# Patient Record
Sex: FEMALE | Race: WHITE | Employment: OTHER | URBAN - METROPOLITAN AREA
[De-identification: names, ages, dates, MRNs, and addresses within clinical notes are randomized per-mention and may not be internally consistent; named-entity substitution may affect disease eponyms.]

---

## 2017-04-27 ENCOUNTER — HOSPITAL ENCOUNTER (OUTPATIENT)
Dept: WOMENS IMAGING | Age: 72
Discharge: HOME | End: 2017-04-27
Payer: MEDICARE

## 2017-04-27 DIAGNOSIS — Z13.9 SCREENING: ICD-10-CM

## 2017-04-27 PROCEDURE — 77063 BREAST TOMOSYNTHESIS BI: CPT

## 2017-05-01 ENCOUNTER — HOSPITAL ENCOUNTER (OUTPATIENT)
Dept: CT IMAGING | Age: 72
Discharge: HOME | End: 2017-05-01
Payer: MEDICARE

## 2017-05-01 DIAGNOSIS — R63.4 WEIGHT LOSS: ICD-10-CM

## 2017-05-01 DIAGNOSIS — R10.9 ABDOMINAL PAIN, UNSPECIFIED SITE: ICD-10-CM

## 2017-05-04 ENCOUNTER — HOSPITAL ENCOUNTER (OUTPATIENT)
Dept: CT IMAGING | Age: 72
Discharge: HOME | End: 2017-05-04
Payer: MEDICARE

## 2017-05-04 VITALS — RESPIRATION RATE: 12 BRPM | DIASTOLIC BLOOD PRESSURE: 66 MMHG | SYSTOLIC BLOOD PRESSURE: 110 MMHG | HEART RATE: 66 BPM

## 2017-05-04 LAB
GFR AFRICAN AMERICAN: >60
GFR NON-AFRICAN AMERICAN: >60
PERFORMED ON: NORMAL
POC CREATININE: 0.7 MG/DL (ref 0.6–1.2)
POC SAMPLE TYPE: NORMAL

## 2017-05-04 PROCEDURE — 2500000003 HC RX 250 WO HCPCS: Performed by: RADIOLOGY

## 2017-05-04 PROCEDURE — 71260 CT THORAX DX C+: CPT

## 2017-05-04 PROCEDURE — 74177 CT ABD & PELVIS W/CONTRAST: CPT

## 2017-05-04 PROCEDURE — 6360000004 HC RX CONTRAST MEDICATION: Performed by: RADIOLOGY

## 2017-05-04 RX ADMIN — BARIUM SULFATE 450 ML: 21 SUSPENSION ORAL at 08:21

## 2017-05-04 RX ADMIN — IOPAMIDOL 100 ML: 755 INJECTION, SOLUTION INTRAVENOUS at 08:20

## 2021-08-19 LAB
ALBUMIN SERPL-MCNC: 4.2 G/DL (ref 3.5–4.6)
ALP BLD-CCNC: 59 U/L (ref 40–130)
ALT SERPL-CCNC: 22 U/L (ref 0–33)
ANION GAP SERPL CALCULATED.3IONS-SCNC: 9 MEQ/L (ref 9–15)
AST SERPL-CCNC: 24 U/L (ref 0–35)
BILIRUB SERPL-MCNC: 0.9 MG/DL (ref 0.2–0.7)
BUN BLDV-MCNC: 14 MG/DL (ref 8–23)
CALCIUM SERPL-MCNC: 9.4 MG/DL (ref 8.5–9.9)
CHLORIDE BLD-SCNC: 102 MEQ/L (ref 95–107)
CHOLESTEROL, TOTAL: 134 MG/DL (ref 0–199)
CO2: 28 MEQ/L (ref 20–31)
CREAT SERPL-MCNC: 0.6 MG/DL (ref 0.5–0.9)
GFR AFRICAN AMERICAN: >60
GFR NON-AFRICAN AMERICAN: >60
GLOBULIN: 2.8 G/DL (ref 2.3–3.5)
GLUCOSE BLD-MCNC: 90 MG/DL (ref 70–99)
HDLC SERPL-MCNC: 79 MG/DL (ref 40–59)
LDL CHOLESTEROL CALCULATED: 45 MG/DL (ref 0–129)
POTASSIUM SERPL-SCNC: 4.4 MEQ/L (ref 3.4–4.9)
SODIUM BLD-SCNC: 139 MEQ/L (ref 135–144)
TOTAL PROTEIN: 7 G/DL (ref 6.3–8)
TRIGL SERPL-MCNC: 51 MG/DL (ref 0–150)

## 2022-04-23 ENCOUNTER — APPOINTMENT (OUTPATIENT)
Dept: MRI IMAGING | Age: 77
DRG: 065 | End: 2022-04-23
Payer: MEDICARE

## 2022-04-23 ENCOUNTER — HOSPITAL ENCOUNTER (INPATIENT)
Age: 77
LOS: 2 days | Discharge: HOME | DRG: 065 | End: 2022-04-25
Attending: STUDENT IN AN ORGANIZED HEALTH CARE EDUCATION/TRAINING PROGRAM | Admitting: FAMILY MEDICINE
Payer: MEDICARE

## 2022-04-23 ENCOUNTER — APPOINTMENT (OUTPATIENT)
Dept: CT IMAGING | Age: 77
DRG: 065 | End: 2022-04-23
Payer: MEDICARE

## 2022-04-23 ENCOUNTER — APPOINTMENT (OUTPATIENT)
Dept: GENERAL RADIOLOGY | Age: 77
DRG: 065 | End: 2022-04-23
Payer: MEDICARE

## 2022-04-23 DIAGNOSIS — I61.5 INTRAVENTRICULAR HEMORRHAGE (HCC): Primary | ICD-10-CM

## 2022-04-23 PROBLEM — E78.49 OTHER HYPERLIPIDEMIA: Status: ACTIVE | Noted: 2022-04-23

## 2022-04-23 PROBLEM — K20.90 BARRETT'S ESOPHAGUS WITH ESOPHAGITIS: Status: ACTIVE | Noted: 2022-04-23

## 2022-04-23 PROBLEM — K22.70 BARRETT'S ESOPHAGUS WITH ESOPHAGITIS: Status: ACTIVE | Noted: 2022-04-23

## 2022-04-23 LAB
ABO/RH: NORMAL
ALBUMIN SERPL-MCNC: 4.1 G/DL (ref 3.5–4.6)
ALP BLD-CCNC: 59 U/L (ref 40–130)
ALT SERPL-CCNC: 16 U/L (ref 0–33)
ANION GAP SERPL CALCULATED.3IONS-SCNC: 13 MEQ/L (ref 9–15)
ANTIBODY SCREEN: NORMAL
APTT: 29 SEC (ref 24.4–36.8)
AST SERPL-CCNC: 18 U/L (ref 0–35)
BASOPHILS ABSOLUTE: 0 K/UL (ref 0–0.2)
BASOPHILS RELATIVE PERCENT: 0.5 %
BILIRUB SERPL-MCNC: 0.7 MG/DL (ref 0.2–0.7)
BILIRUBIN URINE: NEGATIVE
BLOOD, URINE: NEGATIVE
BUN BLDV-MCNC: 12 MG/DL (ref 8–23)
CALCIUM SERPL-MCNC: 8.6 MG/DL (ref 8.5–9.9)
CHLORIDE BLD-SCNC: 96 MEQ/L (ref 95–107)
CLARITY: CLEAR
CO2: 23 MEQ/L (ref 20–31)
COLOR: YELLOW
CREAT SERPL-MCNC: 0.49 MG/DL (ref 0.5–0.9)
EOSINOPHILS ABSOLUTE: 0 K/UL (ref 0–0.7)
EOSINOPHILS RELATIVE PERCENT: 0.5 %
GFR AFRICAN AMERICAN: >60
GFR NON-AFRICAN AMERICAN: >60
GLOBULIN: 2.7 G/DL (ref 2.3–3.5)
GLUCOSE BLD-MCNC: 102 MG/DL (ref 70–99)
GLUCOSE URINE: NEGATIVE MG/DL
HCT VFR BLD CALC: 38.1 % (ref 37–47)
HEMOGLOBIN: 13.1 G/DL (ref 12–16)
INFLUENZA A BY PCR: NEGATIVE
INFLUENZA B BY PCR: NEGATIVE
INR BLD: 1
KETONES, URINE: 40 MG/DL
LEUKOCYTE ESTERASE, URINE: NEGATIVE
LYMPHOCYTES ABSOLUTE: 1.8 K/UL (ref 1–4.8)
LYMPHOCYTES RELATIVE PERCENT: 26.3 %
MAGNESIUM: 2.1 MG/DL (ref 1.7–2.4)
MCH RBC QN AUTO: 30.1 PG (ref 27–31.3)
MCHC RBC AUTO-ENTMCNC: 34.3 % (ref 33–37)
MCV RBC AUTO: 87.7 FL (ref 82–100)
MONOCYTES ABSOLUTE: 0.5 K/UL (ref 0.2–0.8)
MONOCYTES RELATIVE PERCENT: 6.7 %
NEUTROPHILS ABSOLUTE: 4.6 K/UL (ref 1.4–6.5)
NEUTROPHILS RELATIVE PERCENT: 66 %
NITRITE, URINE: NEGATIVE
PDW BLD-RTO: 12.7 % (ref 11.5–14.5)
PH UA: 5.5 (ref 5–9)
PLATELET # BLD: 198 K/UL (ref 130–400)
POTASSIUM SERPL-SCNC: 3.9 MEQ/L (ref 3.4–4.9)
PROTEIN UA: NEGATIVE MG/DL
PROTHROMBIN TIME: 13.5 SEC (ref 12.3–14.9)
RBC # BLD: 4.34 M/UL (ref 4.2–5.4)
REASON FOR REJECTION: NORMAL
REJECTED TEST: NORMAL
SARS-COV-2, NAAT: NOT DETECTED
SODIUM BLD-SCNC: 132 MEQ/L (ref 135–144)
SPECIFIC GRAVITY UA: 1.02 (ref 1–1.03)
TOTAL CK: 77 U/L (ref 0–170)
TOTAL PROTEIN: 6.8 G/DL (ref 6.3–8)
URINE REFLEX TO CULTURE: ABNORMAL
UROBILINOGEN, URINE: 1 E.U./DL
WBC # BLD: 7 K/UL (ref 4.8–10.8)

## 2022-04-23 PROCEDURE — 6360000004 HC RX CONTRAST MEDICATION: Performed by: STUDENT IN AN ORGANIZED HEALTH CARE EDUCATION/TRAINING PROGRAM

## 2022-04-23 PROCEDURE — 87040 BLOOD CULTURE FOR BACTERIA: CPT

## 2022-04-23 PROCEDURE — 70450 CT HEAD/BRAIN W/O DYE: CPT

## 2022-04-23 PROCEDURE — 96365 THER/PROPH/DIAG IV INF INIT: CPT

## 2022-04-23 PROCEDURE — 92610 EVALUATE SWALLOWING FUNCTION: CPT

## 2022-04-23 PROCEDURE — 83735 ASSAY OF MAGNESIUM: CPT

## 2022-04-23 PROCEDURE — 1210000000 HC MED SURG R&B

## 2022-04-23 PROCEDURE — 86850 RBC ANTIBODY SCREEN: CPT

## 2022-04-23 PROCEDURE — 36415 COLL VENOUS BLD VENIPUNCTURE: CPT

## 2022-04-23 PROCEDURE — 6370000000 HC RX 637 (ALT 250 FOR IP): Performed by: STUDENT IN AN ORGANIZED HEALTH CARE EDUCATION/TRAINING PROGRAM

## 2022-04-23 PROCEDURE — 85730 THROMBOPLASTIN TIME PARTIAL: CPT

## 2022-04-23 PROCEDURE — 96375 TX/PRO/DX INJ NEW DRUG ADDON: CPT

## 2022-04-23 PROCEDURE — 2580000003 HC RX 258: Performed by: STUDENT IN AN ORGANIZED HEALTH CARE EDUCATION/TRAINING PROGRAM

## 2022-04-23 PROCEDURE — 99291 CRITICAL CARE FIRST HOUR: CPT | Performed by: PSYCHIATRY & NEUROLOGY

## 2022-04-23 PROCEDURE — 70551 MRI BRAIN STEM W/O DYE: CPT

## 2022-04-23 PROCEDURE — 96366 THER/PROPH/DIAG IV INF ADDON: CPT

## 2022-04-23 PROCEDURE — 81003 URINALYSIS AUTO W/O SCOPE: CPT

## 2022-04-23 PROCEDURE — 87635 SARS-COV-2 COVID-19 AMP PRB: CPT

## 2022-04-23 PROCEDURE — 86900 BLOOD TYPING SEROLOGIC ABO: CPT

## 2022-04-23 PROCEDURE — 99231 SBSQ HOSP IP/OBS SF/LOW 25: CPT | Performed by: NEUROLOGICAL SURGERY

## 2022-04-23 PROCEDURE — 71045 X-RAY EXAM CHEST 1 VIEW: CPT

## 2022-04-23 PROCEDURE — 87502 INFLUENZA DNA AMP PROBE: CPT

## 2022-04-23 PROCEDURE — 82550 ASSAY OF CK (CPK): CPT

## 2022-04-23 PROCEDURE — 6360000002 HC RX W HCPCS: Performed by: STUDENT IN AN ORGANIZED HEALTH CARE EDUCATION/TRAINING PROGRAM

## 2022-04-23 PROCEDURE — 93005 ELECTROCARDIOGRAM TRACING: CPT | Performed by: STUDENT IN AN ORGANIZED HEALTH CARE EDUCATION/TRAINING PROGRAM

## 2022-04-23 PROCEDURE — 70496 CT ANGIOGRAPHY HEAD: CPT

## 2022-04-23 PROCEDURE — 85025 COMPLETE CBC W/AUTO DIFF WBC: CPT

## 2022-04-23 PROCEDURE — 85610 PROTHROMBIN TIME: CPT

## 2022-04-23 PROCEDURE — 70498 CT ANGIOGRAPHY NECK: CPT

## 2022-04-23 PROCEDURE — 86901 BLOOD TYPING SEROLOGIC RH(D): CPT

## 2022-04-23 PROCEDURE — 80053 COMPREHEN METABOLIC PANEL: CPT

## 2022-04-23 PROCEDURE — 6370000000 HC RX 637 (ALT 250 FOR IP): Performed by: FAMILY MEDICINE

## 2022-04-23 PROCEDURE — 99285 EMERGENCY DEPT VISIT HI MDM: CPT

## 2022-04-23 RX ORDER — MAGNESIUM SULFATE IN WATER 40 MG/ML
2000 INJECTION, SOLUTION INTRAVENOUS ONCE
Status: COMPLETED | OUTPATIENT
Start: 2022-04-23 | End: 2022-04-23

## 2022-04-23 RX ORDER — LEVOTHYROXINE SODIUM 0.03 MG/1
25 TABLET ORAL DAILY
COMMUNITY

## 2022-04-23 RX ORDER — ATORVASTATIN CALCIUM 40 MG/1
40 TABLET, FILM COATED ORAL NIGHTLY
COMMUNITY
End: 2022-05-11

## 2022-04-23 RX ORDER — LORAZEPAM 2 MG/ML
1 INJECTION INTRAMUSCULAR
Status: ACTIVE | OUTPATIENT
Start: 2022-04-23 | End: 2022-04-23

## 2022-04-23 RX ORDER — 0.9 % SODIUM CHLORIDE 0.9 %
500 INTRAVENOUS SOLUTION INTRAVENOUS ONCE
Status: COMPLETED | OUTPATIENT
Start: 2022-04-23 | End: 2022-04-23

## 2022-04-23 RX ORDER — METOPROLOL SUCCINATE 25 MG/1
25 TABLET, EXTENDED RELEASE ORAL DAILY
COMMUNITY

## 2022-04-23 RX ORDER — DIPHENHYDRAMINE HYDROCHLORIDE 50 MG/ML
25 INJECTION INTRAMUSCULAR; INTRAVENOUS ONCE
Status: COMPLETED | OUTPATIENT
Start: 2022-04-23 | End: 2022-04-23

## 2022-04-23 RX ORDER — ONDANSETRON 4 MG/1
4 TABLET, ORALLY DISINTEGRATING ORAL EVERY 8 HOURS PRN
Status: DISCONTINUED | OUTPATIENT
Start: 2022-04-23 | End: 2022-04-25 | Stop reason: HOSPADM

## 2022-04-23 RX ORDER — ONDANSETRON 2 MG/ML
4 INJECTION INTRAMUSCULAR; INTRAVENOUS EVERY 6 HOURS PRN
Status: DISCONTINUED | OUTPATIENT
Start: 2022-04-23 | End: 2022-04-25 | Stop reason: HOSPADM

## 2022-04-23 RX ORDER — PROCHLORPERAZINE EDISYLATE 5 MG/ML
10 INJECTION INTRAMUSCULAR; INTRAVENOUS ONCE
Status: COMPLETED | OUTPATIENT
Start: 2022-04-23 | End: 2022-04-23

## 2022-04-23 RX ORDER — 0.9 % SODIUM CHLORIDE 0.9 %
1000 INTRAVENOUS SOLUTION INTRAVENOUS ONCE
Status: COMPLETED | OUTPATIENT
Start: 2022-04-23 | End: 2022-04-23

## 2022-04-23 RX ORDER — LABETALOL HYDROCHLORIDE 5 MG/ML
10 INJECTION, SOLUTION INTRAVENOUS EVERY 10 MIN PRN
Status: DISCONTINUED | OUTPATIENT
Start: 2022-04-23 | End: 2022-04-25 | Stop reason: HOSPADM

## 2022-04-23 RX ORDER — ACETAMINOPHEN 500 MG
1000 TABLET ORAL ONCE
Status: COMPLETED | OUTPATIENT
Start: 2022-04-23 | End: 2022-04-23

## 2022-04-23 RX ORDER — ACETAMINOPHEN 325 MG/1
650 TABLET ORAL EVERY 4 HOURS PRN
Status: DISCONTINUED | OUTPATIENT
Start: 2022-04-23 | End: 2022-04-25 | Stop reason: HOSPADM

## 2022-04-23 RX ORDER — LORAZEPAM 2 MG/ML
1 INJECTION INTRAMUSCULAR EVERY 4 HOURS PRN
Status: DISCONTINUED | OUTPATIENT
Start: 2022-04-23 | End: 2022-04-25 | Stop reason: HOSPADM

## 2022-04-23 RX ORDER — KETOROLAC TROMETHAMINE 15 MG/ML
15 INJECTION, SOLUTION INTRAMUSCULAR; INTRAVENOUS ONCE
Status: COMPLETED | OUTPATIENT
Start: 2022-04-23 | End: 2022-04-23

## 2022-04-23 RX ADMIN — MAGNESIUM SULFATE HEPTAHYDRATE 2000 MG: 40 INJECTION, SOLUTION INTRAVENOUS at 02:51

## 2022-04-23 RX ADMIN — IOPAMIDOL 100 ML: 612 INJECTION, SOLUTION INTRAVENOUS at 06:38

## 2022-04-23 RX ADMIN — ACETAMINOPHEN 650 MG: 325 TABLET ORAL at 18:25

## 2022-04-23 RX ADMIN — KETOROLAC TROMETHAMINE 15 MG: 15 INJECTION, SOLUTION INTRAMUSCULAR; INTRAVENOUS at 02:50

## 2022-04-23 RX ADMIN — SODIUM CHLORIDE 500 ML: 9 INJECTION, SOLUTION INTRAVENOUS at 02:53

## 2022-04-23 RX ADMIN — DIPHENHYDRAMINE HYDROCHLORIDE 25 MG: 50 INJECTION, SOLUTION INTRAMUSCULAR; INTRAVENOUS at 02:47

## 2022-04-23 RX ADMIN — PROCHLORPERAZINE EDISYLATE 10 MG: 5 INJECTION INTRAMUSCULAR; INTRAVENOUS at 02:54

## 2022-04-23 RX ADMIN — SODIUM CHLORIDE 1000 ML: 9 INJECTION, SOLUTION INTRAVENOUS at 02:52

## 2022-04-23 RX ADMIN — ACETAMINOPHEN 1000 MG: 500 TABLET ORAL at 02:54

## 2022-04-23 ASSESSMENT — ENCOUNTER SYMPTOMS
SHORTNESS OF BREATH: 0
CHOKING: 0
BACK PAIN: 0
NAUSEA: 0
TROUBLE SWALLOWING: 0
PHOTOPHOBIA: 0
COLOR CHANGE: 0
VOMITING: 0

## 2022-04-23 ASSESSMENT — PAIN SCALES - GENERAL
PAINLEVEL_OUTOF10: 0
PAINLEVEL_OUTOF10: 10
PAINLEVEL_OUTOF10: 0

## 2022-04-23 ASSESSMENT — PAIN DESCRIPTION - FREQUENCY: FREQUENCY: CONTINUOUS

## 2022-04-23 ASSESSMENT — PAIN DESCRIPTION - LOCATION: LOCATION: GENERALIZED

## 2022-04-23 ASSESSMENT — PAIN - FUNCTIONAL ASSESSMENT
PAIN_FUNCTIONAL_ASSESSMENT: NONE - DENIES PAIN
PAIN_FUNCTIONAL_ASSESSMENT: 0-10
PAIN_FUNCTIONAL_ASSESSMENT: ACTIVITIES ARE NOT PREVENTED
PAIN_FUNCTIONAL_ASSESSMENT: NONE - DENIES PAIN

## 2022-04-23 ASSESSMENT — PAIN DESCRIPTION - PAIN TYPE: TYPE: ACUTE PAIN

## 2022-04-23 ASSESSMENT — PAIN DESCRIPTION - DESCRIPTORS
DESCRIPTORS: THROBBING
DESCRIPTORS: ACHING

## 2022-04-23 NOTE — PROGRESS NOTES
[facility] EMERGENCY [facility] AT [place]   Facility/Department: [address] [facility] ED  Speech Language Pathology  Clinical Bedside Swallow Evaluation    Solange oS  : 1945 (22 y.o.)   MRN: [id number]  ROOM:   ADMISSION DATE: 2022  PATIENT DIAGNOSIS(ES): Intraventricular hemorrhage [facility]) [I61.5]  Chief Complaint   Patient presents with    Illness     Patient Active Problem List    Diagnosis Date Noted    Intraventricular hemorrhage, nontraumatic ([facility] [address] 2022    Intraventricular hemorrhage ([facility] [address] 2022    Other hyperlipidemia 2022    Beasley's esophagus with esophagitis 2022     History reviewed. No pertinent past medical history. History reviewed. No pertinent surgical history. Allergies   Allergen Reactions    Hydromorphone      Other reaction(s): Mental Status Change  Hard time understanding people, and vomiting    Penicillins      Other reaction(s): Other: See Comments  convulsion       DATE ONSET: 2022    Date of Evaluation: 2022   Evaluating Therapist: MAHESH Novak    Dysphagia Diagnosis  Dysphagia Diagnosis: Swallow function appears WFL;No clinical indicators of dysphagia  Dysphagia Impression : Patient presents with oral phase WFL and no overt s/s of aspiration. Patient completed PO trials including sips of water from cup, consecutive sips from cup, and straw sips. No overt s/s of aspiration observed. Patient completed PO trials of puree and regular solid. Patient with timely mastication and bolus formation, no overt s/s of aspiration noted. Recommend regular/thin liquid diet. Recommended Diet     Diet Solids Recommendation: Regular  Liquid Consistency Recommendation:  Thin  Recommended Form of Meds: PO  Compensatory Swallowing Strategies : Upright as possible for all oral intake      Reason for Referral  Krupa Dominguez was referred for a bedside swallow evaluation to assess the efficiency of her swallow function, identify signs and symptoms of aspiration, identify risk factors, and make recommendations regarding safe dietary consistencies, effective compensatory strategies, and safe eating environment. General  Chart Reviewed: Yes  Subjective  Subjective: Patient admitted to ED 4/23/22 with c/o headache, fever. CT revealed very small left ventricular hemorrhage. Behavior/Cognition: Alert; Cooperative;Pleasant mood  Respiratory Status: Room air  O2 Device: None (Room air)  Communication Observation: Functional  Dentition: Adequate  Patient Positioning: Upright in bed  Baseline Vocal Quality: Normal  Prior Dysphagia History: Per patient and son, no history of dysphagia. Patient avoids spicy foods due to history of Beasley's esophagus. Consistencies Administered: Regular;Pureed; Thin - straw; Thin - cup    Vision and Hearing  Vision  Vision: Within Functional Limits  Hearing  Hearing: Within functional limits    Current Diet level  Current Diet : NPO  Current Liquid Diet : NPO    Oral Motor  Labial: No impairment  Dentition: Full  Lingual: No impairment  Velum: No Impairment    Oral/Pharyngeal Phase  Oral Phase - Comment: Oral phase WNL. Pharyngeal Phase: No overt s/s of aspiration observed for consistencies presented. PO Trials  Patient Position: Upright in bed  Vocal Quality: No Impairment  Consistency Presented:  Thin  How Presented: Self-fed/presented;Straw;Cup/gulp;Cup/sip  How much presented:  (3 sips + 3 oz + straw sips)  Bolus Acceptance: No impairment  Bolus Formation/Control: No impairment  Propulsion: No impairment  Oral Residue: None  Initiation of Swallow: No impairment  Aspiration Signs/Symptoms: None  Pharyngeal Phase Characteristics: No impairment, issues, or problems  Additional PO Trials: 2    PO Trials 2  Consistency Presented: Pureed  How Presented: Self-fed/presented  How much presented:  (1 tsp)  Bolus Acceptance: No impairment  Bolus Formation/Control: No impairment  Propulsion: No impairment  Oral Residue: None  Initiation of Swallow: No impairment  Aspiration Signs/Symptoms: None    PO Trials 3  Consistency Presented: Regular  How Presented: Self-fed/presented  How much presented:  (1 bite emile cracker)  Bolus Acceptance: No impairment  Bolus Formation/Control: No impairment  Propulsion: No impairment  Oral Residue: None  Initiation of Swallow: No impairment  Aspiration Signs/Symptoms: None    Dysphagia Diagnosis  Dysphagia Diagnosis: Swallow function appears WFL;No clinical indicators of dysphagia  Dysphagia Impression : Patient presents with oral phase WFL and no overt s/s of aspiration. Patient completed PO trials including sips of water from cup, consecutive sips from cup, and straw sips. No overt s/s of aspiration observed. Patient completed PO trials of puree and regular solid. Patient with timely mastication and bolus formation, no overt s/s of aspiration noted. Recommend regular/thin liquid diet. Dysphagia Outcome Severity Scale: Level 7: Normal in all situations     Recommendations  Requires SLP Intervention: Yes  D/C Recommendations: No follow up therapy recommended post discharge  Diet Solids Recommendation: Regular  Liquid Consistency Recommendation: Thin  Compensatory Swallowing Strategies : Upright as possible for all oral intake  Recommended Form of Meds: PO  Duration of Treatment: 1x/week or until goals are met    Prognosis  Prognosis  Prognosis for safe diet advancement: excellent    Education  Individuals consulted  Consulted and agree with results and recommendations: RN  RN Name: [id number]    Treatment/Goals  Short-term Goals  Timeframe for Short-term Goals: 1 week  Goal 1: Patient will complete meal monitoring with SLP to assess diet tolerance. Long-term Goals  Timeframe for Long-term Goals: 1 week  Goal 1: Patient will tolerate least restrictive diet with no adverse outcomes.     Safety Devices  Safety Devices  Safety Devices in place: Yes  Type of devices: Call light within reach    Pain Assessment  Pain Assessment: Patient does not appear in pain    Pain Re-assessment  Pain Reassessment: Patient does not appear in pain      Therapy Time   Time in: 1100  Time out: 1110  Total time: 10 mins              Signature: Electronically signed by MAHESH Dallas on 4/23/2022 at 11:37 AM

## 2022-04-23 NOTE — ED PROVIDER NOTES
[address] [facility]  eMERGENCY dEPARTMENT eNCOUnter      Pt Name: Erma Coyne  MRN: [id number]  [place] 1945  Date of evaluation: 4/23/2022  Provider: Jose Maria Chavez MD      HISTORY OF PRESENT ILLNESS      Chief Complaint   Patient presents with    Illness       The history is provided by the Patient. Erma Coyne is a 78 y.o. female with a PMH clinically significant for Cataracts presenting to the ED via PV c/o generalized fatigue/myalgias, headache, fevers, chills and diarrhea with initial onset 5 days ago. Patient states headache was gradual onset, aching and throbbing. States headache is diffuse. No radiation into the neck. No associated photophobia, vision changes, numbness, weakness, tingling, nausea or vomiting. Also denies any associated neck stiffness/pain. States intermittent fevers with maximum temperature of 100.8 °F.  Has been taking OTC meds with some relief. Denies any associated sore throat, congestion, cough, abdominal pain, chest pain, shortness of breath. States diarrhea has been 2-3 episodes per day. No associated blood. States headache is also been intermittent. Was more severe today associated with lightheadedness. Patient otherwise stating that she has been feeling well. No known sick contacts. States no history of similar previous episodes. States no hx of regular HA's. Per Chart Review: No recent imaging of the head. REVIEW OF SYSTEMS       Review of Systems   Constitutional: Positive for activity change, appetite change, chills, fatigue and fever. Negative for diaphoresis. HENT: Negative for rhinorrhea and sore throat. Eyes: Negative for photophobia, pain and visual disturbance. Respiratory: Negative for cough and shortness of breath. Cardiovascular: Negative for chest pain and palpitations. Gastrointestinal: Positive for diarrhea. Negative for abdominal pain, nausea and vomiting.    Genitourinary: Negative for difficulty urinating and dysuria. Musculoskeletal: Positive for myalgias. Negative for arthralgias, back pain, neck pain and neck stiffness. Skin: Negative for rash. Neurological: Positive for light-headedness and headaches. Negative for dizziness, speech difficulty, weakness and numbness. PAST MEDICAL HISTORY   History reviewed. No pertinent past medical history. SURGICAL HISTORY     History reviewed. No pertinent surgical history. FAMILY HISTORY     History reviewed. No pertinent family history. SOCIAL HISTORY       Social History     Socioeconomic History    Marital status: Single     Spouse name: None    Number of children: None    Years of education: None    Highest education level: None   Occupational History    None   Tobacco Use    Smoking status: Never Smoker    Smokeless tobacco: None   Substance and Sexual Activity    Alcohol use: None    Drug use: None    Sexual activity: None   Other Topics Concern    None   Social History Narrative    None     Social Determinants of Health     Financial Resource Strain:     Difficulty of Paying Living Expenses: Not on file   Food Insecurity:     Worried About Running Out of Food in the Last Year: Not on file    Jayleen of Food in the Last Year: Not on file   Transportation Needs:     Lack of Transportation (Medical): Not on file    Lack of Transportation (Non-Medical):  Not on file   Physical Activity:     Days of Exercise per Week: Not on file    Minutes of Exercise per Session: Not on file   Stress:     Feeling of Stress : Not on file   Social Connections:     Frequency of Communication with Friends and Family: Not on file    Frequency of Social Gatherings with Friends and Family: Not on file    Attends Hinduism Services: Not on file    Active Member of Clubs or Organizations: Not on file    Attends Club or Organization Meetings: Not on file    Marital Status: Not on file   Intimate Partner Violence:     Fear of Current or Ex-Partner: Not on file    Emotionally Abused: Not on file    Physically Abused: Not on file    Sexually Abused: Not on file   Housing Stability:     Unable to Pay for Housing in the Last Year: Not on file    Number of Sixto in the Last Year: Not on file    Unstable Housing in the Last Year: Not on file       CURRENT MEDICATIONS       Discharge Medication List as of 4/25/2022  1:31 PM      CONTINUE these medications which have NOT CHANGED    Details   atorvastatin (LIPITOR) 40 MG tablet Take 40 mg by mouth nightlyHistorical Med      levothyroxine (SYNTHROID) 25 MCG tablet Take 25 mcg by mouth DailyHistorical Med      metoprolol succinate (TOPROL XL) 25 MG extended release tablet Take 25 mg by mouth dailyHistorical Med             ALLERGIES     Hydromorphone and Penicillins      PHYSICAL EXAM       ED Triage Vitals [04/23/22 0116]   BP Temp Temp Source Pulse Resp SpO2 Height Weight   (!) 160/78 98.7 °F (37.1 °C) Oral 79 18 98 % 5' 2\" (1.575 m) 120 lb (54.4 kg)       Physical Exam  Vitals and nursing note reviewed. Constitutional:       General: She is not in acute distress. Appearance: She is not ill-appearing, toxic-appearing or diaphoretic. Comments: Fatigued-appearing   HENT:      Head: Normocephalic and atraumatic. Mouth/Throat:      Mouth: Mucous membranes are dry. Pharynx: Oropharynx is clear. Eyes:      Extraocular Movements: Extraocular movements intact. Conjunctiva/sclera: Conjunctivae normal.      Pupils: Pupils are equal, round, and reactive to light. Neck:      Meningeal: Brudzinski's sign and Kernig's sign absent. Cardiovascular:      Rate and Rhythm: Normal rate and regular rhythm. Pulses: Normal pulses. Heart sounds: Normal heart sounds. Pulmonary:      Effort: Pulmonary effort is normal. No respiratory distress. Breath sounds: Normal breath sounds. Chest:      Chest wall: No tenderness. Abdominal:      General: There is no distension.       Palpations: Abdomen is soft. Tenderness: There is no abdominal tenderness. There is no guarding or rebound. Musculoskeletal:         General: No tenderness. Cervical back: Normal range of motion and neck supple. No rigidity or tenderness. Right lower leg: No edema. Left lower leg: No edema. Lymphadenopathy:      Cervical: No cervical adenopathy. Skin:     General: Skin is dry. Capillary Refill: Capillary refill takes less than 2 seconds. Neurological:      General: No focal deficit present. Mental Status: She is alert and oriented to person, place, and time. Cranial Nerves: No cranial nerve deficit. Sensory: No sensory deficit. Motor: No weakness.       Coordination: Coordination normal.   Psychiatric:         Mood and Affect: Mood normal.         Behavior: Behavior normal.         MDM:   Chart Reviewed: PMH and additional information as noted in HPI obtained from chart review    Vitals:    Vitals:    04/24/22 1243 04/24/22 2000 04/25/22 0656 04/25/22 0906   BP: (!) 129/50 (!) 153/55 (!) 154/70 (!) 126/57   Pulse: 76 73 80 81   Resp:  18  18   Temp:  98.4 °F (36.9 °C) 97.9 °F (36.6 °C) 97.9 °F (36.6 °C)   TempSrc:  Oral  Oral   SpO2: 100% 99% 98% 100%   Weight:       Height:           PROCEDURES:  Unless otherwise noted below, none  Procedures    LABS:  Labs Reviewed   COMPREHENSIVE METABOLIC PANEL - Abnormal; Notable for the following components:       Result Value    Sodium 132 (*)     Glucose 102 (*)     CREATININE 0.49 (*)     All other components within normal limits   URINALYSIS WITH REFLEX TO CULTURE - Abnormal; Notable for the following components:    Ketones, Urine 40 (*)     All other components within normal limits   BASIC METABOLIC PANEL - Abnormal; Notable for the following components:    Sodium 134 (*)     CREATININE 0.46 (*)     Calcium 8.0 (*)     All other components within normal limits   CBC - Abnormal; Notable for the following components:    RBC 3.88 (*) Hemoglobin 11.6 (*)     Hematocrit 34.3 (*)     All other components within normal limits   RAPID INFLUENZA A/B ANTIGENS   COVID-19, RAPID   CULTURE, BLOOD 1    Narrative:     ORDER#: [id number]                          ORDERED BY: Eloisa Torres  SOURCE: Blood                              COLLECTED:  04/23/22 05:08  [facility] AT [facility].:                      RECEIVED :  04/23/22 05:08   CULTURE, BLOOD 2    Narrative:     ORDER#: [id number]                          ORDERED BY: Eloisa Torres  SOURCE: Blood                              COLLECTED:  04/23/22 05:08  [facility].:                      RECEIVED :  04/23/22 05:08   MAGNESIUM   CBC WITH AUTO DIFFERENTIAL   CK   PROTIME-INR   APTT   SPECIMEN REJECTION    Narrative:     ORDER WAS CANCELLED 04/23/2022 05:42, no intials, date and time. HEMOGLOBIN A1C   TYPE AND SCREEN       MRI BRAIN WO CONTRAST   Final Result      Small amount of hemorrhage within the occipital horns of the bilateral lateral ventricles. No change in morphology of the ventricles. Generalized parenchymal volume loss and nonspecific white matter findings most compatible with chronic small vessel ischemic changes in a patient of this age. CT head without contrast   Final Result      Interval development of a small amount of hemorrhage within the occipital horn the right lateral ventricle. No significant interval change in the amount of hemorrhage within the occipital horn of the left lateral ventricle. Unchanged morphology of the ventricles. All CT scans at this facility use dose modulation, iterative reconstruction, and/or weight based dosing when appropriate to reduce radiation dose to as low as reasonably achievable. CTA NECK W WO CONTRAST   Final Result      CTA head:      No aneurysm or high-grade stenosis. CTA neck:      No aneurysm, dissection, or high-grade stenosis. CTV head:      No dural venous sinus thrombosis.          All CT scans at this facility use dose modulation, iterative reconstruction, and/or weight based dosing when appropriate to reduce radiation dose to as low as reasonably achievable. CTA HEAD W WO CONTRAST   Final Result      CTA head:      No aneurysm or high-grade stenosis. CTA neck:      No aneurysm, dissection, or high-grade stenosis. CTV head:      No dural venous sinus thrombosis. All CT scans at this facility use dose modulation, iterative reconstruction, and/or weight based dosing when appropriate to reduce radiation dose to as low as reasonably achievable. CTA VENOUS HEAD W WO CONTRAST   Final Result      CTA head:      No aneurysm or high-grade stenosis. CTA neck:      No aneurysm, dissection, or high-grade stenosis. CTV head:      No dural venous sinus thrombosis. All CT scans at this facility use dose modulation, iterative reconstruction, and/or weight based dosing when appropriate to reduce radiation dose to as low as reasonably achievable. CT HEAD WO CONTRAST   Final Result      Small amount of intraventricular hemorrhage within the occipital horn of the left lateral ventricle. Mild prominence of the ventricles is nonspecific and may be due to generalized parenchymal volume loss however developing hydrocephalus cannot be    excluded. All CT scans at this facility use dose modulation, iterative reconstruction, and/or weight based dosing when appropriate to reduce radiation dose to as low as reasonably achievable. XR CHEST PORTABLE   Final Result      No radiographic evidence of acute intrathoracic process.             CT HEAD WO CONTRAST    (Results Pending)       ED Course as of 04/28/22 1625   Sat Apr 23, 2022 0312 SARS-CoV-2, NAAT: Not Detected [NA]   0312 CBC with Auto Differential:    WBC 7.0   RBC 4.34   Hemoglobin Quant 13.1   Hematocrit 38.1   MCV 87.7   MCH 30.1   MCHC 34.3   RDW 12.7   Platelet Count 805   Neutrophils % 66.0 Lymphocyte % 26.3   Monocytes % 6.7   Eosinophils % 0.5   Basophils % 0.5   Neutrophils Absolute 4.6   Lymphocytes Absolute 1.8   Monocytes Absolute 0.5   Eosinophils Absolute 0.0   Basophils Absolute 0.0  Unremarkable. [NA]   [id number] Urinalysis with Reflex to Culture(!):    Color, UA Yellow   Clarity, UA Clear   Glucose, UA Negative   Bilirubin, Urine Negative   Ketones, Urine 40(!)   Specific Gravity, UA 1.016   Blood, Urine Negative   pH, UA 5.5   Protein, UA Negative   Urobilinogen, Urine 1.0   Nitrite, Urine Negative   Leukocyte Esterase, Urine Negative   Urine Reflex to Culture Not Indicated  No evidence of UTI. [NA]   0359 Influenza A by PCR: Negative [NA]   0359 Influenza B by PCR: Negative [NA]   0359 Magnesium: 2.1 [NA]   0359 Total CK: 77 [NA]   0359 Sodium(!): 132  Mild hyponatremia, otherwise unremarkable. [NA]   0359 EKG 12 Lead  EKG showing normal sinus rhythm, rate of 76 bpm.  Normal axis, normal intervals. No gross acute ST-T wave abnormalities. [NA]   [id number] CT HEAD WO CONTRAST  Discussed with stat rad: Small amount of abnormal blood layering posteriorly in the left lateral ventricle. No hemorrhage is seen within the right lateral ventricle, third or fourth ventricles. Both ventricles are mildly prominent. There is some microvascular change within the periventricular white matter. No subdural or epidural hemorrhage is evident. No subarachnoid hemorrhage within the sulci or fissures. No intraparenchymal hemorrhage is seen. There is no edema or abnormal mass-effect. [NA]      ED Course User Index  [NA] Brittany Yen MD       78 y.o. female with a PMH clinically significant for Cataracts presenting to the ED via PV c/o generalized fatigue/myalgias, headache, fevers, chills and diarrhea with initial onset 5 days ago. Upon initial evaluation, Pt mildly hypertensive, but otherwise Afebrile, HDS and in NAD. PE as noted above. Labs, , EKG, and Imaging as noted above.   Given findings, clinical presentation most likely consistent w/ IVH secondary to unclear etiology. Thought more likely consistent with the flu secondary to the patient's diarrhea and other systemic complaints. Flu however negative. Was initially treated with migraine cocktail with significant improvement of symptoms. Did become more fatigued in the ED and was sleeping peacefully. Still able to be aroused with minimal stimulation. Upon CT head results, both neurology and neurosurgery were consulted. Dr. Wendy Licea recommending CTA and CTV to further evaluate atypical pattern of bleeding. Orders placed in the ED. Did consider possible lumbar puncture to rule out encephalitis/meningitis although atypical presentation. Dr. Wendy Licea not recommending it at this time. Will complete this on the floor. Dr. Priscila Romo, neurosurgery, on board and will service consulting service. No recommendations at this time. No anticoagulation to reverse. SBP goal of less than 140 given spontaneous hemorrhage. Admitted to internal medicine without further acute events under my care. Pt was administered   Medications   LORazepam (ATIVAN) injection 1 mg (has no administration in time range)   0.9 % sodium chloride bolus (0 mLs IntraVENous Stopped 4/23/22 0409)   acetaminophen (TYLENOL) tablet 1,000 mg (1,000 mg Oral Given 4/23/22 0254)   ketorolac (TORADOL) injection 15 mg (15 mg IntraVENous Given 4/23/22 0250)   prochlorperazine (COMPAZINE) injection 10 mg (10 mg IntraVENous Given 4/23/22 0254)   diphenhydrAMINE (BENADRYL) injection 25 mg (25 mg IntraVENous Given 4/23/22 0247)   magnesium sulfate 2000 mg in 50 mL IVPB premix (0 mg IntraVENous Stopped 4/23/22 0443)   0.9 % sodium chloride bolus (0 mLs IntraVENous Stopped 4/23/22 0408)   iopamidol (ISOVUE-300) 61 % injection 100 mL (100 mLs IntraVENous Given 4/23/22 3248)       Plan: Admit to IM for further evaluation and management. Report given to Dr. Isabella Cool.  and Patient understanding and amenable to the POC. CRITICAL CARE TIME   Total CriticalCare time was 30 minutes, excluding separately reportable procedures. There was a high probability of clinically significant/life threatening deterioration in the patient's condition which required my urgent intervention. FINAL IMPRESSION      1.  Intraventricular hemorrhage [facility])          DISPOSITION/PLAN   DISPOSITION Admitted 04/23/2022 11:12:47 AM      Discharge Medication List as of 4/25/2022  1:31 PM           MD Laury Kaur MD  04/28/22 3581

## 2022-04-23 NOTE — ACP (ADVANCE CARE PLANNING)
Advance Care Planning     Advance Care Planning Activator (Inpatient)  Conversation Note      Date of ACP Conversation: 4/23/2022     Conversation Conducted with: Patient with Decision Making Capacity    ACP Activator: Pearl Donahue RN    Pt states that she has a living will and it is current with her wishes and decision maker. Health Care Decision Maker:     Current Designated Health Care Decision Maker:     Primary Decision Maker: Jonah Benedict - Child - [phone]      Care Preferences    Ventilation: \"If you were in your present state of health and suddenly became very ill and were unable to breathe on your own, what would your preference be about the use of a ventilator (breathing machine) if it were available to you? \"      Would the patient desire the use of ventilator (breathing machine)?: yes    \"If your health worsens and it becomes clear that your chance of recovery is unlikely, what would your preference be about the use of a ventilator (breathing machine) if it were available to you? \"     Would the patient desire the use of ventilator (breathing machine)?: No      Resuscitation  \"CPR works best to restart the heart when there is a sudden event, like a heart attack, in someone who is otherwise healthy. Unfortunately, CPR does not typically restart the heart for people who have serious health conditions or who are very sick. \"    \"In the event your heart stopped as a result of an underlying serious health condition, would you want attempts to be made to restart your heart (answer \"yes\" for attempt to resuscitate) or would you prefer a natural death (answer \"no\" for do not attempt to resuscitate)? \" yes       [x] Yes   [] No   Educated Patient / Magan Santiago regarding differences between Advance Directives and portable DNR orders.     Length of ACP Conversation in minutes:  10    Conversation Outcomes:  [x] ACP discussion completed  [] Existing advance directive reviewed with patient; no changes to patient's previously recorded wishes  [] New Advance Directive completed  [] Portable Do Not Rescitate prepared for Provider review and signature  [] POLST/POST/MOLST/MOST prepared for Provider review and signature      Follow-up plan:    [] Schedule follow-up conversation to continue planning  [] Referred individual to Provider for additional questions/concerns   [] Advised patient/agent/surrogate to review completed ACP document and update if needed with changes in condition, patient preferences or care setting    [x] This note routed to one or more involved healthcare providers

## 2022-04-23 NOTE — ED NOTES
St reports swallow eval pass, reg diet is fine but patient does not like spicy foods due to barretts history      Gaviota Ko, RN  04/23/22 2319

## 2022-04-23 NOTE — H&P
Hospital Medicine  History and Physical    Patient:  Alma Jensen  MRN: [id number]    CHIEF COMPLAINT:    Chief Complaint   Patient presents with    Illness       History Obtained From:  patient  Primary Care Physician: Dania Arboleda DO    HISTORY OF PRESENT ILLNESS:   The patient is a 78 y.o. female who presents with no significant prior medical hx with a 1 week hx of diffuse headache and an elevated temperature with associated chills. She denies chest pain, sob, n/v/d. Past Medical History:  History reviewed. No pertinent past medical history. Past Surgical History:  History reviewed. No pertinent surgical history. Medications Prior to Admission:    Prior to Admission medications    Not on File       Allergies:  Hydromorphone and Penicillins    Social History:   TOBACCO:   reports that she has never smoked. She does not have any smokeless tobacco history on file. ETOH:   has no history on file for alcohol use. Family History:   History reviewed. No pertinent family history. REVIEW OF SYSTEMS:  Ten systems reviewed and negative except for as above. Physical Exam:    Vitals: /60   Pulse 66   Temp 98.7 °F (37.1 °C) (Oral)   Resp 16   Ht 5' 2\" (1.575 m)   Wt 120 lb (54.4 kg)   SpO2 97%   BMI 21.95 kg/m²   Constitutional: alert, appears stated age and cooperative  Skin: Skin color, texture, turgor normal. No rashes or lesions  Eyes:Eye: Normal external eye, conjunctiva, DOLLY. ENT: Head: Normocephalic, no lesions, without obvious abnormality.   Neck: no adenopathy, no carotid bruit, no JVD, supple, symmetrical, trachea midline and thyroid not enlarged, symmetric, no tenderness/mass/nodules  Respiratory: clear to auscultation bilaterally  Cardiovascular: regular rate and rhythm, S1, S2 normal, no murmur, click, rub or gallop  Gastrointestinal: soft, non-tender; bowel sounds normal; no masses,  no organomegaly  Genitourinary: Deferred  Musculoskeletal:extremities normal, atraumatic, no cyanosis or edema  Neurologic: Mental status AAOx3 No facial asymmetry or droop. Normal muscle strength b/l. CN II-XII grossly intact. Psychiatric: Appropriate mood and affect. Good insight and judgement  Hematologic: No obvious bruising or bleeding    Recent Labs     04/23/22 0215   WBC 7.0   HGB 13.1        Recent Labs     04/23/22 0215   *   K 3.9   CL 96   CO2 23   BUN 12   CREATININE 0.49*   GLUCOSE 102*   AST 18   ALT 16   BILITOT 0.7   ALKPHOS 59     INR:   Recent Labs     04/23/22  0445   INR 1.0     URINALYSIS:  Recent Labs     04/23/22 0215   COLORU Yellow   PHUR 5.5   CLARITYU Clear   SPECGRAV 1.016   LEUKOCYTESUR Negative   UROBILINOGEN 1.0   BILIRUBINUR Negative   BLOODU Negative   GLUCOSEU Negative     -----------------------------------------------------------------   CTA HEAD W WO CONTRAST    Result Date: 4/23/2022  Exam: CT angiography of the head CT angiography of the neck CT venography of the head. History: Intraventricular hemorrhage Technique: Multiple contiguous axial images were obtained from the thoracic inlet through the [place] after administration of intravenous contrast. Multiplanar reformatted images and multiplanar maximum intensity projection images were obtained,  as were postprocessed 3-D volume rendered images. Luminal narrowings are estimated using NASCET criteria. CT venography performed with contrast. Comparison: None available Findings: CTA neck: There is a three-vessel  aortic arch with normal origins of the brachiocephalic, left common carotid and left subclavian arteries. Atherosclerotic calcification of the aortic arch. The vertebral arteries originate from the subclavian arteries. No significant stenosis of the great vessel origins or the origin of the carotid or vertebral arteries. Atherosclerotic calcification of the carotid bulbs resulting in less than 50% stenosis.  The bilateral common carotid arteries are of normal course and caliber. The bilateral internal and external carotid arteries are of normal course and caliber. Cervical vertebral arteries are patent. The left vertebral artery is dominant. No dissection, high grade stenosis or aneurysm. The right jugular vein courses through the posterior soft tissues of the neck, an anatomic variant. Degenerative changes of the cervical spine. CTA head: The internal carotid arteries through the skull base are patent. The bilateral middle cerebral arteries through the trifurcation and opercular branches are patent. The vertebrobasilar system including the superior cerebellar and posterior cerebral arteries are patent. The left posterior indicating artery is patent. The right posterior communicating artery is not definitively visualized. The bilateral anterior cerebral arteries and anterior communicating artery are patent. No aneurysm or high-grade stenosis of the cerebral vasculature. CT venography: No filling defect within the dural venous sinuses. CTA head: No aneurysm or high-grade stenosis. CTA neck: No aneurysm, dissection, or high-grade stenosis. CTV head: No dural venous sinus thrombosis. All CT scans at this facility use dose modulation, iterative reconstruction, and/or weight based dosing when appropriate to reduce radiation dose to as low as reasonably achievable. CT head without contrast    Result Date: 4/23/2022  EXAMINATION: CT of the brain without contrast HISTORY: Intracerebral hemorrhage. COMPARISON: CT brain April 23, 2022 0311 hours TECHNIQUE: Multiple contiguous axial images were obtained of the brain from the skull base through the vertex. Multiplanar reformats were obtained. FINDINGS: No significant interval change in the amount of hemorrhage within the occipital horn of the left lateral ventricle. Interval development of a small amount of hemorrhage within the bicipital groove the right lateral ventricle. Unchanged morphology of the ventricles.  Mild generalized parenchymal volume loss. Areas of bilateral supratentorial white matter hypoattenuation are nonspecific but most likely related to chronic small vessel ischemic changes in a patient of this age. Gray-white matter differentiation is preserved. No mass effect or midline shift. The visualized paranasal sinuses and mastoid air cells are clear. Calvarium is intact. Interval development of a small amount of hemorrhage within the occipital horn the right lateral ventricle. No significant interval change in the amount of hemorrhage within the occipital horn of the left lateral ventricle. Unchanged morphology of the ventricles. All CT scans at this facility use dose modulation, iterative reconstruction, and/or weight based dosing when appropriate to reduce radiation dose to as low as reasonably achievable. CT HEAD WO CONTRAST    Result Date: 4/23/2022  EXAMINATION: CT of the brain without contrast HISTORY: Headache COMPARISON: CT brain August 10, 2010 TECHNIQUE: Multiple contiguous axial images were obtained of the brain from the skull base through the vertex. Multiplanar reformats were obtained. FINDINGS: Mild generalized parenchymal volume loss. Areas of bilateral supratentorial white matter hypoattenuation are nonspecific but most likely related to chronic small vessel ischemic changes in a patient of this age. Gray-white matter differentiation is preserved. There is a tiny amount of hyperdense material within the occipital horn of the left lateral ventricle. No intraparenchymal, subarachnoid, or subdural hemorrhage. Mild prominence of the ventricular system. No mass effect or midline shift. The visualized paranasal sinuses and mastoid air cells are clear. Calvarium is intact. Small amount of intraventricular hemorrhage within the occipital horn of the left lateral ventricle.  Mild prominence of the ventricles is nonspecific and may be due to generalized parenchymal volume loss however developing hydrocephalus cerebral arteries are patent. The left posterior indicating artery is patent. The right posterior communicating artery is not definitively visualized. The bilateral anterior cerebral arteries and anterior communicating artery are patent. No aneurysm or high-grade stenosis of the cerebral vasculature. CT venography: No filling defect within the dural venous sinuses. CTA head: No aneurysm or high-grade stenosis. CTA neck: No aneurysm, dissection, or high-grade stenosis. CTV head: No dural venous sinus thrombosis. All CT scans at this facility use dose modulation, iterative reconstruction, and/or weight based dosing when appropriate to reduce radiation dose to as low as reasonably achievable. XR CHEST PORTABLE    Result Date: 4/23/2022  Exam: XR CHEST PORTABLE History: Cough Technique: AP portable view of the chest obtained. Comparison: Chest x-rays October 11, 2010. CT chest May 4, 2017 Findings: Atherosclerotic calcification of the thoracic aorta. The cardiomediastinal silhouette is within normal limits. No pneumothorax, pleural effusion, or consolidation. Bones of the thorax appear intact. No radiographic evidence of acute intrathoracic process. CTA VENOUS HEAD W WO CONTRAST    Result Date: 4/23/2022  Exam: CT angiography of the head CT angiography of the neck CT venography of the head. History: Intraventricular hemorrhage Technique: Multiple contiguous axial images were obtained from the thoracic inlet through the [place] after administration of intravenous contrast. Multiplanar reformatted images and multiplanar maximum intensity projection images were obtained,  as were postprocessed 3-D volume rendered images. Luminal narrowings are estimated using NASCET criteria. CT venography performed with contrast. Comparison: None available Findings: CTA neck: There is a three-vessel  aortic arch with normal origins of the brachiocephalic, left common carotid and left subclavian arteries. Atherosclerotic calcification of the aortic arch. The vertebral arteries originate from the subclavian arteries. No significant stenosis of the great vessel origins or the origin of the carotid or vertebral arteries. Atherosclerotic calcification of the carotid bulbs resulting in less than 50% stenosis. The bilateral common carotid arteries are of normal course and caliber. The bilateral internal and external carotid arteries are of normal course and caliber. Cervical vertebral arteries are patent. The left vertebral artery is dominant. No dissection, high grade stenosis or aneurysm. The right jugular vein courses through the posterior soft tissues of the neck, an anatomic variant. Degenerative changes of the cervical spine. CTA head: The internal carotid arteries through the skull base are patent. The bilateral middle cerebral arteries through the trifurcation and opercular branches are patent. The vertebrobasilar system including the superior cerebellar and posterior cerebral arteries are patent. The left posterior indicating artery is patent. The right posterior communicating artery is not definitively visualized. The bilateral anterior cerebral arteries and anterior communicating artery are patent. No aneurysm or high-grade stenosis of the cerebral vasculature. CT venography: No filling defect within the dural venous sinuses. CTA head: No aneurysm or high-grade stenosis. CTA neck: No aneurysm, dissection, or high-grade stenosis. CTV head: No dural venous sinus thrombosis. All CT scans at this facility use dose modulation, iterative reconstruction, and/or weight based dosing when appropriate to reduce radiation dose to as low as reasonably achievable. Assessment and Plan   1. Intraventricular hemorrhage  1. BP control, CTA, CTV, neuro and nsgy consult, ok for floor per neurology. Hold a/c  2. Mild hyponatremia  1. Monitor  3. DVT proph  1.  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

## 2022-04-23 NOTE — CARE COORDINATION
[facility] [place] Case Management Initial Discharge Assessment    Met with Patient to discuss discharge plan. PCP: Marlene Alex DO                                Date of Last Visit: 6 months ago    [facility] Patient: No        [facility] Notified: no    If no PCP, list provided? N/A    Discharge Planning    Living Arrangements: independently at home    Who do you live with? self    Who helps you with your care:  self    If lives at home:     Do you have any barriers navigating in your home? no    Patient can perform ADL? Yes    Current Services (outpatient and in home) :  None    Dialysis: No    Is transportation available to get to your appointments? Yes    DME Equipment:  no    Respiratory equipment: None    Respiratory provider:  no     [facility]    Consult with Medication Assistance Program?  No      [facility]ined    Patient agreeable to SNF/Rehab? Declined    Other discharge needs identified? N/A    Does Patient Have a High-Risk for Readmission Diagnosis (CHF, PN, MI, COPD)? No      Initial Discharge Plan? (Note: please see concurrent daily documentation for any updates after initial note). Pt denies any d/c needs in ER. Cm to assess for any further d/c needs and referrals.     Readmission Risk              Risk of Unplanned Readmission:  7         Electronically signed by Jeferson Lorenzo RN on 4/23/2022 at 12:07 PM

## 2022-04-23 NOTE — CONSULTS
Patient Name: Salome Kitchen  Admit Date: 2022  1:13 AM  MR #: [id number]  : 1945    Attending Physician: Monika Noel MD  Reason for consult: Spontaneous left intraventricular hemorrhage    History of Presenting Illness and Review of Systems     Salome Kitchen is a 78 y.o. female on hospital day 0 . History Of Present Illness:  57-year-old female has had several days of headache fever myalgias diarrhea chills not feeling well. No nausea or vomiting. Neurologically intact. CT scan showed a very tiny left ventricular hemorrhage with no parenchymal changes. Not on any anticoagulants. Past history of hyperlipidemia and Beasley's esophagitis undergoing procedures twice. Gallbladder surgery otherwise no major operations. Medications for hyperlipidemia statin. History:      History reviewed. No pertinent past medical history. History reviewed. No pertinent surgical history. Family History  History reviewed. No pertinent family history. [] Unable to obtain due to ventilated and/ or neurologic status    Social History     Socioeconomic History    Marital status: Single     Spouse name: Not on file    Number of children: Not on file    Years of education: Not on file    Highest education level: Not on file   Occupational History    Not on file   Tobacco Use    Smoking status: Never Smoker    Smokeless tobacco: Not on file   Substance and Sexual Activity    Alcohol use: Not on file    Drug use: Not on file    Sexual activity: Not on file   Other Topics Concern    Not on file   Social History Narrative    Not on file     Social Determinants of Health     Financial Resource Strain:     Difficulty of Paying Living Expenses: Not on file   Food Insecurity:     Worried About Running Out of Food in the Last Year: Not on file    Jayleen of Food in the Last Year: Not on file   Transportation Needs:     Lack of Transportation (Medical):  Not on file    Lack of Transportation (Non-Medical): Not on file   Physical Activity:     Days of Exercise per Week: Not on file    Minutes of Exercise per Session: Not on file   Stress:     Feeling of Stress : Not on file   Social Connections:     Frequency of Communication with Friends and Family: Not on file    Frequency of Social Gatherings with Friends and Family: Not on file    Attends Oriental orthodox Services: Not on file    Active Member of [address] [place] [address] or Organizations: Not on file    Attends Club or Organization Meetings: Not on file    Marital Status: Not on file   Intimate Partner Violence:     Fear of Current or Ex-Partner: Not on file    Emotionally Abused: Not on file    Physically Abused: Not on file    Sexually Abused: Not on file   Housing Stability:     Unable to Pay for Housing in the Last Year: Not on file    Number of [place] in the Last Year: Not on file    Unstable Housing in the Last Year: Not on file        Home Medications:      Not in a hospital admission. Current Hospital Medications:     Scheduled Meds:  Continuous Infusions:   niCARdipine       PRN Meds:. .   niCARdipine          Allergies: Allergies   Allergen Reactions    Hydromorphone      Other reaction(s): Mental Status Change  Hard time understanding people, and vomiting    Penicillins      Other reaction(s): Other: See Comments  convulsion          REVIEW OF SYSTEMS    (2-9 systems for level 4, 10 or more for level 5)     Constitutional: Negative for chills, diaphoresis. complains of fatigue, fever, lethargy  HENT: Negative for congestion, rhinorrhea, sore throat. Eyes: Negative for photophobia, blurred vision, diplopia, redness, visual change. Respiratory: Negative for cough and shortness of breath. Cardiovascular: Negative for chest pain and palpitations. Gastrointestinal: Negative for abdominal pain, nausea, vomiting. Has diarrhea  Genitourinary: Negative for dysuria, flank pain.    Musculoskeletal: Negative for joint pains, stiffness, swelling, decreased range of motion. Complaining of muscle aching or myalgias. Integumentary (skin and /or breast):  Negative for rash, ulcer, mass, pruritus. Neurological: Negative for dizziness, light-headedness. New onset of several days headaches. Endocrine: Negative for weight changes, sweating, heat intolerance, cold intolerance. Hematologic/Lymphatic: Negative for bleeding, anemia, easy bleeding, bruising. Allergic/Immunologic: Negative for swelling, itching, sneezing, anaphylaxis. Psychiatric/Behavioral: Negative for behavioral problems, confusion, hallucinations, mood changes. All other systems reviewed and are negative. Except as noted above the remainder of the review of systems was reviewed and negative. Physical Exam     Physical Exam:  Vitals and notes reviewed. No fever in the emergency room  Constitutional:  See above height, weight, pulse rate, and blood pressure. Patient is sleepy but easily arousable and responds appropriately. Her son here. BMI      Appearance: Normal appearance. Head:      Normocephalic and atraumatic. Ears, Nose, Mouth, and Throat:      Normal shape, no discharge, deglutition intact  Eyes:      Extraocular Movements: Extraocular movements intact. Pupils: Pupils are equal, round, and reactive to light. Cardiovascular:      Pulses: Normal radialis pulses. Heart sounds: Normal heart sounds, regular rate, no rubs or murmurs. Respiratory:      lungs clear to auscultation  Abdomen:        Soft to palpation. Bowel sounds present. Genitourinary:      Normal for sex  Musculoskeletal:      Gait: Regular walk and toe walk and heel walk intact. General: Normal range of motion. Extremities well developed and symmetric. Muscle tone normal with no spasticity or fasciculations. Skin:     General: Skin is warm and dry. Capillary Refill: Capillary refill less than 2 seconds. Neurological: Sleepy but arousable.   No meningismus Mental Status: Alert and oriented to person, place, and time. Cranial nerves individually tested 2 through 12 normal  Hematologic/Lymphatic/Immunologic:      Negative for lymphadenopathy, hematomas. Psychiatric:         Mood and Affect: Mood normal. Appropriate affect    I have reviewed all laboratory studies, reports, data, and pertinent images. Objective Findings:     Vitals:   Vitals:    04/23/22 0116 04/23/22 0409   BP: (!) 160/78    Pulse: 79 66   Resp: 18 16   Temp: 98.7 °F (37.1 °C)    TempSrc: Oral    SpO2: 98% 99%   Weight: 120 lb (54.4 kg)    Height: 5' 2\" (1.575 m)         Laboratory, Microbiology, Pathology, Radiology, Cardiology, Medications and Transcriptions reviewed  Scheduled Meds:  Continuous Infusions:   niCARdipine         Recent Labs     04/23/22 0215   WBC 7.0   HGB 13.1   HCT 38.1   MCV 87.7        Recent Labs     04/23/22 0215   *   K 3.9   CL 96   CO2 23   BUN 12   CREATININE 0.49*     Recent Labs     04/23/22 0215   AST 18   ALT 16   BILITOT 0.7   ALKPHOS 59     No results for input(s): LIPASE, AMYLASE in the last 72 hours. Recent Labs     04/23/22 0215   PROT 6.8     4/23/2022 CT head shows small amount of layered hemorrhage posterior left ventricle. No other bleeding in any area of the brain no parenchymal changes except for periventricular microvascular changes. Impression:   Spontaneous tiny left intraventricular hemorrhage. Plan:   Neurological consultation and medical evaluation. Comments: The tiny bleeding in the left ventricle appears chronic and is not by itself the basis for her clinical symptoms. Requires complete neurologic evaluation. Not a candidate for neuro surgery at this time. Neurosurgery will follow.         Electronically signed by Feliciano Byrd MD on 4/23/2022 at 4:57 AM

## 2022-04-23 NOTE — ED TRIAGE NOTES
PT COMES TO THE ED TODAY WITH COMPLAINTS OF A FEVER  PT IS A FEBRILE IN TRIAGE AT 98.7  PT STATES SHE HAS BEEN HAVING FEVERS AND FATIGUE SINCE Monday   PT STATES SHE HAS ALSO HAD DIARRHEA AND BAD HEADACHES AND CHILLS   DENIES ANY NAUSEA OR VOMITING   PT STATES SHE TOOK A TYLENOL WITH FEVER RELIEF AROUND 6PM     PT STATES SHE HAS HAD A FEVER BUT SHE DOES NOT HAVE A THERMOMETER AT HOME SHE STATES SHE JUST FEELS WARM     PT STABLE IN TRIAGE   BREATHING IS EQUAL AND UNLABORED   KIN W/P/

## 2022-04-23 NOTE — PROGRESS NOTES
Pt arrived to  /room 226. Pt A&O x4. She reports a \"dull\" h/a at this time. Vital assessed, recorded-pt afebrile. Skin assessed by two RNs, see assessment. Call to [facility] pharmacy/Eric for pts medication list.  Later on this shift, pt developed fever-100.3. PRN Tylenol given at 1825. Pts son at the bedside, updated on pts POC at this time. Pt educated on bedrest order. Call light in reach, pt safety maintained.

## 2022-04-23 NOTE — PROGRESS NOTES
[facility] Occupational Therapy      Date: 2022  Patient Name: Nasim Aguilera        MRN: [id number]  Account: [de-identified]   : 1945  (78 y.o.)  Room: [place] [address]    Chart reviewed, attempted OT at 3:55 p.m.  for eval. Patient not seen 2° to: Other: Pt on strict bed rest.     Spoke to SHIRLEY Hooper RN aware. Will attempt again when able.     Electronically signed by AMINATA Vee on 2022 at 3:55 PM

## 2022-04-24 LAB
ANION GAP SERPL CALCULATED.3IONS-SCNC: 11 MEQ/L (ref 9–15)
BUN BLDV-MCNC: 9 MG/DL (ref 8–23)
CALCIUM SERPL-MCNC: 8 MG/DL (ref 8.5–9.9)
CHLORIDE BLD-SCNC: 102 MEQ/L (ref 95–107)
CO2: 21 MEQ/L (ref 20–31)
CREAT SERPL-MCNC: 0.46 MG/DL (ref 0.5–0.9)
GFR AFRICAN AMERICAN: >60
GFR NON-AFRICAN AMERICAN: >60
GLUCOSE BLD-MCNC: 85 MG/DL (ref 70–99)
HBA1C MFR BLD: 5.8 % (ref 4.8–5.9)
HCT VFR BLD CALC: 34.3 % (ref 37–47)
HEMOGLOBIN: 11.6 G/DL (ref 12–16)
MCH RBC QN AUTO: 29.9 PG (ref 27–31.3)
MCHC RBC AUTO-ENTMCNC: 33.9 % (ref 33–37)
MCV RBC AUTO: 88.3 FL (ref 82–100)
PDW BLD-RTO: 12.7 % (ref 11.5–14.5)
PLATELET # BLD: 194 K/UL (ref 130–400)
POTASSIUM SERPL-SCNC: 3.7 MEQ/L (ref 3.4–4.9)
RBC # BLD: 3.88 M/UL (ref 4.2–5.4)
SODIUM BLD-SCNC: 134 MEQ/L (ref 135–144)
WBC # BLD: 5.5 K/UL (ref 4.8–10.8)

## 2022-04-24 PROCEDURE — 97161 PT EVAL LOW COMPLEX 20 MIN: CPT

## 2022-04-24 PROCEDURE — 83036 HEMOGLOBIN GLYCOSYLATED A1C: CPT

## 2022-04-24 PROCEDURE — 36415 COLL VENOUS BLD VENIPUNCTURE: CPT

## 2022-04-24 PROCEDURE — 6370000000 HC RX 637 (ALT 250 FOR IP): Performed by: FAMILY MEDICINE

## 2022-04-24 PROCEDURE — 80048 BASIC METABOLIC PNL TOTAL CA: CPT

## 2022-04-24 PROCEDURE — 1210000000 HC MED SURG R&B

## 2022-04-24 PROCEDURE — 92523 SPEECH SOUND LANG COMPREHEN: CPT

## 2022-04-24 PROCEDURE — 99233 SBSQ HOSP IP/OBS HIGH 50: CPT | Performed by: PSYCHIATRY & NEUROLOGY

## 2022-04-24 PROCEDURE — 85027 COMPLETE CBC AUTOMATED: CPT

## 2022-04-24 RX ADMIN — ACETAMINOPHEN 650 MG: 325 TABLET ORAL at 15:09

## 2022-04-24 RX ADMIN — ACETAMINOPHEN 650 MG: 325 TABLET ORAL at 00:08

## 2022-04-24 ASSESSMENT — PAIN DESCRIPTION - LOCATION
LOCATION: HEAD
LOCATION: HEAD

## 2022-04-24 ASSESSMENT — ENCOUNTER SYMPTOMS
TROUBLE SWALLOWING: 0
NAUSEA: 0
BACK PAIN: 0
CHOKING: 0
SHORTNESS OF BREATH: 0
PHOTOPHOBIA: 0
VOMITING: 0
COLOR CHANGE: 0

## 2022-04-24 ASSESSMENT — PAIN SCALES - GENERAL
PAINLEVEL_OUTOF10: 2
PAINLEVEL_OUTOF10: 3

## 2022-04-24 ASSESSMENT — PAIN DESCRIPTION - DESCRIPTORS
DESCRIPTORS: DISCOMFORT
DESCRIPTORS: ACHING

## 2022-04-24 ASSESSMENT — PAIN DESCRIPTION - ORIENTATION: ORIENTATION: OTHER (COMMENT)

## 2022-04-24 ASSESSMENT — PAIN DESCRIPTION - PAIN TYPE: TYPE: ACUTE PAIN

## 2022-04-24 NOTE — CARE COORDINATION
MET WITH PATIENT, FREEDOM OF CHOICE OFFERED. STATES PLAN IS TO RETURN HOME, BROTHER WILL COME AND HELP IF NEEDED. DENIES SNF, REHAB, OR NEED FOR DME AT THIS TIME.

## 2022-04-24 NOTE — PROGRESS NOTES
Component Value Date    NITRU Negative 04/23/2022    WBCUA >100 04/10/2012    BACTERIA 1+ 04/10/2012    BLOODU Negative 04/23/2022    SPECGRAV 1.016 04/23/2022    GLUCOSEU Negative 04/23/2022    GLUCOSEU NEG 04/10/2012       Radiology:   Most recent    Chest CT      WITH CONTRAST:Results for orders placed during the hospital encounter of 05/01/17    CT CHEST W CONTRAST    Narrative  CT CHEST, ABDOMEN AND PELVIS WITH CONTRAST: 5/4/2017    CLINICAL HISTORY: Weight loss, right upper quadrant abdominal pain, and dark stools. COMPARISON: 12/8/2014. TECHNIQUE: Spiral images were obtained of the abdomen and pelvis after the uneventful intravenous administration of approximately 100 mL of Isovue-370 contrast. Oral contrast was used for bowel opacification. All CT scans at this facility use dose modulation, iterative reconstruction, and/or weight based dosing when appropriate to reduce radiation dose to as low as reasonably achievable. CHEST CT FINDINGS:    There is no significant change identified from the prior study. Minimal probable scarring of the anteromedial lung bases appears unchanged. There are no worrisome nodules, significant pulmonary infiltrates, lymphadenopathy, pleural or pericardial effusions. Mild atherosclerotic plaquing of a normal caliber thoracic aorta is again noted. The heart is not enlarged. ABDOMEN AND PELVIS CT FINDINGS:    The gallbladder has been removed. The liver, spleen, pancreas, adrenal glands, kidneys, great vessels, uterus, adnexal regions, urinary bladder, appendix, small and large bowel loops, and additional images of pelvis appear within normal limits; with mild  atherosclerotic plaquing of normal caliber abdominal aorta and branch vessels again noted. Degenerative changes, predominantly of the left hip are again noted    Impression  NO ACUTE OR SIGNIFICANT CHANGE FROM 12/8/2014 IDENTIFIED.        WITHOUT CONTRAST: No results found for this or any previous visit.      CXR      2-view: No results found for this or any previous visit. Portable: Results for orders placed during the hospital encounter of 04/23/22    XR CHEST PORTABLE    Narrative  Exam: XR CHEST PORTABLE    History: Cough    Technique: AP portable view of the chest obtained. Comparison: Chest x-rays October 11, 2010. CT chest May 4, 2017    Findings:    Atherosclerotic calcification of the thoracic aorta. The cardiomediastinal silhouette is within normal limits. No pneumothorax, pleural effusion, or consolidation. Bones of the thorax appear intact. Impression  No radiographic evidence of acute intrathoracic process. Echo No results found for this or any previous visit. Assessment/Plan:    Active Hospital Problems    Diagnosis Date Noted    Intraventricular hemorrhage, nontraumatic ([address] [I61.5] 04/23/2022     Priority: Medium    Intraventricular hemorrhage ([address] [I61.5] 04/23/2022     Priority: Medium    Other hyperlipidemia [E78.49] 04/23/2022     Priority: Medium    Beasley's esophagus with esophagitis [K22.70, K20.90] 04/23/2022     Priority: Medium     1. Intraventricular hemorrhage  1. BP control, CTA, CTV, neuro and nsgy consult, ok for floor per neurology. Hold a/c  2. 4/24 - holding asa, cardiology c/s regarding need for asa as this may be spontaneous due to asa. PT/OT eval, dc planning. 2. Mild hyponatremia  1. Monitor  3. DVT proph  1.  SCDs      Electronically signed by Myah Robledo MD on 4/24/2022 at 10:55 AM

## 2022-04-24 NOTE — PROGRESS NOTES
Neurology Follow up    SUBJECTIVE: Patient actually feels much better with very mild headache without any other abnormalities. Complete work-up has been done and MRI was reviewed. Patient's son at the bedside findings discussed. She remains nonfocal though we will reassess her physical therapy tomorrow to see if she can go home    Current Facility-Administered Medications   Medication Dose Route Frequency Provider Last Rate Last Admin    acetaminophen (TYLENOL) tablet 650 mg  650 mg Oral Q4H PRN Alejandra Up MD   650 mg at 04/24/22 0008    ondansetron (ZOFRAN-ODT) disintegrating tablet 4 mg  4 mg Oral Q8H PRN Alejandra Up MD        Or    ondansetron [facility]) injection 4 mg  4 mg IntraVENous Q6H PRN Alejandra Up MD        LORazepam (ATIVAN) injection 1 mg  1 mg IntraVENous Q4H PRN Alejandra Up MD        labetalol (NORMODYNE;TRANDATE) injection 10 mg  10 mg IntraVENous Q10 Min PRN Alejandra Up MD           PHYSICAL EXAM:    BP (!) 139/56   Pulse 70   Temp 98.2 °F (36.8 °C)   Resp 18   Ht 5' 2\" (1.575 m)   Wt 115 lb 9.6 oz (52.4 kg)   SpO2 95%   BMI 21.14 kg/m²    General Appearance:      Skin:  normal  CVS - Normal sounds, No murmurs , No carotid Bruits  RS -CTA  Abdomen Soft, BS present  Review of Systems   Constitutional: Negative for fever. HENT: Negative for ear pain, tinnitus and trouble swallowing. Eyes: Negative for photophobia and visual disturbance. Respiratory: Negative for choking and shortness of breath. Cardiovascular: Negative for chest pain and palpitations. Gastrointestinal: Negative for nausea and vomiting. Musculoskeletal: Negative for back pain, gait problem, joint swelling, myalgias, neck pain and neck stiffness. Skin: Negative for color change. Allergic/Immunologic: Negative for food allergies. Neurological: Positive for headaches.  Negative for dizziness, tremors, seizures, syncope, facial asymmetry, speech difficulty, weakness, light-headedness and numbness. Psychiatric/Behavioral: Negative for behavioral problems, confusion, hallucinations and sleep disturbance. Mental Status Exam:             Level of Alertness:   awake            Orientation:   person, place, time                      Attention/Concentration:  normal            Language:  normal      Funduscopic Exam:     Cranial Nerves            Cranial nerve III           Pupils:  equal, round, reactive to light      Cranial nerves III, IV, VI           Extraocular Movements: intact      Cranial nerve V           Facial sensation:  intact      Cranial nerve VII           Facial strength: intact      Cranial nerve VIII           Hearing:  intact      Cranial nerve IX           Palate:  intact      Cranial nerve XI         Shoulder shrug:  intact      Cranial nerve XII          Tongue movement:  normal  No meningeal signs are notable.   Motor:    Drift:  absent  Motor exam is symmetrical 5 out of 5 all extremities bilaterally  Tone:  normal  Abnormal Movements:  absent            Sensory:        Pinprick             Right Upper Extremity:  normal             Left Upper Extremity:  normal             Right Lower Extremity:  normal             Left Lower Extremity:  normal           Vibration                         Touch            Proprioception                 Coordination:           Finger/Nose   Right:  normal              Left:  normal          Heel-Knee-Shin                Right:  normal              Left:  normal          Rapid Alternating Movements              Right:  normal              Left:  normal          Gait:                       Casual: Gait is deferred                      Romberg:          Reflexes:             Deep Tendon Reflexes:             Reflexes are 2 +             Plantar response:                Right:  downgoing               Left:  downgoing    Vascular:  Cardiac Exam:  normal         CTA HEAD W WO CONTRAST    Result Date: 4/23/2022  Exam: CT angiography of the head CT angiography of the neck CT venography of the head. History: Intraventricular hemorrhage Technique: Multiple contiguous axial images were obtained from the thoracic inlet through the [place] after administration of intravenous contrast. Multiplanar reformatted images and multiplanar maximum intensity projection images were obtained,  as were postprocessed 3-D volume rendered images. Luminal narrowings are estimated using NASCET criteria. CT venography performed with contrast. Comparison: None available Findings: CTA neck: There is a three-vessel  aortic arch with normal origins of the brachiocephalic, left common carotid and left subclavian arteries. Atherosclerotic calcification of the aortic arch. The vertebral arteries originate from the subclavian arteries. No significant stenosis of the great vessel origins or the origin of the carotid or vertebral arteries. Atherosclerotic calcification of the carotid bulbs resulting in less than 50% stenosis. The bilateral common carotid arteries are of normal course and caliber. The bilateral internal and external carotid arteries are of normal course and caliber. Cervical vertebral arteries are patent. The left vertebral artery is dominant. No dissection, high grade stenosis or aneurysm. The right jugular vein courses through the posterior soft tissues of the neck, an anatomic variant. Degenerative changes of the cervical spine. CTA head: The internal carotid arteries through the skull base are patent. The bilateral middle cerebral arteries through the trifurcation and opercular branches are patent. The vertebrobasilar system including the superior cerebellar and posterior cerebral arteries are patent. The left posterior indicating artery is patent. The right posterior communicating artery is not definitively visualized. The bilateral anterior cerebral arteries and anterior communicating artery are patent.  No aneurysm or high-grade brain without contrast HISTORY: Headache COMPARISON: CT brain August 10, 2010 TECHNIQUE: Multiple contiguous axial images were obtained of the brain from the skull base through the vertex. Multiplanar reformats were obtained. FINDINGS: Mild generalized parenchymal volume loss. Areas of bilateral supratentorial white matter hypoattenuation are nonspecific but most likely related to chronic small vessel ischemic changes in a patient of this age. Gray-white matter differentiation is preserved. There is a tiny amount of hyperdense material within the occipital horn of the left lateral ventricle. No intraparenchymal, subarachnoid, or subdural hemorrhage. Mild prominence of the ventricular system. No mass effect or midline shift. The visualized paranasal sinuses and mastoid air cells are clear. Calvarium is intact. Small amount of intraventricular hemorrhage within the occipital horn of the left lateral ventricle. Mild prominence of the ventricles is nonspecific and may be due to generalized parenchymal volume loss however developing hydrocephalus cannot be excluded. All CT scans at this facility use dose modulation, iterative reconstruction, and/or weight based dosing when appropriate to reduce radiation dose to as low as reasonably achievable. CTA NECK W WO CONTRAST    Result Date: 4/23/2022  Exam: CT angiography of the head CT angiography of the neck CT venography of the head. History: Intraventricular hemorrhage Technique: Multiple contiguous axial images were obtained from the thoracic inlet through the [place] after administration of intravenous contrast. Multiplanar reformatted images and multiplanar maximum intensity projection images were obtained,  as were postprocessed 3-D volume rendered images. Luminal narrowings are estimated using NASCET criteria. CT venography performed with contrast. Comparison: None available Findings: CTA neck:  There is a three-vessel  aortic arch with normal origins of the brachiocephalic, left common carotid and left subclavian arteries. Atherosclerotic calcification of the aortic arch. The vertebral arteries originate from the subclavian arteries. No significant stenosis of the great vessel origins or the origin of the carotid or vertebral arteries. Atherosclerotic calcification of the carotid bulbs resulting in less than 50% stenosis. The bilateral common carotid arteries are of normal course and caliber. The bilateral internal and external carotid arteries are of normal course and caliber. Cervical vertebral arteries are patent. The left vertebral artery is dominant. No dissection, high grade stenosis or aneurysm. The right jugular vein courses through the posterior soft tissues of the neck, an anatomic variant. Degenerative changes of the cervical spine. CTA head: The internal carotid arteries through the skull base are patent. The bilateral middle cerebral arteries through the trifurcation and opercular branches are patent. The vertebrobasilar system including the superior cerebellar and posterior cerebral arteries are patent. The left posterior indicating artery is patent. The right posterior communicating artery is not definitively visualized. The bilateral anterior cerebral arteries and anterior communicating artery are patent. No aneurysm or high-grade stenosis of the cerebral vasculature. CT venography: No filling defect within the dural venous sinuses. CTA head: No aneurysm or high-grade stenosis. CTA neck: No aneurysm, dissection, or high-grade stenosis. CTV head: No dural venous sinus thrombosis. All CT scans at this facility use dose modulation, iterative reconstruction, and/or weight based dosing when appropriate to reduce radiation dose to as low as reasonably achievable. XR CHEST PORTABLE    Result Date: 4/23/2022  Exam: XR CHEST PORTABLE History: Cough Technique: AP portable view of the chest obtained. Comparison: Chest x-rays October 11, 2010.  CT chest May 4, 2017 Findings: Atherosclerotic calcification of the thoracic aorta. The cardiomediastinal silhouette is within normal limits. No pneumothorax, pleural effusion, or consolidation. Bones of the thorax appear intact. No radiographic evidence of acute intrathoracic process. MRI BRAIN WO CONTRAST    Result Date: 4/23/2022  EXAM: MRI of the brain without contrast History: Cerebral amyloid. Intracranial hemorrhage. Technique: Multiplanar multisequence MRI of the brain was performed without contrast. Comparison: CT brain performed earlier on the same date Findings: Foci of white matter signal abnormality within the supratentorial white matter are nonspecific but most compatible with chronic small vessel ischemic changes in a patient of this age. Mild generalized parenchymal volume loss. Unchanged morphology of the ventricles. No  mass, mass effect, midline shift, or abnormal extra-axial fluid collection. Midline structures are within normal limits. The posterior fossa is within normal limits. There is no diffusion restriction. Susceptibility artifact is identified within the dependent occipital horns of the right and left lateral ventricles secondary to intraventricular hemorrhage in these locations. There are a few scattered tiny foci of susceptibility artifact within the bilateral temporal lobes likely representing sequela of prior microhemorrhages or cavernomas. The major intracranial vascular flow voids are maintained. Cranial nerves 7/8 complexes appear grossly unremarkable. The visualized paranasal sinuses and bilateral mastoid air cells are clear. Small amount of hemorrhage within the occipital horns of the bilateral lateral ventricles. No change in morphology of the ventricles. Generalized parenchymal volume loss and nonspecific white matter findings most compatible with chronic small vessel ischemic changes in a patient of this age.     CTA VENOUS HEAD W WO CONTRAST    Result Date: 4/23/2022  Exam: CT angiography of the head CT angiography of the neck CT venography of the head. History: Intraventricular hemorrhage Technique: Multiple contiguous axial images were obtained from the thoracic inlet through the [place] after administration of intravenous contrast. Multiplanar reformatted images and multiplanar maximum intensity projection images were obtained,  as were postprocessed 3-D volume rendered images. Luminal narrowings are estimated using NASCET criteria. CT venography performed with contrast. Comparison: None available Findings: CTA neck: There is a three-vessel  aortic arch with normal origins of the brachiocephalic, left common carotid and left subclavian arteries. Atherosclerotic calcification of the aortic arch. The vertebral arteries originate from the subclavian arteries. No significant stenosis of the great vessel origins or the origin of the carotid or vertebral arteries. Atherosclerotic calcification of the carotid bulbs resulting in less than 50% stenosis. The bilateral common carotid arteries are of normal course and caliber. The bilateral internal and external carotid arteries are of normal course and caliber. Cervical vertebral arteries are patent. The left vertebral artery is dominant. No dissection, high grade stenosis or aneurysm. The right jugular vein courses through the posterior soft tissues of the neck, an anatomic variant. Degenerative changes of the cervical spine. CTA head: The internal carotid arteries through the skull base are patent. The bilateral middle cerebral arteries through the trifurcation and opercular branches are patent. The vertebrobasilar system including the superior cerebellar and posterior cerebral arteries are patent. The left posterior indicating artery is patent. The right posterior communicating artery is not definitively visualized. The bilateral anterior cerebral arteries and anterior communicating artery are patent.  No aneurysm or high-grade stenosis of the cerebral vasculature. CT venography: No filling defect within the dural venous sinuses. CTA head: No aneurysm or high-grade stenosis. CTA neck: No aneurysm, dissection, or high-grade stenosis. CTV head: No dural venous sinus thrombosis. All CT scans at this facility use dose modulation, iterative reconstruction, and/or weight based dosing when appropriate to reduce radiation dose to as low as reasonably achievable. Recent Labs     04/23/22 0215 04/24/22  0556   WBC 7.0 5.5   HGB 13.1 11.6*    194     Recent Labs     04/23/22 0215 04/24/22  0556   * 134*   K 3.9 3.7   CL 96 102   CO2 23 21   BUN 12 9   CREATININE 0.49* 0.46*   GLUCOSE 102* 85     Recent Labs     04/23/22 0215   BILITOT 0.7   ALKPHOS 59   AST 18   ALT 16     Lab Results   Component Value Date    PROTIME 13.5 04/23/2022    INR 1.0 04/23/2022     No results found for: LITHIUM, DILFRTOT, VALPROATE    ASSESSMENT AND PLAN  For cerebral hemorrhage which appears to be a spontaneous intracerebral hemorrhage with some minor blood in the lateral horns of the lateral ventricle. There is no further increased bleeding. MRI was reviewed and [place] images showed that she has not had any  intracerebral hemorrhage or capillary leak or amyloid. This therefore is a spontaneous intracerebral hemorrhage and may be related to antiplatelet agent aspirin which is seen 0.1% patient's without trauma. The question is going to be of future use of antiplatelet agents and in the circumstances were no other causes found this could be recurrent and can be detrimental.  Patient was on aspirin for cardiac reasons and we will consult Dr. Juan Solmian for further discussion as we may have to keep her off the aspirin and continue to monitor her other risk factors. This consultation can be done tomorrow. Details of this are all discussed with the son and the patient and they understand.      Az Sparks MD, [id number], American Board of Psychiatry & Neurology  Board Certified in Vascular Neurology  Board Certified in Neuromuscular Medicine  Certified in Neurorehabilitation

## 2022-04-24 NOTE — FLOWSHEET NOTE
Assessment complete lungs clear but diminished. Has a slight headache still. AXOX4 denies any numbness or tingling at this time. Stroke booklet given to patient. Follows commands with no problems call light within reach.  Electronically signed by Joy Simms RN on 4/23/2022 at 9:20 PM

## 2022-04-24 NOTE — PROGRESS NOTES
[facility]   Facility/Department: [facility]  Speech Language Pathology  Initial Speech/Language/Cognitive Assessment    Francine Randall  : 1945 (4 y.o.)   MRN: [id number]  ROOM: [place] [address]  ADMISSION DATE: 2022  PATIENT DIAGNOSIS(ES): Intraventricular hemorrhage [facility]) [I61.5]  Chief Complaint   Patient presents with    Illness     Patient Active Problem List    Diagnosis Date Noted    Intraventricular hemorrhage, nontraumatic ([facility] [address] 2022    Intraventricular hemorrhage ([facility] [address] 2022    Other hyperlipidemia 2022    Beasley's esophagus with esophagitis 2022     History reviewed. No pertinent past medical history. History reviewed. No pertinent surgical history.     DATE ONSET: 2022    Date of Evaluation: 2022   Evaluating Therapist: MAHESH Fraire  Diagnosis: Motor speech, language, and cognitive abiliites assessed to be [facility]    Requires SLP Intervention: No     D/C Recommendations: No follow up therapy recommended post discharge    General  Previous level of function and limitations: patient denies limitations  General  Chart Reviewed: Yes  Patient assessed for rehabilitation services?: Yes  Family / Caregiver Present: Yes (Son)     Oral/Motor  Oral Motor   Labial: No impairment  Dentition: Full  Oral Hygiene: Moist;Clean  Lingual: No impairment  Velum: No Impairment  Mandible: No impairment  Gag: No Impairment    Motor Speech  Motor Speech  Apraxic Characteristics: None  Dysarthric Characteristics: None  Intelligibility: No impairment  Overall Impairment Severity: None    Comprehension  Auditory Comprehension  Comprehension: Within Functional Limits    Expression  Expression  Primary Mode of Expression: Verbal  Verbal Expression  Verbal Expression: Within functional limits    Cognition  Orientation  Overall Orientation Status: Within Functional Limits  Attention  Attention: Within Functional Limits  Memory  Memory: Within Functional Limits  Problem Solving  Problem Solving: Within Functional Limits  Numeric Reasoning  Numeric Reasoning:  (DNT)  Abstract Reasoning  Abstract Reasoning:  (DNT)  Safety/Judgment  Safety/Judgment: Within Functional Limits    Additional Assessments       Recommendations  Requires SLP Intervention: No  D/C Recommendations: No follow up therapy recommended post discharge  Patient Education: Results of SLE  Patient Education Response: Verbalizes understanding  Duration of Treatment: no f/u  Frequency of Treatment: no f/u    Education  Individuals consulted  Consulted and agree with results and recommendations: Patient; Family member  Family member consulted:  Bryan    Safety Devices  Safety Devices  Safety Devices in place: Yes  Type of devices: Bed alarm in place;Call light within reach  Restraints Initially in Place: No    Pain Assessment  Pain Assessment: Patient c/o pain  Pain Assessment: 0-10  Pain Level: 3  Pain Location: Head    Pain Re-assessment  Pain Reassessment: Patient c/o pain         Therapy Time  Time in:  0800  Time out: 0815  Total minutes: 15      Signature: Electronically signed by MAHESH Deal on 4/24/2022 at 9:25 AM

## 2022-04-24 NOTE — CONSULTS
Consults                                                                         [facility] HEART CARDIOLOGY CONSULTATION NOTE    PATIENT NAME: Georgette Reynaga  PATIENT MRN: [id number]  SERVICE DATE:  4/24/2022  SERVICE TIME: 2:50 PM    PRIMARY CARE PHYSICIAN: Edilson Gallo DO  CONSULTING CARDIOLOGIST : Raysa Chen MD [facility]  PRIMARY CARDIOLOGIST: Concetta Malik MD [facility]  ================================================================    REASON FOR CONSULTATION:      How important is ASA for this patient. Patient presented with intraventricular hemorrhage. History is as noted below. HPI:   Georgette Reynaga is a 78 y.o.  per neurology : 78 right-handed female admitted with a 1 week history of headache which is diffuse. She also had some mild fevers and therefore came to the hospital.  I was contacted at around 2:30 AM regarding the same. CT scan showed blood in the ventricle on the left which is quite small and she has suggestion of subarachnoid hemorrhage. There is no reports of any trauma. She is on aspirin on a daily basis. She did not have any previous history of migraine headaches though in the past she has been seen by someone regarding headaches and was told that she was leaking blood in the brain. The details of this are not clear we have no access to her records. This likely could be amyloid angiopathy only from the history. Patient at night underwent a complete evaluation with a CT angiogram which shows no aneurysm and we also obtained a CTV without any session of venous sinus thrombosis. Patient had somewhat elevated blood pressures and she was treated with Cardene for a brief period of time.   This morning she has a very dull headache but no fever she denies any neck pain there is no meningism and she appears to be otherwise intact in terms of her clinical evaluation.       CARDIAC HISTORY:     she has a history of atherosclerotic heart disease with cardiac catheterization March 2013 showing 65% stenosis of the left anterior descending artery and approximately 70% stenosis of the right coronary artery. Fractional flow reserve was 0.92 and medical therapy was recommended. She has a history of hyperlipidemia. Typically her blood pressures run on the low side. She had some vasovagal episodes in the past.    she has a history of atherosclerotic heart disease with cardiac catheterization March 2013 showing 65% stenosis of the left anterior descending artery and approximately 70% stenosis of the right coronary artery. Fractional flow reserve was 0.92 and medical therapy was recommended. She has a history of hyperlipidemia. Typically her blood pressures run on the low side. She had some vasovagal episodes in the past.    She was experiencing bouts of severe burning discomfort in the epigastrium and lower chest region. She also developed progressive hoarseness and her symptoms were felt to be related to gastroesophageal reflux disease. She was diagnosed with an ulcer and hiatal hernia she underwent surgical repair by Dr. Rubén Louis. She had some persistent difficulties and underwent a redo operation. She has some residual hoarseness but her swallowing is much improved. A preoperative stress echocardiogram on March 13, 2020 was negative for ischemia or infarction. LV ejection fraction was 50 to 55%. Valvular structure and function were normal.      Patient reports no chest pressure tightness heaviness palpitations lightheadedness presyncope or syncope. She is a petite female who is alert and oriented speaking in full sentences. She said that she is feeling better overall, and the intermittent intensity of her headache has decreased. She has been seen by neurology and neurosurgery and I have reviewed the notes. The spasms in her leg are also improving. She has no jugular venous distention no carotid bruit her lungs are clear heart sounds are regular abdomen is soft and extremities show no edema.   I elicit no focal Minutes of Exercise per Session: Not on file   Stress:     Feeling of Stress : Not on file   Social Connections:     Frequency of Communication with Friends and Family: Not on file    Frequency of Social Gatherings with Friends and Family: Not on file    Attends Restorationism Services: Not on file    Active Member of [address] [place] [address] or Organizations: Not on file    Attends Club or Organization Meetings: Not on file    Marital Status: Not on file   Intimate Partner Violence:     Fear of Current or Ex-Partner: Not on file    Emotionally Abused: Not on file    Physically Abused: Not on file    Sexually Abused: Not on file   Housing Stability:     Unable to Pay for Housing in the Last Year: Not on file    Number of [place] in the Last Year: Not on file    Unstable Housing in the Last Year: Not on file       MEDICATIONS:  Current Facility-Administered Medications   Medication Dose Route Frequency Provider Last Rate Last Admin    acetaminophen (TYLENOL) tablet 650 mg  650 mg Oral Q4H PRN Enoc Rivers MD   650 mg at 04/24/22 0008    ondansetron (ZOFRAN-ODT) disintegrating tablet 4 mg  4 mg Oral Q8H PRN Enoc Rivers MD        Or    ondansetron [facility]) injection 4 mg  4 mg IntraVENous Q6H PRN Enoc Rivers MD        LORazepam (ATIVAN) injection 1 mg  1 mg IntraVENous Q4H PRN Enoc Rivers MD        labetalol (NORMODYNE;TRANDATE) injection 10 mg  10 mg IntraVENous Q10 Min PRN Enoc Rivers MD             ALLERGIES AND DRUG INTOLERANCES:   Allergies   Allergen Reactions    Hydromorphone      Other reaction(s): Mental Status Change  Hard time understanding people, and vomiting    Penicillins      Other reaction(s):  Other: See Comments  convulsion       ROS:   Review of Systems      BP (!) 129/50   Pulse 76   Temp 98.2 °F (36.8 °C)   Resp 18   Ht 5' 2\" (1.575 m)   Wt 115 lb 9.6 oz (52.4 kg)   SpO2 100%   BMI 21.14 kg/m²   Physical Exam:  Physical Exam      EKG:  EKG: normal sinus rhythm. Imaging results:    CTA HEAD W WO CONTRAST    Result Date: 4/23/2022  Exam: CT angiography of the head CT angiography of the neck CT venography of the head. History: Intraventricular hemorrhage Technique: Multiple contiguous axial images were obtained from the thoracic inlet through the [place] after administration of intravenous contrast. Multiplanar reformatted images and multiplanar maximum intensity projection images were obtained,  as were postprocessed 3-D volume rendered images. Luminal narrowings are estimated using NASCET criteria. CT venography performed with contrast. Comparison: None available Findings: CTA neck: There is a three-vessel  aortic arch with normal origins of the brachiocephalic, left common carotid and left subclavian arteries. Atherosclerotic calcification of the aortic arch. The vertebral arteries originate from the subclavian arteries. No significant stenosis of the great vessel origins or the origin of the carotid or vertebral arteries. Atherosclerotic calcification of the carotid bulbs resulting in less than 50% stenosis. The bilateral common carotid arteries are of normal course and caliber. The bilateral internal and external carotid arteries are of normal course and caliber. Cervical vertebral arteries are patent. The left vertebral artery is dominant. No dissection, high grade stenosis or aneurysm. The right jugular vein courses through the posterior soft tissues of the neck, an anatomic variant. Degenerative changes of the cervical spine. CTA head: The internal carotid arteries through the skull base are patent. The bilateral middle cerebral arteries through the trifurcation and opercular branches are patent. The vertebrobasilar system including the superior cerebellar and posterior cerebral arteries are patent. The left posterior indicating artery is patent. The right posterior communicating artery is not definitively visualized.  The bilateral anterior cerebral arteries and anterior communicating artery are patent. No aneurysm or high-grade stenosis of the cerebral vasculature. CT venography: No filling defect within the dural venous sinuses. CTA head: No aneurysm or high-grade stenosis. CTA neck: No aneurysm, dissection, or high-grade stenosis. CTV head: No dural venous sinus thrombosis. All CT scans at this facility use dose modulation, iterative reconstruction, and/or weight based dosing when appropriate to reduce radiation dose to as low as reasonably achievable. CT head without contrast    Result Date: 4/23/2022  EXAMINATION: CT of the brain without contrast HISTORY: Intracerebral hemorrhage. COMPARISON: CT brain April 23, 2022 0311 hours TECHNIQUE: Multiple contiguous axial images were obtained of the brain from the skull base through the vertex. Multiplanar reformats were obtained. FINDINGS: No significant interval change in the amount of hemorrhage within the occipital horn of the left lateral ventricle. Interval development of a small amount of hemorrhage within the bicipital groove the right lateral ventricle. Unchanged morphology of the ventricles. Mild generalized parenchymal volume loss. Areas of bilateral supratentorial white matter hypoattenuation are nonspecific but most likely related to chronic small vessel ischemic changes in a patient of this age. Gray-white matter differentiation is preserved. No mass effect or midline shift. The visualized paranasal sinuses and mastoid air cells are clear. Calvarium is intact. Interval development of a small amount of hemorrhage within the occipital horn the right lateral ventricle. No significant interval change in the amount of hemorrhage within the occipital horn of the left lateral ventricle. Unchanged morphology of the ventricles.  All CT scans at this facility use dose modulation, iterative reconstruction, and/or weight based dosing when appropriate to reduce radiation dose to as low as reasonably achievable. CT HEAD WO CONTRAST    Result Date: 4/23/2022  EXAMINATION: CT of the brain without contrast HISTORY: Headache COMPARISON: CT brain August 10, 2010 TECHNIQUE: Multiple contiguous axial images were obtained of the brain from the skull base through the vertex. Multiplanar reformats were obtained. FINDINGS: Mild generalized parenchymal volume loss. Areas of bilateral supratentorial white matter hypoattenuation are nonspecific but most likely related to chronic small vessel ischemic changes in a patient of this age. Gray-white matter differentiation is preserved. There is a tiny amount of hyperdense material within the occipital horn of the left lateral ventricle. No intraparenchymal, subarachnoid, or subdural hemorrhage. Mild prominence of the ventricular system. No mass effect or midline shift. The visualized paranasal sinuses and mastoid air cells are clear. Calvarium is intact. Small amount of intraventricular hemorrhage within the occipital horn of the left lateral ventricle. Mild prominence of the ventricles is nonspecific and may be due to generalized parenchymal volume loss however developing hydrocephalus cannot be excluded. All CT scans at this facility use dose modulation, iterative reconstruction, and/or weight based dosing when appropriate to reduce radiation dose to as low as reasonably achievable. CTA NECK W WO CONTRAST    Result Date: 4/23/2022  Exam: CT angiography of the head CT angiography of the neck CT venography of the head. History: Intraventricular hemorrhage Technique: Multiple contiguous axial images were obtained from the thoracic inlet through the [place] after administration of intravenous contrast. Multiplanar reformatted images and multiplanar maximum intensity projection images were obtained,  as were postprocessed 3-D volume rendered images. Luminal narrowings are estimated using NASCET criteria.  CT venography performed with contrast. Comparison: None available Findings: CTA neck: There is a three-vessel  aortic arch with normal origins of the brachiocephalic, left common carotid and left subclavian arteries. Atherosclerotic calcification of the aortic arch. The vertebral arteries originate from the subclavian arteries. No significant stenosis of the great vessel origins or the origin of the carotid or vertebral arteries. Atherosclerotic calcification of the carotid bulbs resulting in less than 50% stenosis. The bilateral common carotid arteries are of normal course and caliber. The bilateral internal and external carotid arteries are of normal course and caliber. Cervical vertebral arteries are patent. The left vertebral artery is dominant. No dissection, high grade stenosis or aneurysm. The right jugular vein courses through the posterior soft tissues of the neck, an anatomic variant. Degenerative changes of the cervical spine. CTA head: The internal carotid arteries through the skull base are patent. The bilateral middle cerebral arteries through the trifurcation and opercular branches are patent. The vertebrobasilar system including the superior cerebellar and posterior cerebral arteries are patent. The left posterior indicating artery is patent. The right posterior communicating artery is not definitively visualized. The bilateral anterior cerebral arteries and anterior communicating artery are patent. No aneurysm or high-grade stenosis of the cerebral vasculature. CT venography: No filling defect within the dural venous sinuses. CTA head: No aneurysm or high-grade stenosis. CTA neck: No aneurysm, dissection, or high-grade stenosis. CTV head: No dural venous sinus thrombosis. All CT scans at this facility use dose modulation, iterative reconstruction, and/or weight based dosing when appropriate to reduce radiation dose to as low as reasonably achievable.      XR CHEST PORTABLE    Result Date: 4/23/2022  Exam: XR CHEST PORTABLE History: Cough Technique: AP portable view of the chest obtained. Comparison: Chest x-rays October 11, 2010. CT chest May 4, 2017 Findings: Atherosclerotic calcification of the thoracic aorta. The cardiomediastinal silhouette is within normal limits. No pneumothorax, pleural effusion, or consolidation. Bones of the thorax appear intact. No radiographic evidence of acute intrathoracic process. MRI BRAIN WO CONTRAST    Result Date: 4/23/2022  EXAM: MRI of the brain without contrast History: Cerebral amyloid. Intracranial hemorrhage. Technique: Multiplanar multisequence MRI of the brain was performed without contrast. Comparison: CT brain performed earlier on the same date Findings: Foci of white matter signal abnormality within the supratentorial white matter are nonspecific but most compatible with chronic small vessel ischemic changes in a patient of this age. Mild generalized parenchymal volume loss. Unchanged morphology of the ventricles. No  mass, mass effect, midline shift, or abnormal extra-axial fluid collection. Midline structures are within normal limits. The posterior fossa is within normal limits. There is no diffusion restriction. Susceptibility artifact is identified within the dependent occipital horns of the right and left lateral ventricles secondary to intraventricular hemorrhage in these locations. There are a few scattered tiny foci of susceptibility artifact within the bilateral temporal lobes likely representing sequela of prior microhemorrhages or cavernomas. The major intracranial vascular flow voids are maintained. Cranial nerves 7/8 complexes appear grossly unremarkable. The visualized paranasal sinuses and bilateral mastoid air cells are clear. Small amount of hemorrhage within the occipital horns of the bilateral lateral ventricles. No change in morphology of the ventricles.  Generalized parenchymal volume loss and nonspecific white matter findings most compatible with chronic small vessel bilateral anterior cerebral arteries and anterior communicating artery are patent. No aneurysm or high-grade stenosis of the cerebral vasculature. CT venography: No filling defect within the dural venous sinuses. CTA head: No aneurysm or high-grade stenosis. CTA neck: No aneurysm, dissection, or high-grade stenosis. CTV head: No dural venous sinus thrombosis. All CT scans at this facility use dose modulation, iterative reconstruction, and/or weight based dosing when appropriate to reduce radiation dose to as low as reasonably achievable. LABS:  Recent Labs     04/23/22 0215 04/23/22 0445 04/24/22  0556   WBC 7.0  --  5.5   HCT 38.1  --  34.3*     --  194   INR  --  1.0  --    *  --  134*   K 3.9  --  3.7   CO2 23  --  21   BUN 12  --  9   MG 2.1  --   --      CBC:   Recent Labs     04/23/22 0215 04/24/22  0556   WBC 7.0 5.5   HGB 13.1 11.6*   HCT 38.1 34.3*    194     BMP:  Recent Labs     04/23/22 0215 04/23/22 0215 04/24/22  0556   *  --  134*   K 3.9  --  3.7   CL 96  --  102   CO2 23  --  21   BUN 12  --  9   CREATININE 0.49*  --  0.46*   LABGLOM >60.0   < > >60.0    < > = values in this interval not displayed. ABGs: No results found for: PH, PO2, PCO2  INR:   Recent Labs     04/23/22 0445   INR 1.0     PRO-BNP: No results found for: PROBNP   TSH:   Lab Results   Component Value Date    TSH 2.855 04/10/2012      Cardiac Injury Profile:   Recent Labs     04/23/22 0215   CKTOTAL 77      Lipid Profile:   Lab Results   Component Value Date    TRIG 51 08/19/2021    HDL 79 08/19/2021    LDLCALC 45 08/19/2021    CHOL 134 08/19/2021      Hemoglobin A1C: No components found for: HGBA1C     No intake or output data in the 24 hours ending 04/24/22 1450     IMPRESSIONS:  1. Patient with intraventricular hemorrhage. 2. Has documented coronary artery disease but no recent PCI or stenting  3. Hypertension  4.  Negative stress echocardiogram for ischemia in 2000    RECOMMENDATIONS:  1. Risk-benefit favors discontinuation of aspirin.      ================================================================      Thank you for your consideration for this consultation.     SIGNATURE: Electronically signed by Josselyn Don MD on 4/24/2022 at 2:50 PM

## 2022-04-24 NOTE — PROGRESS NOTES
assistance levels  Independent = pt does not require any physical supervision or assistance from another person for activity completion. Device may be needed.   Stand by assistance = pt requires verbal cues or instructions from another person, close to but not touching, to perform the activity  Minimal assistance= pt performs 75% or more of the activity; assistance is required to complete the activity  Moderate assistance= pt performs 50% of the activity; assistance is required to complete the activity  Maximal assistance = pt performs 25% of the activity; assistance is required to complete the activity  Dependent = pt requires total physical assistance to accomplish the task

## 2022-04-25 VITALS
DIASTOLIC BLOOD PRESSURE: 57 MMHG | HEIGHT: 62 IN | RESPIRATION RATE: 18 BRPM | TEMPERATURE: 97.9 F | HEART RATE: 81 BPM | OXYGEN SATURATION: 100 % | BODY MASS INDEX: 21.27 KG/M2 | WEIGHT: 115.6 LBS | SYSTOLIC BLOOD PRESSURE: 126 MMHG

## 2022-04-25 LAB
EKG ATRIAL RATE: 76 BPM
EKG P AXIS: 85 DEGREES
EKG P-R INTERVAL: 162 MS
EKG Q-T INTERVAL: 390 MS
EKG QRS DURATION: 84 MS
EKG QTC CALCULATION (BAZETT): 438 MS
EKG R AXIS: 48 DEGREES
EKG T AXIS: 47 DEGREES
EKG VENTRICULAR RATE: 76 BPM

## 2022-04-25 PROCEDURE — 93010 ELECTROCARDIOGRAM REPORT: CPT | Performed by: INTERNAL MEDICINE

## 2022-04-25 PROCEDURE — APPSS30 APP SPLIT SHARED TIME 16-30 MINUTES: Performed by: NURSE PRACTITIONER

## 2022-04-25 PROCEDURE — 99233 SBSQ HOSP IP/OBS HIGH 50: CPT | Performed by: PSYCHIATRY & NEUROLOGY

## 2022-04-25 ASSESSMENT — ENCOUNTER SYMPTOMS
TROUBLE SWALLOWING: 0
COLOR CHANGE: 0
BACK PAIN: 0
PHOTOPHOBIA: 0
NAUSEA: 0
VOMITING: 0
SHORTNESS OF BREATH: 0
CHOKING: 0

## 2022-04-25 NOTE — DISCHARGE SUMMARY
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 Evaluated by cardiology, neurology and neurosurgery and decision was made with patient and son to stop her aspirin. She is ok for discharge; understands what symptoms should prompt her to return to the ER; repeat CT was arranged by neurology. Exam on discharge:   BP (!) 126/57   Pulse 81   Temp 97.9 °F (36.6 °C) (Oral)   Resp 18   Ht 5' 2\" (1.575 m)   Wt 115 lb 9.6 oz (52.4 kg)   SpO2 100%   BMI 21.14 kg/m²   General appearance: No apparent distress, appears stated age and cooperative. HEENT:  Conjunctivae/corneas clear. Neck: Trachea midline. Respiratory:  Normal respiratory effort. Clear to auscultation  Cardiovascular: Regular rate and rhythm   Abdomen: Soft, non-tender, non-distended with normal bowel sounds. Musculoskeletal: No clubbing, cyanosis or edema bilaterally. Neuro: Non Focal.   Capillary Refill: Brisk,< 3 seconds   Peripheral Pulses: +2 palpable, equal bilaterally     Patient was seen by the following consultants   Consults:  IP CONSULT TO NEUROSURGERY  IP CONSULT TO NEUROSURGERY  IP CONSULT TO PHARMACY  PHARMACY TO CHANGE BASE FLUIDS  IP CONSULT TO NEUROLOGY  IP CONSULT TO CRITICAL CARE  IP CONSULT TO CARDIOLOGY    Significant Diagnostic Studies:    Refer to chart     Please refer to chart if no studies are shown here    CTA HEAD W WO CONTRAST    Result Date: 2022  Exam: CT angiography of the head CT angiography of the neck CT venography of the head.  History: Intraventricular hemorrhage Technique: Multiple contiguous axial images were obtained from the thoracic inlet through the [place] after administration of intravenous contrast. Multiplanar reformatted images and multiplanar maximum intensity projection images were obtained,  as were postprocessed 3-D volume rendered images. Luminal narrowings are estimated using NASCET criteria. CT venography performed with contrast. Comparison: None available Findings: CTA neck: There is a three-vessel  aortic arch with normal origins of the brachiocephalic, left common carotid and left subclavian arteries. Atherosclerotic calcification of the aortic arch. The vertebral arteries originate from the subclavian arteries. No significant stenosis of the great vessel origins or the origin of the carotid or vertebral arteries. Atherosclerotic calcification of the carotid bulbs resulting in less than 50% stenosis. The bilateral common carotid arteries are of normal course and caliber. The bilateral internal and external carotid arteries are of normal course and caliber. Cervical vertebral arteries are patent. The left vertebral artery is dominant. No dissection, high grade stenosis or aneurysm. The right jugular vein courses through the posterior soft tissues of the neck, an anatomic variant. Degenerative changes of the cervical spine. CTA head: The internal carotid arteries through the skull base are patent. The bilateral middle cerebral arteries through the trifurcation and opercular branches are patent. The vertebrobasilar system including the superior cerebellar and posterior cerebral arteries are patent. The left posterior indicating artery is patent. The right posterior communicating artery is not definitively visualized. The bilateral anterior cerebral arteries and anterior communicating artery are patent. No aneurysm or high-grade stenosis of the cerebral vasculature. CT venography: No filling defect within the dural venous sinuses. CTA head: No aneurysm or high-grade stenosis.  CTA neck: No aneurysm, dissection, or high-grade stenosis. CTV head: No dural venous sinus thrombosis. All CT scans at this facility use dose modulation, iterative reconstruction, and/or weight based dosing when appropriate to reduce radiation dose to as low as reasonably achievable. CT head without contrast    Result Date: 4/23/2022  EXAMINATION: CT of the brain without contrast HISTORY: Intracerebral hemorrhage. COMPARISON: CT brain April 23, 2022 0311 hours TECHNIQUE: Multiple contiguous axial images were obtained of the brain from the skull base through the vertex. Multiplanar reformats were obtained. FINDINGS: No significant interval change in the amount of hemorrhage within the occipital horn of the left lateral ventricle. Interval development of a small amount of hemorrhage within the bicipital groove the right lateral ventricle. Unchanged morphology of the ventricles. Mild generalized parenchymal volume loss. Areas of bilateral supratentorial white matter hypoattenuation are nonspecific but most likely related to chronic small vessel ischemic changes in a patient of this age. Gray-white matter differentiation is preserved. No mass effect or midline shift. The visualized paranasal sinuses and mastoid air cells are clear. Calvarium is intact. Interval development of a small amount of hemorrhage within the occipital horn the right lateral ventricle. No significant interval change in the amount of hemorrhage within the occipital horn of the left lateral ventricle. Unchanged morphology of the ventricles. All CT scans at this facility use dose modulation, iterative reconstruction, and/or weight based dosing when appropriate to reduce radiation dose to as low as reasonably achievable.     CT HEAD WO CONTRAST    Result Date: 4/23/2022  EXAMINATION: CT of the brain without contrast HISTORY: Headache COMPARISON: CT brain August 10, 2010 TECHNIQUE: Multiple contiguous axial images were obtained of the brain from the skull base through the vertex. Multiplanar reformats were obtained. FINDINGS: Mild generalized parenchymal volume loss. Areas of bilateral supratentorial white matter hypoattenuation are nonspecific but most likely related to chronic small vessel ischemic changes in a patient of this age. Gray-white matter differentiation is preserved. There is a tiny amount of hyperdense material within the occipital horn of the left lateral ventricle. No intraparenchymal, subarachnoid, or subdural hemorrhage. Mild prominence of the ventricular system. No mass effect or midline shift. The visualized paranasal sinuses and mastoid air cells are clear. Calvarium is intact. Small amount of intraventricular hemorrhage within the occipital horn of the left lateral ventricle. Mild prominence of the ventricles is nonspecific and may be due to generalized parenchymal volume loss however developing hydrocephalus cannot be excluded. All CT scans at this facility use dose modulation, iterative reconstruction, and/or weight based dosing when appropriate to reduce radiation dose to as low as reasonably achievable. CTA NECK W WO CONTRAST    Result Date: 4/23/2022  Exam: CT angiography of the head CT angiography of the neck CT venography of the head. History: Intraventricular hemorrhage Technique: Multiple contiguous axial images were obtained from the thoracic inlet through the [place] after administration of intravenous contrast. Multiplanar reformatted images and multiplanar maximum intensity projection images were obtained,  as were postprocessed 3-D volume rendered images. Luminal narrowings are estimated using NASCET criteria. CT venography performed with contrast. Comparison: None available Findings: CTA neck: There is a three-vessel  aortic arch with normal origins of the brachiocephalic, left common carotid and left subclavian arteries. Atherosclerotic calcification of the aortic arch.  The vertebral arteries originate from the subclavian arteries. No significant stenosis of the great vessel origins or the origin of the carotid or vertebral arteries. Atherosclerotic calcification of the carotid bulbs resulting in less than 50% stenosis. The bilateral common carotid arteries are of normal course and caliber. The bilateral internal and external carotid arteries are of normal course and caliber. Cervical vertebral arteries are patent. The left vertebral artery is dominant. No dissection, high grade stenosis or aneurysm. The right jugular vein courses through the posterior soft tissues of the neck, an anatomic variant. Degenerative changes of the cervical spine. CTA head: The internal carotid arteries through the skull base are patent. The bilateral middle cerebral arteries through the trifurcation and opercular branches are patent. The vertebrobasilar system including the superior cerebellar and posterior cerebral arteries are patent. The left posterior indicating artery is patent. The right posterior communicating artery is not definitively visualized. The bilateral anterior cerebral arteries and anterior communicating artery are patent. No aneurysm or high-grade stenosis of the cerebral vasculature. CT venography: No filling defect within the dural venous sinuses. CTA head: No aneurysm or high-grade stenosis. CTA neck: No aneurysm, dissection, or high-grade stenosis. CTV head: No dural venous sinus thrombosis. All CT scans at this facility use dose modulation, iterative reconstruction, and/or weight based dosing when appropriate to reduce radiation dose to as low as reasonably achievable. XR CHEST PORTABLE    Result Date: 4/23/2022  Exam: XR CHEST PORTABLE History: Cough Technique: AP portable view of the chest obtained. Comparison: Chest x-rays October 11, 2010. CT chest May 4, 2017 Findings: Atherosclerotic calcification of the thoracic aorta. The cardiomediastinal silhouette is within normal limits.   No pneumothorax, pleural effusion, or consolidation. Bones of the thorax appear intact. No radiographic evidence of acute intrathoracic process. MRI BRAIN WO CONTRAST    Result Date: 4/23/2022  EXAM: MRI of the brain without contrast History: Cerebral amyloid. Intracranial hemorrhage. Technique: Multiplanar multisequence MRI of the brain was performed without contrast. Comparison: CT brain performed earlier on the same date Findings: Foci of white matter signal abnormality within the supratentorial white matter are nonspecific but most compatible with chronic small vessel ischemic changes in a patient of this age. Mild generalized parenchymal volume loss. Unchanged morphology of the ventricles. No  mass, mass effect, midline shift, or abnormal extra-axial fluid collection. Midline structures are within normal limits. The posterior fossa is within normal limits. There is no diffusion restriction. Susceptibility artifact is identified within the dependent occipital horns of the right and left lateral ventricles secondary to intraventricular hemorrhage in these locations. There are a few scattered tiny foci of susceptibility artifact within the bilateral temporal lobes likely representing sequela of prior microhemorrhages or cavernomas. The major intracranial vascular flow voids are maintained. Cranial nerves 7/8 complexes appear grossly unremarkable. The visualized paranasal sinuses and bilateral mastoid air cells are clear. Small amount of hemorrhage within the occipital horns of the bilateral lateral ventricles. No change in morphology of the ventricles. Generalized parenchymal volume loss and nonspecific white matter findings most compatible with chronic small vessel ischemic changes in a patient of this age. CTA VENOUS HEAD W WO CONTRAST    Result Date: 4/23/2022  Exam: CT angiography of the head CT angiography of the neck CT venography of the head.  History: Intraventricular hemorrhage Technique: Multiple contiguous axial images were obtained from the thoracic inlet through the [place] of [place] after administration of intravenous contrast. Multiplanar reformatted images and multiplanar maximum intensity projection images were obtained,  as were postprocessed 3-D volume rendered images. Luminal narrowings are estimated using NASCET criteria. CT venography performed with contrast. Comparison: None available Findings: CTA neck: There is a three-vessel  aortic arch with normal origins of the brachiocephalic, left common carotid and left subclavian arteries. Atherosclerotic calcification of the aortic arch. The vertebral arteries originate from the subclavian arteries. No significant stenosis of the great vessel origins or the origin of the carotid or vertebral arteries. Atherosclerotic calcification of the carotid bulbs resulting in less than 50% stenosis. The bilateral common carotid arteries are of normal course and caliber. The bilateral internal and external carotid arteries are of normal course and caliber. Cervical vertebral arteries are patent. The left vertebral artery is dominant. No dissection, high grade stenosis or aneurysm. The right jugular vein courses through the posterior soft tissues of the neck, an anatomic variant. Degenerative changes of the cervical spine. CTA head: The internal carotid arteries through the skull base are patent. The bilateral middle cerebral arteries through the trifurcation and opercular branches are patent. The vertebrobasilar system including the superior cerebellar and posterior cerebral arteries are patent. The left posterior indicating artery is patent. The right posterior communicating artery is not definitively visualized. The bilateral anterior cerebral arteries and anterior communicating artery are patent. No aneurysm or high-grade stenosis of the cerebral vasculature. CT venography: No filling defect within the dural venous sinuses. CTA head: No aneurysm or high-grade stenosis. CTA neck: No aneurysm, dissection, or high-grade stenosis. CTV head: No dural venous sinus thrombosis. All CT scans at this facility use dose modulation, iterative reconstruction, and/or weight based dosing when appropriate to reduce radiation dose to as low as reasonably achievable. Discharge Medications:         Medication List      CONTINUE taking these medications    atorvastatin 40 MG tablet  Commonly known as: LIPITOR     levothyroxine 25 MCG tablet  Commonly known as: SYNTHROID     metoprolol succinate 25 MG extended release tablet  Commonly known as: TOPROL XL            Disposition:   If discharged to Home, Any [place] [id number] needs that were indicated and/or required as been addressed and set up by Social Work. Condition at discharge: good     Activity: activity as tolerated    Total time taken for discharging this patient: 40 minutes. Greater than 70% of time was spent focused exclusively on this patient. Time was taken to review chart, discuss plans with consultants, reconciling medications, discussing plan answering questions with patient.      Signed:  Kimmy Mobley MD  4/25/2022, 1:28 PM  ----------------------------------------------------------------------------------------------------------------------    Dontrell Obregon

## 2022-04-25 NOTE — PROGRESS NOTES
Patient assessment completed. She is A&Ox4. She ambulates without any devices with a steady gait. She is NSR on tele. No chest pain or SOB. Reports her previous headache is resolved.

## 2022-04-25 NOTE — PROGRESS NOTES
Neurology Follow up    SUBJECTIVE:   Patient seen and examined for neurology follow-up for her spontaneous intra ventricular hemorrhage. She is currently alert and oriented x3, no acute distress, cooperative. No focal neurodeficits. Headache is improved. Mild headache persists. Denies vision changes, dizziness, dysarthria, dysphagia. Ambulating without difficulty. Mental status intact. Patient has very subtle headache but no nausea vomiting and she is ambulatory without any focal findings. Current Facility-Administered Medications   Medication Dose Route Frequency Provider Last Rate Last Admin    acetaminophen (TYLENOL) tablet 650 mg  650 mg Oral Q4H PRN Catie Andrews MD   650 mg at 04/24/22 1509    ondansetron (ZOFRAN-ODT) disintegrating tablet 4 mg  4 mg Oral Q8H PRN Catie Andrews MD        Or    ondansetron [facility]) injection 4 mg  4 mg IntraVENous Q6H PRN Catie Andrews MD        LORazepam (ATIVAN) injection 1 mg  1 mg IntraVENous Q4H PRN Catie Andrews MD        labetalol (NORMODYNE;TRANDATE) injection 10 mg  10 mg IntraVENous Q10 Min PRN Catie Andrews MD           PHYSICAL EXAM:    BP (!) 126/57   Pulse 81   Temp 97.9 °F (36.6 °C) (Oral)   Resp 18   Ht 5' 2\" (1.575 m)   Wt 115 lb 9.6 oz (52.4 kg)   SpO2 100%   BMI 21.14 kg/m²    General Appearance:      Skin:  normal  CVS - Normal sounds, No murmurs , No carotid Bruits  RS -CTA  Abdomen Soft, BS present  Review of Systems   Constitutional: Negative for fever. HENT: Negative for hearing loss and trouble swallowing. Eyes: Negative for photophobia and visual disturbance. Respiratory: Negative for choking and shortness of breath. Cardiovascular: Negative for chest pain and palpitations. Gastrointestinal: Negative for nausea and vomiting. Musculoskeletal: Negative for back pain, gait problem, joint swelling, myalgias, neck pain and neck stiffness. Skin: Negative for color change.    Neurological: Positive for headaches. Negative for dizziness, tremors, seizures, syncope, facial asymmetry, speech difficulty, weakness, light-headedness and numbness. Psychiatric/Behavioral: Negative for behavioral problems, confusion, hallucinations and sleep disturbance. Mental Status Exam:             Level of Alertness:   awake            Orientation:   person, place, time                      Attention/Concentration:  normal            Language:  normal      Funduscopic Exam:     Cranial Nerves            Cranial nerve III           Pupils:  equal, round, reactive to light      Cranial nerves III, IV, VI           Extraocular Movements: intact      Cranial nerve V           Facial sensation:  intact      Cranial nerve VII           Facial strength: intact      Cranial nerve VIII           Hearing:  intact      Cranial nerve IX           Palate:  intact      Cranial nerve XI         Shoulder shrug:  intact      Cranial nerve XII          Tongue movement:  normal  No meningeal signs are notable.   Motor:    Drift:  absent  Motor exam is symmetrical 5 out of 5 all extremities bilaterally  Tone:  normal  Abnormal Movements:  absent            Sensory:        Pinprick             Right Upper Extremity:  normal             Left Upper Extremity:  normal             Right Lower Extremity:  normal             Left Lower Extremity:  normal           Vibration                         Touch            Proprioception                 Coordination:           Finger/Nose   Right:  normal              Left:  normal          Heel-Knee-Shin                Right:  normal              Left:  normal          Rapid Alternating Movements              Right:  normal              Left:  normal          Gait:                       Casual: Gait is deferred                      Romberg:          Reflexes:             Deep Tendon Reflexes:             Reflexes are 2 +             Plantar response:                Right:  downgoing               Left: downgoing  Nonfocal normal examination. Vascular:  Cardiac Exam:  normal         CTA HEAD W WO CONTRAST    Result Date: 4/23/2022  Exam: CT angiography of the head CT angiography of the neck CT venography of the head. History: Intraventricular hemorrhage Technique: Multiple contiguous axial images were obtained from the thoracic inlet through the [place] after administration of intravenous contrast. Multiplanar reformatted images and multiplanar maximum intensity projection images were obtained,  as were postprocessed 3-D volume rendered images. Luminal narrowings are estimated using NASCET criteria. CT venography performed with contrast. Comparison: None available Findings: CTA neck: There is a three-vessel  aortic arch with normal origins of the brachiocephalic, left common carotid and left subclavian arteries. Atherosclerotic calcification of the aortic arch. The vertebral arteries originate from the subclavian arteries. No significant stenosis of the great vessel origins or the origin of the carotid or vertebral arteries. Atherosclerotic calcification of the carotid bulbs resulting in less than 50% stenosis. The bilateral common carotid arteries are of normal course and caliber. The bilateral internal and external carotid arteries are of normal course and caliber. Cervical vertebral arteries are patent. The left vertebral artery is dominant. No dissection, high grade stenosis or aneurysm. The right jugular vein courses through the posterior soft tissues of the neck, an anatomic variant. Degenerative changes of the cervical spine. CTA head: The internal carotid arteries through the skull base are patent. The bilateral middle cerebral arteries through the trifurcation and opercular branches are patent. The vertebrobasilar system including the superior cerebellar and posterior cerebral arteries are patent. The left posterior indicating artery is patent.  The right posterior communicating artery is not definitively visualized. The bilateral anterior cerebral arteries and anterior communicating artery are patent. No aneurysm or high-grade stenosis of the cerebral vasculature. CT venography: No filling defect within the dural venous sinuses. CTA head: No aneurysm or high-grade stenosis. CTA neck: No aneurysm, dissection, or high-grade stenosis. CTV head: No dural venous sinus thrombosis. All CT scans at this facility use dose modulation, iterative reconstruction, and/or weight based dosing when appropriate to reduce radiation dose to as low as reasonably achievable. CT head without contrast    Result Date: 4/23/2022  EXAMINATION: CT of the brain without contrast HISTORY: Intracerebral hemorrhage. COMPARISON: CT brain April 23, 2022 0311 hours TECHNIQUE: Multiple contiguous axial images were obtained of the brain from the skull base through the vertex. Multiplanar reformats were obtained. FINDINGS: No significant interval change in the amount of hemorrhage within the occipital horn of the left lateral ventricle. Interval development of a small amount of hemorrhage within the bicipital groove the right lateral ventricle. Unchanged morphology of the ventricles. Mild generalized parenchymal volume loss. Areas of bilateral supratentorial white matter hypoattenuation are nonspecific but most likely related to chronic small vessel ischemic changes in a patient of this age. Gray-white matter differentiation is preserved. No mass effect or midline shift. The visualized paranasal sinuses and mastoid air cells are clear. Calvarium is intact. Interval development of a small amount of hemorrhage within the occipital horn the right lateral ventricle. No significant interval change in the amount of hemorrhage within the occipital horn of the left lateral ventricle. Unchanged morphology of the ventricles.  All CT scans at this facility use dose modulation, iterative reconstruction, and/or weight based dosing when appropriate to reduce radiation dose to as low as reasonably achievable. CT HEAD WO CONTRAST    Result Date: 4/23/2022  EXAMINATION: CT of the brain without contrast HISTORY: Headache COMPARISON: CT brain August 10, 2010 TECHNIQUE: Multiple contiguous axial images were obtained of the brain from the skull base through the vertex. Multiplanar reformats were obtained. FINDINGS: Mild generalized parenchymal volume loss. Areas of bilateral supratentorial white matter hypoattenuation are nonspecific but most likely related to chronic small vessel ischemic changes in a patient of this age. Gray-white matter differentiation is preserved. There is a tiny amount of hyperdense material within the occipital horn of the left lateral ventricle. No intraparenchymal, subarachnoid, or subdural hemorrhage. Mild prominence of the ventricular system. No mass effect or midline shift. The visualized paranasal sinuses and mastoid air cells are clear. Calvarium is intact. Small amount of intraventricular hemorrhage within the occipital horn of the left lateral ventricle. Mild prominence of the ventricles is nonspecific and may be due to generalized parenchymal volume loss however developing hydrocephalus cannot be excluded. All CT scans at this facility use dose modulation, iterative reconstruction, and/or weight based dosing when appropriate to reduce radiation dose to as low as reasonably achievable. CTA NECK W WO CONTRAST    Result Date: 4/23/2022  Exam: CT angiography of the head CT angiography of the neck CT venography of the head. History: Intraventricular hemorrhage Technique: Multiple contiguous axial images were obtained from the thoracic inlet through the [place] after administration of intravenous contrast. Multiplanar reformatted images and multiplanar maximum intensity projection images were obtained,  as were postprocessed 3-D volume rendered images. Luminal narrowings are estimated using NASCET criteria. CT venography performed with contrast. Comparison: None available Findings: CTA neck: There is a three-vessel  aortic arch with normal origins of the brachiocephalic, left common carotid and left subclavian arteries. Atherosclerotic calcification of the aortic arch. The vertebral arteries originate from the subclavian arteries. No significant stenosis of the great vessel origins or the origin of the carotid or vertebral arteries. Atherosclerotic calcification of the carotid bulbs resulting in less than 50% stenosis. The bilateral common carotid arteries are of normal course and caliber. The bilateral internal and external carotid arteries are of normal course and caliber. Cervical vertebral arteries are patent. The left vertebral artery is dominant. No dissection, high grade stenosis or aneurysm. The right jugular vein courses through the posterior soft tissues of the neck, an anatomic variant. Degenerative changes of the cervical spine. CTA head: The internal carotid arteries through the skull base are patent. The bilateral middle cerebral arteries through the trifurcation and opercular branches are patent. The vertebrobasilar system including the superior cerebellar and posterior cerebral arteries are patent. The left posterior indicating artery is patent. The right posterior communicating artery is not definitively visualized. The bilateral anterior cerebral arteries and anterior communicating artery are patent. No aneurysm or high-grade stenosis of the cerebral vasculature. CT venography: No filling defect within the dural venous sinuses. CTA head: No aneurysm or high-grade stenosis. CTA neck: No aneurysm, dissection, or high-grade stenosis. CTV head: No dural venous sinus thrombosis. All CT scans at this facility use dose modulation, iterative reconstruction, and/or weight based dosing when appropriate to reduce radiation dose to as low as reasonably achievable.      XR CHEST PORTABLE    Result Date: 4/23/2022  Exam: XR CHEST PORTABLE History: Cough Technique: AP portable view of the chest obtained. Comparison: Chest x-rays October 11, 2010. CT chest May 4, 2017 Findings: Atherosclerotic calcification of the thoracic aorta. The cardiomediastinal silhouette is within normal limits. No pneumothorax, pleural effusion, or consolidation. Bones of the thorax appear intact. No radiographic evidence of acute intrathoracic process. MRI BRAIN WO CONTRAST    Result Date: 4/23/2022  EXAM: MRI of the brain without contrast History: Cerebral amyloid. Intracranial hemorrhage. Technique: Multiplanar multisequence MRI of the brain was performed without contrast. Comparison: CT brain performed earlier on the same date Findings: Foci of white matter signal abnormality within the supratentorial white matter are nonspecific but most compatible with chronic small vessel ischemic changes in a patient of this age. Mild generalized parenchymal volume loss. Unchanged morphology of the ventricles. No  mass, mass effect, midline shift, or abnormal extra-axial fluid collection. Midline structures are within normal limits. The posterior fossa is within normal limits. There is no diffusion restriction. Susceptibility artifact is identified within the dependent occipital horns of the right and left lateral ventricles secondary to intraventricular hemorrhage in these locations. There are a few scattered tiny foci of susceptibility artifact within the bilateral temporal lobes likely representing sequela of prior microhemorrhages or cavernomas. The major intracranial vascular flow voids are maintained. Cranial nerves 7/8 complexes appear grossly unremarkable. The visualized paranasal sinuses and bilateral mastoid air cells are clear. Small amount of hemorrhage within the occipital horns of the bilateral lateral ventricles. No change in morphology of the ventricles.  Generalized parenchymal volume loss and nonspecific white matter findings most compatible with chronic small vessel ischemic changes in a patient of this age. CTA VENOUS HEAD W WO CONTRAST    Result Date: 4/23/2022  Exam: CT angiography of the head CT angiography of the neck CT venography of the head. History: Intraventricular hemorrhage Technique: Multiple contiguous axial images were obtained from the thoracic inlet through the [place] after administration of intravenous contrast. Multiplanar reformatted images and multiplanar maximum intensity projection images were obtained,  as were postprocessed 3-D volume rendered images. Luminal narrowings are estimated using NASCET criteria. CT venography performed with contrast. Comparison: None available Findings: CTA neck: There is a three-vessel  aortic arch with normal origins of the brachiocephalic, left common carotid and left subclavian arteries. Atherosclerotic calcification of the aortic arch. The vertebral arteries originate from the subclavian arteries. No significant stenosis of the great vessel origins or the origin of the carotid or vertebral arteries. Atherosclerotic calcification of the carotid bulbs resulting in less than 50% stenosis. The bilateral common carotid arteries are of normal course and caliber. The bilateral internal and external carotid arteries are of normal course and caliber. Cervical vertebral arteries are patent. The left vertebral artery is dominant. No dissection, high grade stenosis or aneurysm. The right jugular vein courses through the posterior soft tissues of the neck, an anatomic variant. Degenerative changes of the cervical spine. CTA head: The internal carotid arteries through the skull base are patent. The bilateral middle cerebral arteries through the trifurcation and opercular branches are patent. The vertebrobasilar system including the superior cerebellar and posterior cerebral arteries are patent. The left posterior indicating artery is patent.  The right posterior communicating artery is not definitively visualized. The bilateral anterior cerebral arteries and anterior communicating artery are patent. No aneurysm or high-grade stenosis of the cerebral vasculature. CT venography: No filling defect within the dural venous sinuses. CTA head: No aneurysm or high-grade stenosis. CTA neck: No aneurysm, dissection, or high-grade stenosis. CTV head: No dural venous sinus thrombosis. All CT scans at this facility use dose modulation, iterative reconstruction, and/or weight based dosing when appropriate to reduce radiation dose to as low as reasonably achievable. Recent Labs     04/23/22 0215 04/24/22  0556   WBC 7.0 5.5   HGB 13.1 11.6*    194     Recent Labs     04/23/22 0215 04/24/22  0556   * 134*   K 3.9 3.7   CL 96 102   CO2 23 21   BUN 12 9   CREATININE 0.49* 0.46*   GLUCOSE 102* 85     Recent Labs     04/23/22 0215   BILITOT 0.7   ALKPHOS 59   AST 18   ALT 16     Lab Results   Component Value Date    PROTIME 13.5 04/23/2022    INR 1.0 04/23/2022     No results found for: LITHIUM, DILFRTOT, VALPROATE    ASSESSMENT AND PLAN  For cerebral hemorrhage which appears to be a spontaneous intracerebral hemorrhage with some minor blood in the lateral horns of the lateral ventricle. There is no further increased bleeding. MRI was reviewed and Stuart images showed that she has not had any  intracerebral hemorrhage or capillary leak or amyloid. This therefore is a spontaneous intracerebral hemorrhage and may be related to antiplatelet agent aspirin which is seen 0.1% patient's without trauma. The question is going to be of future use of antiplatelet agents and in the circumstances were no other causes found this could be recurrent and can be detrimental.  Patient was on aspirin for cardiac reasons and we will consult Dr. Emi Mejia for further discussion as we may have to keep her off the aspirin and continue to monitor her other risk factors.   This consultation can be done tomorrow. Details of this are all discussed with the son and the patient and they understand.      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 F/U outpatient with Dr Zamora Forget  [place] to [place] from neuro standpoint. Follow up 3 weeks. Patient has plans for travel to [place] and possibly overseas 3 weeks from today and will need follow-up prior to that. I have personally performed a face to face diagnostic evaluation on this patient, reviewed all data and investigations, and am the sole provider of all clinical decisions on the neurological status of this patient. Patient actually is doing well and has very minimal headache. She will keep off aspirin for now and we await cardiology input on the same as well. We will discharge her today with a follow-up CT scan in a few days. She has overseas trip planned and we can see her prior to this with a CT scan at the same day. Redd Acosta MD, Priscilla Acevedo, American Board of Psychiatry & Neurology  Board Certified in Vascular Neurology  Board Certified in Neuromuscular Medicine  Certified in . [place] [id number]

## 2022-04-25 NOTE — PROGRESS NOTES
[facility] Heart Progress Note      Patient: Gladis Coleman  Unit/Bed: Y879/Z784-87  YOB: 1945  MRN: [id number]  Admit Date:  4/23/2022  HOSPITAL day #     Rounding cardiologist : Aden Contreras  Primary Cardiologist : Cuauhtemoc Garcia  Subjective Complaint:   No cardiac complaints. Physical Examination:     BP (!) 126/57   Pulse 81   Temp 97.9 °F (36.6 °C) (Oral)   Resp 18   Ht 5' 2\" (1.575 m)   Wt 115 lb 9.6 oz (52.4 kg)   SpO2 100%   BMI 21.14 kg/m²     No intake or output data in the 24 hours ending 04/25/22 0959  Weights  Wt Readings from Last 3 Encounters:   04/24/22 115 lb 9.6 oz (52.4 kg)     Patient was getting ready to be discharged. She is awake alert, speaks in full sentences, oriented x3, no headache today, moves all extremities, no palpitations or chest discomfort. LABS:   CBC:   Recent Labs     04/23/22  0215 04/24/22  0556   WBC 7.0 5.5   HGB 13.1 11.6*    194      BMP:    Recent Labs     04/23/22  0215 04/24/22  0556   * 134*   K 3.9 3.7   CL 96 102   CO2 23 21   BUN 12 9   CREATININE 0.49* 0.46*   GLUCOSE 102* 85              Troponin: No results for input(s): TROPONINT in the last 72 hours. BNP: No results for input(s): PROBNP in the last 72 hours. INR:   Recent Labs     04/23/22  0445   INR 1.0      Mg:   Recent Labs     04/23/22  0215   MG 2.1           Assessment:    1. CAD :atherosclerotic heart disease with cardiac catheterization March 2013 showing 65% stenosis of the left anterior descending artery and approximately 70% stenosis of the right coronary artery. Fractional flow reserve was 0.92 and medical therapy was recommended. She has a history of hyperlipidemia. Typically her blood pressures run on the low side. She had some vasovagal episodes in the past.  2.  Intraventricular hemorrhage, with no extension. 3.  S/p surgery for hiatal hernia and peptic ulcer disease  4. Negative stress echo March 2020 with preserved LV systolic function  5.   Patient will be advised to stop all antiplatelet therapy. I also told her to hold off on nonsteroidals, and fish oil. I told her to check with Dr. Yu Knows as to whether she should hold off on any other group of medications.       Plan:  Okay for discharge  Outpatient follow-up from cardiology perspective with Dr. Cuauhtemoc Garcia  Electronically signed by Lucina Sanchez MD on 4/25/2022 at 9:59 AM

## 2022-04-25 NOTE — PROGRESS NOTES
Progress Note  Patient: Nasim Aguilera  Unit/Bed: R350/C365-78  YOB: 1945  MRN: [id number]  Acct: [de-identified]   Admitting Diagnosis: Intraventricular hemorrhage [facility]) [I61.5]  Date:  4/23/2022  Hospital Day: 2    Chief Complaint:  Intraventricular hemorrhage     Patient has no complaints at this time. Denies headache, vision changes, lightheadedness, or dizziness. Physical Examination:    BP (!) 153/55   Pulse 73   Temp 98.4 °F (36.9 °C) (Oral)   Resp 18   Ht 5' 2\" (1.575 m)   Wt 115 lb 9.6 oz (52.4 kg)   SpO2 99%   BMI 21.14 kg/m²    Physical Exam     Patient awake, alert, able to answer questions appropriately. Seen walking about room independently without any gait abnormality.      LABS:  CBC:   Lab Results   Component Value Date    WBC 5.5 04/24/2022    RBC 3.88 04/24/2022    RBC 4.35 04/10/2012    HGB 11.6 04/24/2022    HCT 34.3 04/24/2022    MCV 88.3 04/24/2022    MCH 29.9 04/24/2022    MCHC 33.9 04/24/2022    RDW 12.7 04/24/2022     04/24/2022    MPV 9.5 04/10/2012     CBC with Differential:   Lab Results   Component Value Date    WBC 5.5 04/24/2022    RBC 3.88 04/24/2022    RBC 4.35 04/10/2012    HGB 11.6 04/24/2022    HCT 34.3 04/24/2022     04/24/2022    MCV 88.3 04/24/2022    MCH 29.9 04/24/2022    MCHC 33.9 04/24/2022    RDW 12.7 04/24/2022    LYMPHOPCT 26.3 04/23/2022    MONOPCT 6.7 04/23/2022    EOSPCT 1.8 09/27/2011    BASOPCT 0.5 04/23/2022    MONOSABS 0.5 04/23/2022    LYMPHSABS 1.8 04/23/2022    EOSABS 0.0 04/23/2022    BASOSABS 0.0 04/23/2022     CMP:    Lab Results   Component Value Date     04/24/2022    K 3.7 04/24/2022     04/24/2022    CO2 21 04/24/2022    BUN 9 04/24/2022    CREATININE 0.46 04/24/2022    GFRAA >60.0 04/24/2022    LABGLOM >60.0 04/24/2022    GLUCOSE 85 04/24/2022    GLUCOSE 100 04/10/2012    PROT 6.8 04/23/2022    LABALBU 4.1 04/23/2022    LABALBU 4.5 04/10/2012    CALCIUM 8.0 04/24/2022    BILITOT 0.7 04/23/2022 ALKPHOS 59 04/23/2022    AST 18 04/23/2022    ALT 16 04/23/2022     BMP:    Lab Results   Component Value Date     04/24/2022    K 3.7 04/24/2022     04/24/2022    CO2 21 04/24/2022    BUN 9 04/24/2022    LABALBU 4.1 04/23/2022    LABALBU 4.5 04/10/2012    CREATININE 0.46 04/24/2022    CALCIUM 8.0 04/24/2022    GFRAA >60.0 04/24/2022    LABGLOM >60.0 04/24/2022    GLUCOSE 85 04/24/2022    GLUCOSE 100 04/10/2012     Magnesium:    Lab Results   Component Value Date    MG 2.1 04/23/2022     EXAM: MRI of the brain without contrast       History: Cerebral amyloid. Intracranial hemorrhage.       Technique: Multiplanar multisequence MRI of the brain was performed without contrast.       Comparison: CT brain performed earlier on the same date       Findings:       Foci of white matter signal abnormality within the supratentorial white matter are nonspecific but most compatible with chronic small vessel ischemic changes in a patient of this age.        Mild generalized parenchymal volume loss. Unchanged morphology of the ventricles. No  mass, mass effect, midline shift, or abnormal extra-axial fluid collection.  Midline structures are within normal limits. The posterior fossa is within normal limits.        There is no diffusion restriction. Susceptibility artifact is identified within the dependent occipital horns of the right and left lateral ventricles secondary to intraventricular hemorrhage in these locations. There are a few scattered tiny foci of    susceptibility artifact within the bilateral temporal lobes likely representing sequela of prior microhemorrhages or cavernomas.        The major intracranial vascular flow voids are maintained. Cranial nerves 7/8 complexes appear grossly unremarkable.  The visualized paranasal sinuses and bilateral mastoid air cells are clear.           Impression       Small amount of hemorrhage within the occipital horns of the bilateral lateral ventricles.  No change in morphology of the ventricles.       Generalized parenchymal volume loss and nonspecific white matter findings most compatible with chronic small vessel ischemic changes in a patient of this age. EXAMINATION: CT of the brain without contrast       HISTORY: Intracerebral hemorrhage.       COMPARISON: CT brain April 23, 2022 0311 hours       TECHNIQUE: Multiple contiguous axial images were obtained of the brain from the skull base through the vertex. Multiplanar reformats were obtained.       FINDINGS:       No significant interval change in the amount of hemorrhage within the occipital horn of the left lateral ventricle. Interval development of a small amount of hemorrhage within the bicipital groove the right lateral ventricle. Unchanged morphology of the    ventricles.        Mild generalized parenchymal volume loss. Areas of bilateral supratentorial white matter hypoattenuation are nonspecific but most likely related to chronic small vessel ischemic changes in a patient of this age. Gray-white matter differentiation is    preserved.       No mass effect or midline shift.        The visualized paranasal sinuses and mastoid air cells are clear.  Calvarium is intact.           Impression       Interval development of a small amount of hemorrhage within the occipital horn the right lateral ventricle.        No significant interval change in the amount of hemorrhage within the occipital horn of the left lateral ventricle.        Unchanged morphology of the ventricles.            All CT scans at this facility use dose modulation, iterative reconstruction, and/or weight based dosing when appropriate to reduce radiation dose to as low as reasonably achievable.             Assessment:    Active Hospital Problems    Diagnosis Date Noted    Intraventricular hemorrhage, nontraumatic ([address] [I61.5] 04/23/2022     Priority: Medium    Intraventricular hemorrhage ([address] [I61.5] 04/23/2022     Priority: Medium    Other hyperlipidemia [E78.49] 04/23/2022     Priority: Medium    Beasley's esophagus with esophagitis [K22.70, K20.90] 04/23/2022     Priority: Medium     Tiny bleed in the left ventricle appears to be chronic. Patient seen and evaluated by neurology who recommends blood pressure control with maintaining systolic BP less than 171 with frequent neuro checks. Follow up MRI shows small amount of hemorrhage within the occipital horns of the bilateral ventricle. No change in morphology of the ventricles. Generalized parenchymal volume loss and nonspecific white mater findings most consistent with chronic small vessel ischemic changes. Hold ASA per neurology recommendations. Plan:  Patient not neurosurgery candidate at this time. Neurosurgery will continue to follow this patient.      Electronically signedby NISSA Reyes CNP on 4/25/2022 at 8:04 AM

## 2022-04-28 LAB
BLOOD CULTURE, ROUTINE: NORMAL
CULTURE, BLOOD 2: NORMAL

## 2022-04-28 ASSESSMENT — ENCOUNTER SYMPTOMS
SORE THROAT: 0
DIARRHEA: 1
ABDOMINAL PAIN: 0
BACK PAIN: 0
PHOTOPHOBIA: 0
NAUSEA: 0
COUGH: 0
EYE PAIN: 0
RHINORRHEA: 0
VOMITING: 0
SHORTNESS OF BREATH: 0

## 2022-05-11 ENCOUNTER — OFFICE VISIT (OUTPATIENT)
Dept: NEUROLOGY | Age: 77
End: 2022-05-11
Payer: MEDICARE

## 2022-05-11 VITALS
SYSTOLIC BLOOD PRESSURE: 124 MMHG | WEIGHT: 113 LBS | HEART RATE: 65 BPM | BODY MASS INDEX: 20.67 KG/M2 | DIASTOLIC BLOOD PRESSURE: 66 MMHG

## 2022-05-11 DIAGNOSIS — I61.5 INTRAVENTRICULAR HEMORRHAGE, NONTRAUMATIC (HCC): Primary | ICD-10-CM

## 2022-05-11 PROCEDURE — 4040F PNEUMOC VAC/ADMIN/RCVD: CPT | Performed by: PSYCHIATRY & NEUROLOGY

## 2022-05-11 PROCEDURE — 99214 OFFICE O/P EST MOD 30 MIN: CPT | Performed by: PSYCHIATRY & NEUROLOGY

## 2022-05-11 PROCEDURE — 1123F ACP DISCUSS/DSCN MKR DOCD: CPT | Performed by: PSYCHIATRY & NEUROLOGY

## 2022-05-11 PROCEDURE — 1036F TOBACCO NON-USER: CPT | Performed by: PSYCHIATRY & NEUROLOGY

## 2022-05-11 PROCEDURE — G8420 CALC BMI NORM PARAMETERS: HCPCS | Performed by: PSYCHIATRY & NEUROLOGY

## 2022-05-11 PROCEDURE — G8427 DOCREV CUR MEDS BY ELIG CLIN: HCPCS | Performed by: PSYCHIATRY & NEUROLOGY

## 2022-05-11 PROCEDURE — G8400 PT W/DXA NO RESULTS DOC: HCPCS | Performed by: PSYCHIATRY & NEUROLOGY

## 2022-05-11 PROCEDURE — 1111F DSCHRG MED/CURRENT MED MERGE: CPT | Performed by: PSYCHIATRY & NEUROLOGY

## 2022-05-11 PROCEDURE — 3017F COLORECTAL CA SCREEN DOC REV: CPT | Performed by: PSYCHIATRY & NEUROLOGY

## 2022-05-11 PROCEDURE — 1090F PRES/ABSN URINE INCON ASSESS: CPT | Performed by: PSYCHIATRY & NEUROLOGY

## 2022-05-11 RX ORDER — COLCHICINE 0.6 MG/1
0.6 TABLET ORAL DAILY
COMMUNITY
Start: 2012-04-12 | End: 2022-09-29

## 2022-05-11 RX ORDER — ATORVASTATIN CALCIUM 20 MG/1
20 TABLET, FILM COATED ORAL DAILY
COMMUNITY
Start: 2012-04-12

## 2022-05-11 RX ORDER — DILTIAZEM HYDROCHLORIDE 120 MG/1
120 CAPSULE, EXTENDED RELEASE ORAL DAILY
COMMUNITY
Start: 2012-04-12 | End: 2022-09-29

## 2022-05-11 ASSESSMENT — ENCOUNTER SYMPTOMS
PHOTOPHOBIA: 0
NAUSEA: 0
COLOR CHANGE: 0
CHOKING: 0
SHORTNESS OF BREATH: 0
TROUBLE SWALLOWING: 0
VOMITING: 0
BACK PAIN: 0

## 2022-05-11 NOTE — PROGRESS NOTES
Subjective:      Patient ID: Adarsh Livingston is a 78 y.o. female who presents today for:  Chief Complaint   Patient presents with    Follow-up     pt says no one called to schedule for ct yet for her. SHe says no issues going on since being out of hospital.        HPI 72-year-old right-handed female with history of cerebral hemorrhage. Patient was seen in the hospital  In April. Patient had a spontaneous intracerebral hemorrhage with some minor blood in the lateral horns of the lateral ventricle. This has not increased. MRI was reviewed and [place] images showed that she has not had appears history of does have a hemorrhage to consider amyloid. Patient was on aspirin and this likely was from aspirin. We were somewhat concerned about use of antiplatelet agents in this patient. She did not have any other residuals. Patient remains asymptomatic at this time. She is due to travel to [place] and therefore she is here, did not get a CT scan which we have just arranged for tomorrow at 7:30 AM.    No past medical history on file. No past surgical history on file. Social History     Socioeconomic History    Marital status: Single     Spouse name: Not on file    Number of children: Not on file    Years of education: Not on file    Highest education level: Not on file   Occupational History    Not on file   Tobacco Use    Smoking status: Never Smoker    Smokeless tobacco: Never Used   Substance and Sexual Activity    Alcohol use: Not on file    Drug use: Not on file    Sexual activity: Not on file   Other Topics Concern    Not on file   Social History Narrative    Not on file     Social Determinants of Health     Financial Resource Strain:     Difficulty of Paying Living Expenses: Not on file   Food Insecurity:     Worried About Running Out of Food in the Last Year: Not on file    [place]leen of Food in the Last Year: Not on file   Transportation Needs:     Lack of Transportation (Medical):  Not on file    Lack of Transportation (Non-Medical): Not on file   Physical Activity:     Days of Exercise per Week: Not on file    Minutes of Exercise per Session: Not on file   Stress:     Feeling of Stress : Not on file   Social Connections:     Frequency of Communication with Friends and Family: Not on file    Frequency of Social Gatherings with Friends and Family: Not on file    Attends Zoroastrian Services: Not on file    Active Member of [address] [place] [address] or Organizations: Not on file    Attends Club or Organization Meetings: Not on file    Marital Status: Not on file   Intimate Partner Violence:     Fear of Current or Ex-Partner: Not on file    Emotionally Abused: Not on file    Physically Abused: Not on file    Sexually Abused: Not on file   Housing Stability:     Unable to Pay for Housing in the Last Year: Not on file    Number of [place] in the Last Year: Not on file    Unstable Housing in the Last Year: Not on file     No family history on file. Allergies   Allergen Reactions    Hydromorphone      Other reaction(s): Mental Status Change  Hard time understanding people, and vomiting    Penicillins      Other reaction(s): Other: See Comments  convulsion       Current Outpatient Medications   Medication Sig Dispense Refill    colchicine (COLCRYS) 0.6 MG tablet Take 0.6 mg by mouth daily      dilTIAZem (TIAZAC) 120 MG extended release capsule Take 120 mg by mouth daily      atorvastatin (LIPITOR) 20 MG tablet Take 20 mg by mouth daily      levothyroxine (SYNTHROID) 25 MCG tablet Take 25 mcg by mouth Daily      metoprolol succinate (TOPROL XL) 25 MG extended release tablet Take 25 mg by mouth daily       No current facility-administered medications for this visit. Review of Systems   Constitutional: Negative for fever. HENT: Negative for ear pain, tinnitus and trouble swallowing. Eyes: Negative for photophobia and visual disturbance.    Respiratory: Negative for choking and shortness of breath. Cardiovascular: Negative for chest pain and palpitations. Gastrointestinal: Negative for nausea and vomiting. Musculoskeletal: Negative for back pain, gait problem, joint swelling, myalgias, neck pain and neck stiffness. Skin: Negative for color change. Allergic/Immunologic: Negative for food allergies. Neurological: Negative for dizziness, tremors, seizures, syncope, facial asymmetry, speech difficulty, weakness, light-headedness, numbness and headaches. Psychiatric/Behavioral: Negative for behavioral problems, confusion, hallucinations and sleep disturbance. Objective:   /66 (Site: Left Upper Arm, Position: Sitting, Cuff Size: Medium Adult)   Pulse 65   Wt 113 lb (51.3 kg)   BMI 20.67 kg/m²     Physical Exam  Vitals reviewed. Eyes:      Pupils: Pupils are equal, round, and reactive to light. Cardiovascular:      Rate and Rhythm: Normal rate and regular rhythm. Heart sounds: No murmur heard. Pulmonary:      Effort: Pulmonary effort is normal.      Breath sounds: Normal breath sounds. Abdominal:      General: Bowel sounds are normal.   Musculoskeletal:         General: Normal range of motion. Cervical back: Normal range of motion. Skin:     General: Skin is warm. Neurological:      Mental Status: She is alert and oriented to person, place, and time. Cranial Nerves: No cranial nerve deficit. Sensory: No sensory deficit. Motor: No abnormal muscle tone. Coordination: Coordination normal.      Deep Tendon Reflexes: Reflexes are normal and symmetric. Babinski sign absent on the right side. Babinski sign absent on the left side. Psychiatric:         Mood and Affect: Mood normal.         CTA HEAD W WO CONTRAST    Result Date: 4/23/2022  Exam: CT angiography of the head CT angiography of the neck CT venography of the head.  History: Intraventricular hemorrhage Technique: Multiple contiguous axial images were obtained from the thoracic inlet through the [place] of [place] after administration of intravenous contrast. Multiplanar reformatted images and multiplanar maximum intensity projection images were obtained,  as were postprocessed 3-D volume rendered images. Luminal narrowings are estimated using NASCET criteria. CT venography performed with contrast. Comparison: None available Findings: CTA neck: There is a three-vessel  aortic arch with normal origins of the brachiocephalic, left common carotid and left subclavian arteries. Atherosclerotic calcification of the aortic arch. The vertebral arteries originate from the subclavian arteries. No significant stenosis of the great vessel origins or the origin of the carotid or vertebral arteries. Atherosclerotic calcification of the carotid bulbs resulting in less than 50% stenosis. The bilateral common carotid arteries are of normal course and caliber. The bilateral internal and external carotid arteries are of normal course and caliber. Cervical vertebral arteries are patent. The left vertebral artery is dominant. No dissection, high grade stenosis or aneurysm. The right jugular vein courses through the posterior soft tissues of the neck, an anatomic variant. Degenerative changes of the cervical spine. CTA head: The internal carotid arteries through the skull base are patent. The bilateral middle cerebral arteries through the trifurcation and opercular branches are patent. The vertebrobasilar system including the superior cerebellar and posterior cerebral arteries are patent. The left posterior indicating artery is patent. The right posterior communicating artery is not definitively visualized. The bilateral anterior cerebral arteries and anterior communicating artery are patent. No aneurysm or high-grade stenosis of the cerebral vasculature. CT venography: No filling defect within the dural venous sinuses. CTA head: No aneurysm or high-grade stenosis.  CTA neck: No aneurysm, dissection, or high-grade stenosis. CTV head: No dural venous sinus thrombosis. All CT scans at this facility use dose modulation, iterative reconstruction, and/or weight based dosing when appropriate to reduce radiation dose to as low as reasonably achievable. CT head without contrast    Result Date: 4/23/2022  EXAMINATION: CT of the brain without contrast HISTORY: Intracerebral hemorrhage. COMPARISON: CT brain April 23, 2022 0311 hours TECHNIQUE: Multiple contiguous axial images were obtained of the brain from the skull base through the vertex. Multiplanar reformats were obtained. FINDINGS: No significant interval change in the amount of hemorrhage within the occipital horn of the left lateral ventricle. Interval development of a small amount of hemorrhage within the bicipital groove the right lateral ventricle. Unchanged morphology of the ventricles. Mild generalized parenchymal volume loss. Areas of bilateral supratentorial white matter hypoattenuation are nonspecific but most likely related to chronic small vessel ischemic changes in a patient of this age. Gray-white matter differentiation is preserved. No mass effect or midline shift. The visualized paranasal sinuses and mastoid air cells are clear. Calvarium is intact. Interval development of a small amount of hemorrhage within the occipital horn the right lateral ventricle. No significant interval change in the amount of hemorrhage within the occipital horn of the left lateral ventricle. Unchanged morphology of the ventricles. All CT scans at this facility use dose modulation, iterative reconstruction, and/or weight based dosing when appropriate to reduce radiation dose to as low as reasonably achievable. CT HEAD WO CONTRAST    Result Date: 4/23/2022  EXAMINATION: CT of the brain without contrast HISTORY: Headache COMPARISON: CT brain August 10, 2010 TECHNIQUE: Multiple contiguous axial images were obtained of the brain from the skull base through the vertex.  Multiplanar reformats were obtained. FINDINGS: Mild generalized parenchymal volume loss. Areas of bilateral supratentorial white matter hypoattenuation are nonspecific but most likely related to chronic small vessel ischemic changes in a patient of this age. Gray-white matter differentiation is preserved. There is a tiny amount of hyperdense material within the occipital horn of the left lateral ventricle. No intraparenchymal, subarachnoid, or subdural hemorrhage. Mild prominence of the ventricular system. No mass effect or midline shift. The visualized paranasal sinuses and mastoid air cells are clear. Calvarium is intact. Small amount of intraventricular hemorrhage within the occipital horn of the left lateral ventricle. Mild prominence of the ventricles is nonspecific and may be due to generalized parenchymal volume loss however developing hydrocephalus cannot be excluded. All CT scans at this facility use dose modulation, iterative reconstruction, and/or weight based dosing when appropriate to reduce radiation dose to as low as reasonably achievable. CTA NECK W WO CONTRAST    Result Date: 4/23/2022  Exam: CT angiography of the head CT angiography of the neck CT venography of the head. History: Intraventricular hemorrhage Technique: Multiple contiguous axial images were obtained from the thoracic inlet through the [place] of [place] after administration of intravenous contrast. Multiplanar reformatted images and multiplanar maximum intensity projection images were obtained,  as were postprocessed 3-D volume rendered images. Luminal narrowings are estimated using NASCET criteria. CT venography performed with contrast. Comparison: None available Findings: CTA neck: There is a three-vessel  aortic arch with normal origins of the brachiocephalic, left common carotid and left subclavian arteries. Atherosclerotic calcification of the aortic arch. The vertebral arteries originate from the subclavian arteries.  No significant stenosis of the great vessel origins or the origin of the carotid or vertebral arteries. Atherosclerotic calcification of the carotid bulbs resulting in less than 50% stenosis. The bilateral common carotid arteries are of normal course and caliber. The bilateral internal and external carotid arteries are of normal course and caliber. Cervical vertebral arteries are patent. The left vertebral artery is dominant. No dissection, high grade stenosis or aneurysm. The right jugular vein courses through the posterior soft tissues of the neck, an anatomic variant. Degenerative changes of the cervical spine. CTA head: The internal carotid arteries through the skull base are patent. The bilateral middle cerebral arteries through the trifurcation and opercular branches are patent. The vertebrobasilar system including the superior cerebellar and posterior cerebral arteries are patent. The left posterior indicating artery is patent. The right posterior communicating artery is not definitively visualized. The bilateral anterior cerebral arteries and anterior communicating artery are patent. No aneurysm or high-grade stenosis of the cerebral vasculature. CT venography: No filling defect within the dural venous sinuses. CTA head: No aneurysm or high-grade stenosis. CTA neck: No aneurysm, dissection, or high-grade stenosis. CTV head: No dural venous sinus thrombosis. All CT scans at this facility use dose modulation, iterative reconstruction, and/or weight based dosing when appropriate to reduce radiation dose to as low as reasonably achievable. XR CHEST PORTABLE    Result Date: 4/23/2022  Exam: XR CHEST PORTABLE History: Cough Technique: AP portable view of the chest obtained. Comparison: Chest x-rays October 11, 2010. CT chest May 4, 2017 Findings: Atherosclerotic calcification of the thoracic aorta. The cardiomediastinal silhouette is within normal limits. No pneumothorax, pleural effusion, or consolidation.   Bones of the thorax appear intact. No radiographic evidence of acute intrathoracic process. MRI BRAIN WO CONTRAST    Result Date: 4/23/2022  EXAM: MRI of the brain without contrast History: Cerebral amyloid. Intracranial hemorrhage. Technique: Multiplanar multisequence MRI of the brain was performed without contrast. Comparison: CT brain performed earlier on the same date Findings: Foci of white matter signal abnormality within the supratentorial white matter are nonspecific but most compatible with chronic small vessel ischemic changes in a patient of this age. Mild generalized parenchymal volume loss. Unchanged morphology of the ventricles. No  mass, mass effect, midline shift, or abnormal extra-axial fluid collection. Midline structures are within normal limits. The posterior fossa is within normal limits. There is no diffusion restriction. Susceptibility artifact is identified within the dependent occipital horns of the right and left lateral ventricles secondary to intraventricular hemorrhage in these locations. There are a few scattered tiny foci of susceptibility artifact within the bilateral temporal lobes likely representing sequela of prior microhemorrhages or cavernomas. The major intracranial vascular flow voids are maintained. Cranial nerves 7/8 complexes appear grossly unremarkable. The visualized paranasal sinuses and bilateral mastoid air cells are clear. Small amount of hemorrhage within the occipital horns of the bilateral lateral ventricles. No change in morphology of the ventricles. Generalized parenchymal volume loss and nonspecific white matter findings most compatible with chronic small vessel ischemic changes in a patient of this age. CTA VENOUS HEAD W WO CONTRAST    Result Date: 4/23/2022  Exam: CT angiography of the head CT angiography of the neck CT venography of the head.  History: Intraventricular hemorrhage Technique: Multiple contiguous axial images were obtained from the thoracic inlet through the [place] of [place] after administration of intravenous contrast. Multiplanar reformatted images and multiplanar maximum intensity projection images were obtained,  as were postprocessed 3-D volume rendered images. Luminal narrowings are estimated using NASCET criteria. CT venography performed with contrast. Comparison: None available Findings: CTA neck: There is a three-vessel  aortic arch with normal origins of the brachiocephalic, left common carotid and left subclavian arteries. Atherosclerotic calcification of the aortic arch. The vertebral arteries originate from the subclavian arteries. No significant stenosis of the great vessel origins or the origin of the carotid or vertebral arteries. Atherosclerotic calcification of the carotid bulbs resulting in less than 50% stenosis. The bilateral common carotid arteries are of normal course and caliber. The bilateral internal and external carotid arteries are of normal course and caliber. Cervical vertebral arteries are patent. The left vertebral artery is dominant. No dissection, high grade stenosis or aneurysm. The right jugular vein courses through the posterior soft tissues of the neck, an anatomic variant. Degenerative changes of the cervical spine. CTA head: The internal carotid arteries through the skull base are patent. The bilateral middle cerebral arteries through the trifurcation and opercular branches are patent. The vertebrobasilar system including the superior cerebellar and posterior cerebral arteries are patent. The left posterior indicating artery is patent. The right posterior communicating artery is not definitively visualized. The bilateral anterior cerebral arteries and anterior communicating artery are patent. No aneurysm or high-grade stenosis of the cerebral vasculature. CT venography: No filling defect within the dural venous sinuses. CTA head: No aneurysm or high-grade stenosis.  CTA neck: No aneurysm, dissection, or high-grade stenosis. CTV head: No dural venous sinus thrombosis. All CT scans at this facility use dose modulation, iterative reconstruction, and/or weight based dosing when appropriate to reduce radiation dose to as low as reasonably achievable. Lab Results   Component Value Date    WBC 5.5 04/24/2022    RBC 3.88 04/24/2022    RBC 4.35 04/10/2012    HGB 11.6 04/24/2022    HCT 34.3 04/24/2022    MCV 88.3 04/24/2022    MCH 29.9 04/24/2022    MCHC 33.9 04/24/2022    RDW 12.7 04/24/2022     04/24/2022    MPV 9.5 04/10/2012     Lab Results   Component Value Date     04/24/2022    K 3.7 04/24/2022     04/24/2022    CO2 21 04/24/2022    BUN 9 04/24/2022    CREATININE 0.46 04/24/2022    GFRAA >60.0 04/24/2022    LABGLOM >60.0 04/24/2022    GLUCOSE 85 04/24/2022    GLUCOSE 100 04/10/2012    PROT 6.8 04/23/2022    LABALBU 4.1 04/23/2022    LABALBU 4.5 04/10/2012    CALCIUM 8.0 04/24/2022    BILITOT 0.7 04/23/2022    ALKPHOS 59 04/23/2022    AST 18 04/23/2022    ALT 16 04/23/2022     Lab Results   Component Value Date    PROTIME 13.5 04/23/2022    INR 1.0 04/23/2022     Lab Results   Component Value Date    TSH 2.855 04/10/2012     Lab Results   Component Value Date    TRIG 51 08/19/2021    HDL 79 08/19/2021    LDLCALC 45 08/19/2021     No results found for: LABAMPH, BARBSCNU, LABBENZ, CANNAB, COCAINESCRN, LABMETH, OPIATESCREENURINE, PHENCYCLIDINESCREENURINE, PPXUR, ETOH  No results found for: LITHIUM, DILFRTOT, VALPROATE    Assessment:       Diagnosis Orders   1. Intraventricular hemorrhage, nontraumatic (HCC)     Spontaneous intracerebral hemorrhage with ventricular blood. Patient presented with symptoms suggestive of cerebrovascular disease. Patient was on aspirin at that time and this appeared to be a small capillary leak more than anything else. She was on aspirin for cardiac reasons though she does not have any stents.   We recommended no aspirin for now and will further discuss the need of this with cardiology. For now I recommended a CT scan tomorrow and if this is normal and the blood is not increased she may be able to travel to [place] for her vacation. We will let her know on Friday the results of the same. We did discuss the findings of her MRI and MRA as well as [place] images and that she has never had previous capillary leaks or suggestion of amyloid. Patient does not have any migraines to suggest our RCVS      Redd Marquez MD, Tosin Zavala, American Board of Psychiatry & Neurology  Board Certified in Vascular Neurology  Board Certified in Neuromuscular Medicine  Certified in [facility]:      No orders of the defined types were placed in this encounter. No orders of the defined types were placed in this encounter. No follow-ups on file.       Shanae Lazcano MD

## 2022-05-12 ENCOUNTER — HOSPITAL ENCOUNTER (OUTPATIENT)
Dept: CT IMAGING | Age: 77
Discharge: HOME | End: 2022-05-14
Payer: MEDICARE

## 2022-05-12 DIAGNOSIS — I61.5 INTRAVENTRICULAR HEMORRHAGE (HCC): ICD-10-CM

## 2022-05-12 PROCEDURE — 70450 CT HEAD/BRAIN W/O DYE: CPT

## 2022-09-29 ENCOUNTER — OFFICE VISIT (OUTPATIENT)
Dept: NEUROLOGY | Age: 77
End: 2022-09-29
Payer: MEDICARE

## 2022-09-29 VITALS
BODY MASS INDEX: 20.85 KG/M2 | WEIGHT: 114 LBS | HEART RATE: 66 BPM | SYSTOLIC BLOOD PRESSURE: 120 MMHG | DIASTOLIC BLOOD PRESSURE: 62 MMHG

## 2022-09-29 DIAGNOSIS — I61.5 INTRAVENTRICULAR HEMORRHAGE (HCC): Primary | ICD-10-CM

## 2022-09-29 DIAGNOSIS — I61.5 INTRAVENTRICULAR HEMORRHAGE, NONTRAUMATIC (HCC): ICD-10-CM

## 2022-09-29 PROCEDURE — 99214 OFFICE O/P EST MOD 30 MIN: CPT | Performed by: PSYCHIATRY & NEUROLOGY

## 2022-09-29 PROCEDURE — 1036F TOBACCO NON-USER: CPT | Performed by: PSYCHIATRY & NEUROLOGY

## 2022-09-29 PROCEDURE — G8420 CALC BMI NORM PARAMETERS: HCPCS | Performed by: PSYCHIATRY & NEUROLOGY

## 2022-09-29 PROCEDURE — 1090F PRES/ABSN URINE INCON ASSESS: CPT | Performed by: PSYCHIATRY & NEUROLOGY

## 2022-09-29 PROCEDURE — 1123F ACP DISCUSS/DSCN MKR DOCD: CPT | Performed by: PSYCHIATRY & NEUROLOGY

## 2022-09-29 PROCEDURE — G8427 DOCREV CUR MEDS BY ELIG CLIN: HCPCS | Performed by: PSYCHIATRY & NEUROLOGY

## 2022-09-29 PROCEDURE — G8400 PT W/DXA NO RESULTS DOC: HCPCS | Performed by: PSYCHIATRY & NEUROLOGY

## 2022-09-29 PROCEDURE — 3017F COLORECTAL CA SCREEN DOC REV: CPT | Performed by: PSYCHIATRY & NEUROLOGY

## 2022-09-29 RX ORDER — BENZONATATE 200 MG/1
CAPSULE ORAL
COMMUNITY
Start: 2022-09-26

## 2022-09-29 ASSESSMENT — ENCOUNTER SYMPTOMS
NAUSEA: 0
BACK PAIN: 0
TROUBLE SWALLOWING: 0
SHORTNESS OF BREATH: 0
VOMITING: 0
CHOKING: 0
PHOTOPHOBIA: 0
COLOR CHANGE: 0

## 2022-09-29 NOTE — PROGRESS NOTES
Subjective:      Patient ID: Arnold Diaz is a 78 y.o. female who presents today for:  Chief Complaint   Patient presents with    Follow-up     Pt states that she is doing okay. She has pain that starts at the middle part of the right side of the head and goes down. She states that it is not occurring right now. HPI 78 right-handed female with a history of intraventricular hemorrhage. Since last seen we repeated her CT scan shows resolution of the intraventricular hemorrhage. There is no hydrocephalus either. CT scan was done prior to her going to [facility]. She is doing quite well except she occasionally has a headache when she coughs she has a subtle headache which does not stay very long. She is not any other symptoms. She had a very good trip to [facility]. No past medical history on file. No past surgical history on file. Social History     Socioeconomic History    Marital status: Single     Spouse name: Not on file    Number of children: Not on file    Years of education: Not on file    Highest education level: Not on file   Occupational History    Not on file   Tobacco Use    Smoking status: Never    Smokeless tobacco: Never   Substance and Sexual Activity    Alcohol use: Not on file    Drug use: Not on file    Sexual activity: Not on file   Other Topics Concern    Not on file   Social History Narrative    Not on file     Social Determinants of Health     Financial Resource Strain: Not on file   Food Insecurity: Not on file   Transportation Needs: Not on file   Physical Activity: Not on file   Stress: Not on file   Social Connections: Not on file   Intimate Partner Violence: Not on file   Housing Stability: Not on file     No family history on file. Allergies   Allergen Reactions    Hydromorphone      Other reaction(s): Mental Status Change  Hard time understanding people, and vomiting    Penicillins      Other reaction(s):  Other: See Comments  convulsion       Current Outpatient Medications   Medication Sig Dispense Refill    benzonatate (TESSALON) 200 MG capsule TAKE 1 CAPSULE BY MOUTH THREE TIMES A DAY AS NEEDED      atorvastatin (LIPITOR) 20 MG tablet Take 20 mg by mouth daily      levothyroxine (SYNTHROID) 25 MCG tablet Take 25 mcg by mouth Daily      metoprolol succinate (TOPROL XL) 25 MG extended release tablet Take 25 mg by mouth daily       No current facility-administered medications for this visit. Review of Systems   Constitutional:  Negative for fever. HENT:  Negative for ear pain, tinnitus and trouble swallowing. Eyes:  Negative for photophobia and visual disturbance. Respiratory:  Negative for choking and shortness of breath. Cardiovascular:  Negative for chest pain and palpitations. Gastrointestinal:  Negative for nausea and vomiting. Musculoskeletal:  Negative for back pain, gait problem, joint swelling, myalgias, neck pain and neck stiffness. Skin:  Negative for color change. Allergic/Immunologic: Negative for food allergies. Neurological:  Negative for dizziness, tremors, seizures, syncope, facial asymmetry, speech difficulty, weakness, light-headedness, numbness and headaches. Psychiatric/Behavioral:  Negative for behavioral problems, confusion, hallucinations and sleep disturbance. Objective:   /62 (Site: Left Upper Arm, Position: Sitting, Cuff Size: Medium Adult)   Pulse 66   Wt 114 lb (51.7 kg)   BMI 20.85 kg/m²     Physical Exam  Vitals reviewed. Eyes:      Pupils: Pupils are equal, round, and reactive to light. Cardiovascular:      Rate and Rhythm: Normal rate and regular rhythm. Heart sounds: No murmur heard. Pulmonary:      Effort: Pulmonary effort is normal.      Breath sounds: Normal breath sounds. Abdominal:      General: Bowel sounds are normal.   Musculoskeletal:         General: Normal range of motion. Cervical back: Normal range of motion. Skin:     General: Skin is warm.    Neurological: Mental Status: She is alert and oriented to person, place, and time. Cranial Nerves: No cranial nerve deficit. Sensory: No sensory deficit. Motor: No abnormal muscle tone. Coordination: Coordination normal.      Deep Tendon Reflexes: Reflexes are normal and symmetric. Babinski sign absent on the right side. Babinski sign absent on the left side. Psychiatric:         Mood and Affect: Mood normal.       CT HEAD WO CONTRAST    Result Date: 5/12/2022  CT HEAD WO CONTRAST : 5/12/2022 CLINICAL HISTORY: I61.5 Intraventricular hemorrhage ([address] ICD10. COMPARISON: 4/23/2022. TECHNIQUE: Spiral unenhanced images were obtained of the head, with routine multiplanar reconstructions performed. All CT scans at this facility use dose modulation, iterative reconstruction, and/or weight based dosing when appropriate to reduce radiation dose to as low as reasonably achievable. FINDINGS: There is no significant residual intracranial hemorrhage, mass effect, midline shift, extra-axial collection, evidence of hydrocephalus, recent ischemic infarct, or skull fracture identified. There is no significant volume loss or white matter changes, for age. The mastoid air cells and visualized paranasal sinuses are essentially clear. RESOLVED SMALL VOLUME INTRAVENTRICULAR HEMORRHAGE FROM 4/23/2022. NO OTHER SIGNIFICANT CHANGES IDENTIFIED.        Lab Results   Component Value Date/Time    WBC 5.5 04/24/2022 05:56 AM    RBC 3.88 04/24/2022 05:56 AM    RBC 4.35 04/10/2012 11:26 AM    HGB 11.6 04/24/2022 05:56 AM    HCT 34.3 04/24/2022 05:56 AM    MCV 88.3 04/24/2022 05:56 AM    MCH 29.9 04/24/2022 05:56 AM    MCHC 33.9 04/24/2022 05:56 AM    RDW 12.7 04/24/2022 05:56 AM     04/24/2022 05:56 AM    MPV 9.5 04/10/2012 11:26 AM     Lab Results   Component Value Date/Time     04/24/2022 05:56 AM    K 3.7 04/24/2022 05:56 AM     04/24/2022 05:56 AM    CO2 21 04/24/2022 05:56 AM    BUN 9 04/24/2022 05:56 AM CREATININE 0.46 04/24/2022 05:56 AM    GFRAA >60.0 04/24/2022 05:56 AM    LABGLOM >60.0 04/24/2022 05:56 AM    GLUCOSE 85 04/24/2022 05:56 AM    GLUCOSE 100 04/10/2012 11:26 AM    PROT 6.8 04/23/2022 02:15 AM    LABALBU 4.1 04/23/2022 02:15 AM    LABALBU 4.5 04/10/2012 11:26 AM    CALCIUM 8.0 04/24/2022 05:56 AM    BILITOT 0.7 04/23/2022 02:15 AM    ALKPHOS 59 04/23/2022 02:15 AM    AST 18 04/23/2022 02:15 AM    ALT 16 04/23/2022 02:15 AM     Lab Results   Component Value Date/Time    PROTIME 13.5 04/23/2022 04:45 AM    INR 1.0 04/23/2022 04:45 AM     Lab Results   Component Value Date/Time    TSH 2.855 04/10/2012 11:26 AM     Lab Results   Component Value Date/Time    TRIG 51 08/19/2021 09:58 AM    HDL 79 08/19/2021 09:58 AM    LDLCALC 45 08/19/2021 09:58 AM     No results found for: LABAMPH, BARBSCNU, LABBENZ, CANNAB, COCAINESCRN, LABMETH, OPIATESCREENURINE, PHENCYCLIDINESCREENURINE, PPXUR, ETOH  No results found for: LITHIUM, DILFRTOT, VALPROATE    Assessment:       Diagnosis Orders   1. Intraventricular hemorrhage ([address]        2. Intraventricular hemorrhage, nontraumatic (HCC)        Intracerebral hemorrhage which was intraventricular. Small capillary leak. She does not have this residuals of the same. Repeat CT scan did not show anything in the bladder resolved. She then had a trip to [place] everything went well she is not dizzy. She does not have migraine headaches to suggest our [facility].  This may be a minor capillary leak. We did not find any AVM or any other findings. We though we will keep an eye and if she has any other headaches I recommend that she was not wait but come to the emergency room right away as this was a idiopathic leak which we are not quite sure why it occurred there is no session of amyloid. Reassured her that the headache that she is having now is external as she has pain when she touches her scalp.       Plan:      No orders of the defined types were placed in this encounter. No orders of the defined types were placed in this encounter. No follow-ups on file.       Dane Montaño MD

## 2023-02-04 PROBLEM — K29.70 GASTROESOPHAGITIS: Status: ACTIVE | Noted: 2023-02-04

## 2023-02-04 PROBLEM — I49.1 PAC (PREMATURE ATRIAL CONTRACTION): Status: ACTIVE | Noted: 2023-02-04

## 2023-02-04 PROBLEM — R42 DIZZINESS: Status: ACTIVE | Noted: 2023-02-04

## 2023-02-04 PROBLEM — I25.10 ATHEROSCLEROSIS OF NATIVE CORONARY ARTERY WITHOUT ANGINA PECTORIS: Status: ACTIVE | Noted: 2023-02-04

## 2023-02-04 PROBLEM — M85.80 OSTEOPENIA: Status: ACTIVE | Noted: 2023-02-04

## 2023-02-04 PROBLEM — H18.413 ARCUS SENILIS, BILATERAL: Status: ACTIVE | Noted: 2023-02-04

## 2023-02-04 PROBLEM — E28.39 OVARIAN FAILURE: Status: ACTIVE | Noted: 2023-02-04

## 2023-02-04 PROBLEM — R06.02 SOB (SHORTNESS OF BREATH) ON EXERTION: Status: ACTIVE | Noted: 2023-02-04

## 2023-02-04 PROBLEM — I47.10 PAROXYSMAL SVT (SUPRAVENTRICULAR TACHYCARDIA) (CMS-HCC): Status: ACTIVE | Noted: 2023-02-04

## 2023-02-04 PROBLEM — E78.5 HLD (HYPERLIPIDEMIA): Status: ACTIVE | Noted: 2023-02-04

## 2023-02-04 PROBLEM — E55.9 VITAMIN D DEFICIENCY: Status: ACTIVE | Noted: 2023-02-04

## 2023-02-04 PROBLEM — K21.9 LARYNGOPHARYNGEAL REFLUX (LPR): Status: ACTIVE | Noted: 2023-02-04

## 2023-02-04 PROBLEM — K59.00 FECAL RETENTION: Status: ACTIVE | Noted: 2023-02-04

## 2023-02-04 PROBLEM — R19.7 ACUTE DIARRHEA: Status: ACTIVE | Noted: 2023-02-04

## 2023-02-04 PROBLEM — H17.12 CENTRAL CORNEAL OPACITY OF LEFT EYE: Status: ACTIVE | Noted: 2023-02-04

## 2023-02-04 PROBLEM — R10.33 PERIUMBILICAL ABDOMINAL PAIN: Status: ACTIVE | Noted: 2023-02-04

## 2023-02-04 PROBLEM — R55 VASOVAGAL NEAR SYNCOPE: Status: ACTIVE | Noted: 2023-02-04

## 2023-02-04 PROBLEM — R70.0 ELEVATED ERYTHROCYTE SEDIMENTATION RATE: Status: ACTIVE | Noted: 2023-02-04

## 2023-02-04 PROBLEM — G44.209 TENSION TYPE HEADACHE: Status: ACTIVE | Noted: 2023-02-04

## 2023-02-04 PROBLEM — R53.1 WEAKNESS: Status: ACTIVE | Noted: 2023-02-04

## 2023-02-04 PROBLEM — R49.0 DYSPHONIA: Status: ACTIVE | Noted: 2023-02-04

## 2023-02-04 PROBLEM — K20.90 GASTROESOPHAGITIS: Status: ACTIVE | Noted: 2023-02-04

## 2023-02-04 PROBLEM — H35.3131 EARLY STAGE NONEXUDATIVE AGE-RELATED MACULAR DEGENERATION OF BOTH EYES: Status: ACTIVE | Noted: 2023-02-04

## 2023-02-04 PROBLEM — R07.89 ATYPICAL CHEST PAIN: Status: ACTIVE | Noted: 2023-02-04

## 2023-02-04 PROBLEM — H26.493 PCO (POSTERIOR CAPSULAR OPACIFICATION), BILATERAL: Status: ACTIVE | Noted: 2023-02-04

## 2023-02-04 PROBLEM — E03.9 HYPOTHYROIDISM: Status: ACTIVE | Noted: 2023-02-04

## 2023-02-04 PROBLEM — R92.30 DENSE BREASTS: Status: ACTIVE | Noted: 2023-02-04

## 2023-02-04 PROBLEM — K21.9 GERD (GASTROESOPHAGEAL REFLUX DISEASE): Status: ACTIVE | Noted: 2023-02-04

## 2023-02-04 PROBLEM — R13.10 DYSPHAGIA: Status: ACTIVE | Noted: 2023-02-04

## 2023-02-04 PROBLEM — I25.10 CAD (CORONARY ARTERY DISEASE): Status: ACTIVE | Noted: 2023-02-04

## 2023-02-04 PROBLEM — K44.9 HIATAL HERNIA WITH GERD: Status: ACTIVE | Noted: 2023-02-04

## 2023-02-04 PROBLEM — M16.12 PRIMARY OSTEOARTHRITIS OF LEFT HIP: Status: ACTIVE | Noted: 2023-02-04

## 2023-02-04 PROBLEM — R94.30 ABNORMAL RESULTS OF CARDIOVASCULAR FUNCTION STUDIES: Status: ACTIVE | Noted: 2023-02-04

## 2023-02-04 PROBLEM — R63.4 WEIGHT LOSS: Status: ACTIVE | Noted: 2023-02-04

## 2023-02-04 PROBLEM — I49.3 PVC (PREMATURE VENTRICULAR CONTRACTION): Status: ACTIVE | Noted: 2023-02-04

## 2023-02-04 PROBLEM — R49.0 HOARSE VOICE QUALITY: Status: ACTIVE | Noted: 2023-02-04

## 2023-02-04 PROBLEM — R05.3 CHRONIC COUGH: Status: ACTIVE | Noted: 2023-02-04

## 2023-02-04 PROBLEM — K22.70 BARRETT'S ESOPHAGUS WITHOUT DYSPLASIA: Status: ACTIVE | Noted: 2023-02-04

## 2023-02-04 PROBLEM — K21.9 HIATAL HERNIA WITH GERD: Status: ACTIVE | Noted: 2023-02-04

## 2023-02-04 PROBLEM — M79.605 PAIN OF LEFT LOWER EXTREMITY: Status: ACTIVE | Noted: 2023-02-04

## 2023-02-04 PROBLEM — R53.83 FATIGUE: Status: ACTIVE | Noted: 2023-02-04

## 2023-02-04 PROBLEM — I62.9 INTRACRANIAL HEMORRHAGE (MULTI): Status: ACTIVE | Noted: 2023-02-04

## 2023-02-04 PROBLEM — Z96.1 BILATERAL PSEUDOPHAKIA: Status: ACTIVE | Noted: 2023-02-04

## 2023-02-04 PROBLEM — Z98.890 S/P BILATERAL BLEPHAROPLASTY: Status: ACTIVE | Noted: 2023-02-04

## 2023-02-04 PROBLEM — R63.6 UNDERWEIGHT: Status: ACTIVE | Noted: 2023-02-04

## 2023-02-04 PROBLEM — H43.813 PVD (POSTERIOR VITREOUS DETACHMENT), BOTH EYES: Status: ACTIVE | Noted: 2023-02-04

## 2023-02-04 PROBLEM — K31.A0 INTESTINAL METAPLASIA OF GASTRIC MUCOSA: Status: ACTIVE | Noted: 2023-02-04

## 2023-02-04 PROBLEM — I10 HTN (HYPERTENSION): Status: ACTIVE | Noted: 2023-02-04

## 2023-02-04 PROBLEM — R00.0 SINUS TACHYCARDIA: Status: ACTIVE | Noted: 2023-02-04

## 2023-02-04 PROBLEM — J20.9 ACUTE BRONCHITIS: Status: ACTIVE | Noted: 2023-02-04

## 2023-02-04 PROBLEM — H04.203 EXCESSIVE TEAR PRODUCTION OF BOTH LACRIMAL GLANDS: Status: ACTIVE | Noted: 2023-02-04

## 2023-02-04 PROBLEM — R42 LIGHTHEADEDNESS: Status: ACTIVE | Noted: 2023-02-04

## 2023-02-04 PROBLEM — R51.9 HEADACHE: Status: ACTIVE | Noted: 2023-02-04

## 2023-02-04 PROBLEM — H35.373 EPIRETINAL MEMBRANE (ERM) OF BOTH EYES: Status: ACTIVE | Noted: 2023-02-04

## 2023-02-04 PROBLEM — R92.2 DENSE BREASTS: Status: ACTIVE | Noted: 2023-02-04

## 2023-02-04 PROBLEM — I31.9 PERICARDITIS (HHS-HCC): Status: ACTIVE | Noted: 2023-02-04

## 2023-02-04 PROBLEM — R05.1 ACUTE COUGH: Status: ACTIVE | Noted: 2023-02-04

## 2023-02-04 PROBLEM — M79.669 PAIN, LOWER LEG: Status: ACTIVE | Noted: 2023-02-04

## 2023-02-04 PROBLEM — K29.70 GASTRITIS: Status: ACTIVE | Noted: 2023-02-04

## 2023-02-04 PROBLEM — G44.209 HEADACHE, TENSION-TYPE: Status: ACTIVE | Noted: 2023-02-04

## 2023-02-04 RX ORDER — PANTOPRAZOLE SODIUM 20 MG/1
20 TABLET, DELAYED RELEASE ORAL DAILY
COMMUNITY

## 2023-02-04 RX ORDER — LEVOTHYROXINE SODIUM 25 UG/1
25 TABLET ORAL DAILY
COMMUNITY
End: 2023-05-02 | Stop reason: SDUPTHER

## 2023-02-04 RX ORDER — METOPROLOL SUCCINATE 25 MG/1
25 TABLET, EXTENDED RELEASE ORAL DAILY
COMMUNITY
Start: 2021-09-21

## 2023-02-04 RX ORDER — DENOSUMAB 60 MG/ML
60 INJECTION SUBCUTANEOUS
COMMUNITY
Start: 2021-09-22

## 2023-02-04 RX ORDER — NITROGLYCERIN 0.4 MG/1
0.4 TABLET SUBLINGUAL EVERY 5 MIN PRN
COMMUNITY

## 2023-02-04 RX ORDER — ATORVASTATIN CALCIUM 40 MG/1
40 TABLET, FILM COATED ORAL NIGHTLY
COMMUNITY
Start: 2021-12-13

## 2023-02-13 LAB
ALBUMIN: 3.9 G/DL (ref 3.4–5)
ALP BLD-CCNC: 42 U/L (ref 33–136)
ALT SERPL-CCNC: 21 U/L (ref 7–45)
ANION GAP SERPL CALCULATED.3IONS-SCNC: 11 MMOL/L (ref 10–20)
AST SERPL-CCNC: 23 U/L (ref 9–39)
BICARBONATE: 30 MMOL/L (ref 21–32)
BILIRUB SERPL-MCNC: 0.9 MG/DL (ref 0–1.2)
CALCIUM SERPL-MCNC: 9 MG/DL (ref 8.6–10.3)
CHLORIDE BLD-SCNC: 103 MMOL/L (ref 98–107)
CHOLESTEROL/HDL RATIO: 2
CHOLESTEROL: 135 MG/DL (ref 0–199)
CREAT SERPL-MCNC: 0.64 MG/DL (ref 0.5–1)
EGFR FEMALE: >90 ML/MIN/1.73M2
ERYTHROCYTE [DISTWIDTH] IN BLOOD BY AUTOMATED COUNT: 12.7 % (ref 11.5–14)
GLUCOSE: 94 MG/DL (ref 74–99)
HCT VFR BLD CALC: 39 % (ref 36–46)
HDLC SERPL-MCNC: 68.5 MG/DL
HEMOGLOBIN: 12.7 G/DL (ref 12–16)
LDL CHOLESTEROL: 56 MG/DL (ref 0–99)
MCHC RBC AUTO-ENTMCNC: 32.6 G/DL (ref 32–36)
MCV RBC AUTO: 90 FL (ref 80–100)
PLATELET # BLD: 207 X10E9/L (ref 150–450)
POTASSIUM SERPL-SCNC: 4.2 MMOL/L (ref 3.5–5.3)
RBC # BLD: 4.35 X10E12/L (ref 4–5.2)
SODIUM BLD-SCNC: 140 MMOL/L (ref 136–145)
TOTAL PROTEIN: 6.7 G/DL (ref 6.4–8.2)
TRIGL SERPL-MCNC: 54 MG/DL (ref 0–149)
UREA NITROGEN: 13 MG/DL (ref 6–23)
VLDLC SERPL CALC-MCNC: 11 MG/DL (ref 0–40)
WBC: 5.9 X10E9/L (ref 4.4–11.3)

## 2023-03-27 ENCOUNTER — APPOINTMENT (OUTPATIENT)
Dept: PRIMARY CARE | Facility: CLINIC | Age: 78
End: 2023-03-27
Payer: COMMERCIAL

## 2023-05-01 RX ORDER — PREDNISONE 20 MG/1
20 TABLET ORAL DAILY
COMMUNITY
Start: 2022-10-04 | End: 2023-05-02 | Stop reason: WASHOUT

## 2023-05-01 RX ORDER — BENZONATATE 200 MG/1
1 CAPSULE ORAL
COMMUNITY
Start: 2022-09-26 | End: 2023-05-02 | Stop reason: WASHOUT

## 2023-05-01 RX ORDER — AZITHROMYCIN 250 MG/1
250 TABLET, FILM COATED ORAL DAILY
COMMUNITY
Start: 2022-10-04 | End: 2023-05-02 | Stop reason: WASHOUT

## 2023-05-01 RX ORDER — HYDROCODONE BITARTRATE AND HOMATROPINE METHYLBROMIDE ORAL SOLUTION 5; 1.5 MG/5ML; MG/5ML
5 LIQUID ORAL EVERY 6 HOURS PRN
COMMUNITY
Start: 2022-10-04 | End: 2023-05-02 | Stop reason: WASHOUT

## 2023-05-02 ENCOUNTER — OFFICE VISIT (OUTPATIENT)
Dept: PRIMARY CARE | Facility: CLINIC | Age: 78
End: 2023-05-02
Payer: COMMERCIAL

## 2023-05-02 VITALS
OXYGEN SATURATION: 98 % | HEIGHT: 62 IN | WEIGHT: 117.1 LBS | BODY MASS INDEX: 21.55 KG/M2 | HEART RATE: 67 BPM | SYSTOLIC BLOOD PRESSURE: 134 MMHG | DIASTOLIC BLOOD PRESSURE: 74 MMHG

## 2023-05-02 DIAGNOSIS — E55.9 VITAMIN D DEFICIENCY: ICD-10-CM

## 2023-05-02 DIAGNOSIS — I10 PRIMARY HYPERTENSION: ICD-10-CM

## 2023-05-02 DIAGNOSIS — I25.10 CORONARY ARTERY DISEASE INVOLVING NATIVE CORONARY ARTERY OF NATIVE HEART, UNSPECIFIED WHETHER ANGINA PRESENT: ICD-10-CM

## 2023-05-02 DIAGNOSIS — R00.0 SINUS TACHYCARDIA: ICD-10-CM

## 2023-05-02 DIAGNOSIS — M85.80 OSTEOPENIA, UNSPECIFIED LOCATION: ICD-10-CM

## 2023-05-02 DIAGNOSIS — E03.9 HYPOTHYROIDISM, UNSPECIFIED TYPE: ICD-10-CM

## 2023-05-02 DIAGNOSIS — Z78.0 POSTMENOPAUSAL: ICD-10-CM

## 2023-05-02 DIAGNOSIS — M16.12 PRIMARY OSTEOARTHRITIS OF LEFT HIP: ICD-10-CM

## 2023-05-02 DIAGNOSIS — I47.10 PAROXYSMAL SVT (SUPRAVENTRICULAR TACHYCARDIA) (CMS-HCC): ICD-10-CM

## 2023-05-02 DIAGNOSIS — K21.00 GASTROESOPHAGEAL REFLUX DISEASE WITH ESOPHAGITIS WITHOUT HEMORRHAGE: ICD-10-CM

## 2023-05-02 DIAGNOSIS — Z00.00 WELL ADULT EXAM: ICD-10-CM

## 2023-05-02 DIAGNOSIS — E78.2 MIXED HYPERLIPIDEMIA: ICD-10-CM

## 2023-05-02 DIAGNOSIS — Z00.00 MEDICARE ANNUAL WELLNESS VISIT, SUBSEQUENT: Primary | ICD-10-CM

## 2023-05-02 DIAGNOSIS — Z12.31 ENCOUNTER FOR SCREENING MAMMOGRAM FOR MALIGNANT NEOPLASM OF BREAST: ICD-10-CM

## 2023-05-02 DIAGNOSIS — K22.70 BARRETT'S ESOPHAGUS WITHOUT DYSPLASIA: ICD-10-CM

## 2023-05-02 PROBLEM — I61.5 INTRAVENTRICULAR HEMORRHAGE (MULTI): Status: RESOLVED | Noted: 2022-04-23 | Resolved: 2023-05-02

## 2023-05-02 PROBLEM — R63.4 WEIGHT LOSS: Status: RESOLVED | Noted: 2023-02-04 | Resolved: 2023-05-02

## 2023-05-02 PROBLEM — R05.3 CHRONIC COUGH: Status: RESOLVED | Noted: 2023-02-04 | Resolved: 2023-05-02

## 2023-05-02 PROBLEM — R53.1 WEAKNESS: Status: RESOLVED | Noted: 2023-02-04 | Resolved: 2023-05-02

## 2023-05-02 PROBLEM — K20.90 GASTROESOPHAGITIS: Status: RESOLVED | Noted: 2023-02-04 | Resolved: 2023-05-02

## 2023-05-02 PROBLEM — I31.9 PERICARDITIS (HHS-HCC): Status: RESOLVED | Noted: 2023-02-04 | Resolved: 2023-05-02

## 2023-05-02 PROBLEM — R07.89 ATYPICAL CHEST PAIN: Status: RESOLVED | Noted: 2023-02-04 | Resolved: 2023-05-02

## 2023-05-02 PROBLEM — K29.70 GASTROESOPHAGITIS: Status: RESOLVED | Noted: 2023-02-04 | Resolved: 2023-05-02

## 2023-05-02 PROBLEM — J20.9 ACUTE BRONCHITIS: Status: RESOLVED | Noted: 2023-02-04 | Resolved: 2023-05-02

## 2023-05-02 PROBLEM — M79.669 PAIN, LOWER LEG: Status: RESOLVED | Noted: 2023-02-04 | Resolved: 2023-05-02

## 2023-05-02 PROBLEM — R42 DIZZINESS: Status: RESOLVED | Noted: 2023-02-04 | Resolved: 2023-05-02

## 2023-05-02 PROBLEM — R42 LIGHTHEADEDNESS: Status: RESOLVED | Noted: 2023-02-04 | Resolved: 2023-05-02

## 2023-05-02 PROBLEM — R19.7 ACUTE DIARRHEA: Status: RESOLVED | Noted: 2023-02-04 | Resolved: 2023-05-02

## 2023-05-02 PROBLEM — Z98.890 S/P BILATERAL BLEPHAROPLASTY: Status: RESOLVED | Noted: 2023-02-04 | Resolved: 2023-05-02

## 2023-05-02 PROBLEM — M79.605 PAIN OF LEFT LOWER EXTREMITY: Status: RESOLVED | Noted: 2023-02-04 | Resolved: 2023-05-02

## 2023-05-02 PROBLEM — R05.1 ACUTE COUGH: Status: RESOLVED | Noted: 2023-02-04 | Resolved: 2023-05-02

## 2023-05-02 PROBLEM — I61.5 INTRAVENTRICULAR HEMORRHAGE (MULTI): Status: ACTIVE | Noted: 2022-04-23

## 2023-05-02 PROBLEM — I62.9 INTRACRANIAL HEMORRHAGE (MULTI): Status: RESOLVED | Noted: 2023-02-04 | Resolved: 2023-05-02

## 2023-05-02 PROBLEM — R53.83 FATIGUE: Status: RESOLVED | Noted: 2023-02-04 | Resolved: 2023-05-02

## 2023-05-02 PROBLEM — R51.9 HEADACHE: Status: RESOLVED | Noted: 2023-02-04 | Resolved: 2023-05-02

## 2023-05-02 PROBLEM — R63.6 UNDERWEIGHT: Status: RESOLVED | Noted: 2023-02-04 | Resolved: 2023-05-02

## 2023-05-02 PROCEDURE — 1159F MED LIST DOCD IN RCRD: CPT | Performed by: FAMILY MEDICINE

## 2023-05-02 PROCEDURE — 3075F SYST BP GE 130 - 139MM HG: CPT | Performed by: FAMILY MEDICINE

## 2023-05-02 PROCEDURE — 1170F FXNL STATUS ASSESSED: CPT | Performed by: FAMILY MEDICINE

## 2023-05-02 PROCEDURE — 99397 PER PM REEVAL EST PAT 65+ YR: CPT | Performed by: FAMILY MEDICINE

## 2023-05-02 PROCEDURE — 99214 OFFICE O/P EST MOD 30 MIN: CPT | Performed by: FAMILY MEDICINE

## 2023-05-02 PROCEDURE — G0439 PPPS, SUBSEQ VISIT: HCPCS | Performed by: FAMILY MEDICINE

## 2023-05-02 PROCEDURE — 1036F TOBACCO NON-USER: CPT | Performed by: FAMILY MEDICINE

## 2023-05-02 PROCEDURE — 3078F DIAST BP <80 MM HG: CPT | Performed by: FAMILY MEDICINE

## 2023-05-02 PROCEDURE — 1160F RVW MEDS BY RX/DR IN RCRD: CPT | Performed by: FAMILY MEDICINE

## 2023-05-02 RX ORDER — LEVOTHYROXINE SODIUM 25 UG/1
25 TABLET ORAL DAILY
Qty: 90 TABLET | Refills: 0 | Status: SHIPPED | OUTPATIENT
Start: 2023-05-02 | End: 2023-08-02 | Stop reason: SDUPTHER

## 2023-05-02 ASSESSMENT — ACTIVITIES OF DAILY LIVING (ADL)
DRESSING: INDEPENDENT
DRESSING: NEEDS ASSISTANCE
MANAGING_FINANCES: INDEPENDENT
BATHING: INDEPENDENT
TAKING_MEDICATION: INDEPENDENT
BATHING: INDEPENDENT
DOING_HOUSEWORK: INDEPENDENT
GROCERY_SHOPPING: INDEPENDENT

## 2023-05-02 ASSESSMENT — PATIENT HEALTH QUESTIONNAIRE - PHQ9
1. LITTLE INTEREST OR PLEASURE IN DOING THINGS: NOT AT ALL
SUM OF ALL RESPONSES TO PHQ9 QUESTIONS 1 AND 2: 0
2. FEELING DOWN, DEPRESSED OR HOPELESS: NOT AT ALL

## 2023-05-02 NOTE — PROGRESS NOTES
Subjective   Reason for Visit: Hedy Pete is an 77 y.o. female here for a  Medicare Wellness visit and follow up for monitoring and management of multiple medical conditions.      HPI    Had stress test (2/2023) ordered by cardiology since her last visit here.        Patient has hypertension.  Pt does not monitor her BP at home.  She denies any CP, SOB, and LE edema.  Patient is compliant with antihypertensive therapy and denies any noted side effects.      Patient has hyperlipidemia.  Patient tries to limit the amount of fatty foods and high cholesterol foods that are consumed.  She is compliant with her atorvastatin. She denies any noted side effects.     Pt has known CAD.  Pt follows with her cardiologist (Dr. Pollard) on a regular basis.   She denies any chest pain or shortness of breath with or without exertional activity.      Patient has hypothyroidism.   She has been compliant with the dosing of her levothyroxine.     She has vitamin D deficiency.  Pt has been compliant with the dosing of her over-the-counter vitamin D supplementation.      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 Stallworth.  She is not on any acid suppressing medications at this time.     Pt has OA of the left hip.  She continues to have some hip pain but she has continued to delay hip replacement which was recommended by orthopedics.   She mostly notes symptoms of LEFT hip pain and restricted motion when she flexes the knee and externally rotates the hip such as putting on socks or shoes.     She has osteopenia.  A FRAX score reveals the need for treatment and she has been on Prolia.  She has tolerated the medication well.   12/5/2022: SHE IS DUE FOR A PROLIA INJECTION.     As previously noted:   Pt had intracranial hemorrhage (left intraventricular hemorrhage) and was hospitalized for 3-4 days at [facility] 4/2022. The bleeding was thought to be a result of her  "antiplatelet therapy.  She was treated by Dr. Canada (neurology).  She has followed with neurology (Dr. Canada) as directed.      Patient Care Team:  Donis Noriega DO as PCP - General  Donis Noriega DO as [facility]  Donis Noriega DO as PCP - [facility] [employer] PCP     Review of Systems  Constitutional: Patient denies any fever, chills, loss of appetite, or unexplained weight loss.  Cardiovascular: Patient denies any chest pain, shortness of breath with exertion, tachycardia, palpitations, orthopnea, or paroxysmal nocturnal dyspnea.  Respiratory: Patient denies any cough, shortness breath, or wheezing.  Gastrointestinal patient denies any nausea, vomiting, diarrhea, constipation, melena, hematochezia, or reflux symptoms  Skin: Denies any rashes or skin lesions   Neurology: Patient denies any new motor or sensory losses. Denies any numbness, tingling, weakness, and incoordination of the extremities. Patient also denies any tremor, seizures, or gait instability.  Endocrinology: Denies any polyuria, polydipsia, polyphagia, or heat/cold intolerance    SEE HPI ALSO.    OTHERWISE ROS IS NEGATIVE TODAY.    Objective   Vitals:  /74   Pulse 67   Ht 1.575 m (5' 2\")   Wt 53.1 kg (117 lb 1.6 oz)   SpO2 98%   BMI 21.42 kg/m²       Physical Exam  Gen. appearance: Alert and cooperative, in no acute distress, well-developed, well-nourished elderly female.  Neck: Supple and without adenopathy or rigidity. There is no JVD at 90° and no carotid bruits are noted. There is no thyromegaly, thyroid tenderness, or palpable thyroid nodules.  Heart: Regular rate and rhythm without murmur or ectopy.  Lungs: Lungs are clear to auscultation bilaterally with good air exchange.  Skin: Good turgor, moist, warm and without rashes or lesions.  Extremities: No clubbing, cyanosis, or edema   Neurology: Alert and oriented X3, no tremor, normal gait.      Head: Normocephalic and atraumatic.  ENT: Mucous " membranes are moist, external auditory canals and tympanic membranes are within normal limits bilaterally.  Pharynx is without erythema or exudate.  There is no noted rhinorrhea.  Lymph:  No noted cervical, clavicular, axillary, or inguinal adenopathy.  Abdomen: Soft, nontender/nondistended.  No masses, guarding, rebound, or rigidity.  Bowel sounds are normal.  No abdominal bruits.  No CVA tenderness.  There is no widening of the aortic pulsation.  Psychiatric: Appropriate mood and affect, good insight and judgment, no delusions or thought disorders, no suicidal or homicidal ideation    Assessment/Plan     MEDICARE ANNUAL WELLNESS EXAM / ANNUAL PHYSICAL: Appropriate screenings for the patient's current age were discussed at length.  I encouraged a low-fat/low-cholesterol diet and routine exercise.     Hypertension: Blood pressure appears adequately controlled and we will continue with the current antihypertensive therapy.    Hyperlipidemia: Stable.   We will continue with the current dose of atorvastatin. Dietary changes, exercise, and maintenance of a healthy weight were discussed at length.     Coronary artery disease: Stable based on symptoms.   She is without any worrisome signs or symptoms for unstable angina. Risk factor modification was discussed. She will continue to follow up with cardiology as directed.   Had stress test 3/2/2023:    IMPRESSION: Normal exercise Myoview cardiac perfusion stress test.   No evidence of ischemia or myocardial infarction by perfusion imaging.   Normal left ventricular systolic function, ejection fraction 55%.   No exercise provoked significant ischemic ECG changes or chest pain symptoms.   Noninvasive risk stratification-low risk.   Compared to previous exercise myocardial perfusion stress test April 19, 2013 there is no significant interval change. The previous study report similar findings.     SVT: Stable based on symptoms.  She is currently on metoprolol and has been  managed by cardiology.  Pt has not experienced any palpitations or tachycardia.    Hypothyroidism: Stable.  We will continue current dose of levothyroxine.  We will continue to monitor.    GERD/ Villalpando's esophagus / Hiatal hernia: She underwent a revision of Nissen wrap previously.  5/2/2023: Pt reports that she is to undergo EGD by Dr. Stallworth for some persistent epigastric pain    Vitamin D Deficiency:   12/5/2022: Pt to start 2000 units per day of vitamin D supplementation.     OA of left hip: She has persistent but stable pain.   Orthopedics has recommended she undergo a total replacement but she prefers to delay surgery.  She states her symptoms have greatly improved.    Osteopenia: She does meet criteria for treatment based on her FRAX score.   Pt is treated with Prolia.  12/5/2022: PROLIA administered today.   Next PROLIA due June 5, 2023.     Paroxysmal SVT:  Stable based on symptoms.  She will continue to follow with cardiology as well.    Mammogram OVERDUE 4/6/2023 (Ordered 5/2/2023)  DEXA OVERDUE 03/2023 (Ordered 5/2/2023)

## 2023-05-02 NOTE — PATIENT INSTRUCTIONS
Follow up in 3 months with labs to be done PRIOR.    It was a pleasure to see you today. Thank you for choosing us for your health care needs.    If you have lab or other testing completed and have not been informed of results within one week, please call the office for your results.    If you haven't done so, consider signing up for [facility], the [facility] personal health record. Ask the staff how you can get started.

## 2023-06-05 ENCOUNTER — APPOINTMENT (OUTPATIENT)
Dept: PRIMARY CARE | Facility: CLINIC | Age: 78
End: 2023-06-05
Payer: COMMERCIAL

## 2023-06-21 ENCOUNTER — TELEPHONE (OUTPATIENT)
Dept: PRIMARY CARE | Facility: CLINIC | Age: 78
End: 2023-06-21
Payer: COMMERCIAL

## 2023-06-21 NOTE — TELEPHONE ENCOUNTER
----- Message from Carolynn Mcclure PA-C sent at 6/21/2023  1:11 PM EDT -----  Please call the patient regarding her mammo and DEXA.  The mammo is NL  Dexa shows thinning bones but not osteoporosis. How long has she been on Prolia??

## 2023-07-17 ENCOUNTER — TELEPHONE (OUTPATIENT)
Dept: NEUROLOGY | Age: 78
End: 2023-07-17

## 2023-07-17 NOTE — TELEPHONE ENCOUNTER
Patient called the office requesting sooner appointment. Advised that Dr. Tutu Sherwood was out of office, she states that she is having dizziness, and symptoms that are \"hard to explain\" and had hx of aneurysm. Patient advised to go to ER for evaluation.

## 2023-07-27 ENCOUNTER — LAB (OUTPATIENT)
Dept: LAB | Facility: LAB | Age: 78
End: 2023-07-27
Payer: COMMERCIAL

## 2023-07-27 DIAGNOSIS — E78.2 MIXED HYPERLIPIDEMIA: ICD-10-CM

## 2023-07-27 DIAGNOSIS — E55.9 VITAMIN D DEFICIENCY: ICD-10-CM

## 2023-07-27 DIAGNOSIS — E03.9 HYPOTHYROIDISM, UNSPECIFIED TYPE: ICD-10-CM

## 2023-07-27 DIAGNOSIS — I10 PRIMARY HYPERTENSION: ICD-10-CM

## 2023-07-27 LAB
ALANINE AMINOTRANSFERASE (SGPT) (U/L) IN SER/PLAS: 18 U/L (ref 7–45)
ALBUMIN (G/DL) IN SER/PLAS: 4 G/DL (ref 3.4–5)
ALKALINE PHOSPHATASE (U/L) IN SER/PLAS: 47 U/L (ref 33–136)
ANION GAP IN SER/PLAS: 10 MMOL/L (ref 10–20)
ASPARTATE AMINOTRANSFERASE (SGOT) (U/L) IN SER/PLAS: 22 U/L (ref 9–39)
BILIRUBIN TOTAL (MG/DL) IN SER/PLAS: 1.1 MG/DL (ref 0–1.2)
CALCIDIOL (25 OH VITAMIN D3) (NG/ML) IN SER/PLAS: 19 NG/ML
CALCIUM (MG/DL) IN SER/PLAS: 9.2 MG/DL (ref 8.6–10.3)
CARBON DIOXIDE, TOTAL (MMOL/L) IN SER/PLAS: 29 MMOL/L (ref 21–32)
CHLORIDE (MMOL/L) IN SER/PLAS: 100 MMOL/L (ref 98–107)
CHOLESTEROL (MG/DL) IN SER/PLAS: 117 MG/DL (ref 0–199)
CHOLESTEROL IN HDL (MG/DL) IN SER/PLAS: 62.9 MG/DL
CHOLESTEROL/HDL RATIO: 1.9
CREATININE (MG/DL) IN SER/PLAS: 0.67 MG/DL (ref 0.5–1.05)
GFR FEMALE: >90 ML/MIN/1.73M2
GLUCOSE (MG/DL) IN SER/PLAS: 93 MG/DL (ref 74–99)
LDL: 43 MG/DL (ref 0–99)
POTASSIUM (MMOL/L) IN SER/PLAS: 4.1 MMOL/L (ref 3.5–5.3)
PROTEIN TOTAL: 6.6 G/DL (ref 6.4–8.2)
SODIUM (MMOL/L) IN SER/PLAS: 135 MMOL/L (ref 136–145)
THYROTROPIN (MIU/L) IN SER/PLAS BY DETECTION LIMIT <= 0.05 MIU/L: 2.64 MIU/L (ref 0.44–3.98)
TRIGLYCERIDE (MG/DL) IN SER/PLAS: 56 MG/DL (ref 0–149)
UREA NITROGEN (MG/DL) IN SER/PLAS: 12 MG/DL (ref 6–23)
VLDL: 11 MG/DL (ref 0–40)

## 2023-07-27 PROCEDURE — 80053 COMPREHEN METABOLIC PANEL: CPT

## 2023-07-27 PROCEDURE — 80061 LIPID PANEL: CPT

## 2023-07-27 PROCEDURE — 84443 ASSAY THYROID STIM HORMONE: CPT

## 2023-07-27 PROCEDURE — 36415 COLL VENOUS BLD VENIPUNCTURE: CPT

## 2023-07-27 PROCEDURE — 82306 VITAMIN D 25 HYDROXY: CPT

## 2023-08-02 ENCOUNTER — OFFICE VISIT (OUTPATIENT)
Dept: PRIMARY CARE | Facility: CLINIC | Age: 78
End: 2023-08-02
Payer: COMMERCIAL

## 2023-08-02 VITALS
DIASTOLIC BLOOD PRESSURE: 60 MMHG | BODY MASS INDEX: 21.62 KG/M2 | WEIGHT: 117.5 LBS | HEIGHT: 62 IN | SYSTOLIC BLOOD PRESSURE: 138 MMHG | TEMPERATURE: 97.7 F | OXYGEN SATURATION: 98 % | HEART RATE: 54 BPM

## 2023-08-02 DIAGNOSIS — K21.00 GASTROESOPHAGEAL REFLUX DISEASE WITH ESOPHAGITIS WITHOUT HEMORRHAGE: ICD-10-CM

## 2023-08-02 DIAGNOSIS — I10 PRIMARY HYPERTENSION: Primary | ICD-10-CM

## 2023-08-02 DIAGNOSIS — I47.10 PAROXYSMAL SVT (SUPRAVENTRICULAR TACHYCARDIA) (CMS-HCC): ICD-10-CM

## 2023-08-02 DIAGNOSIS — K22.70 BARRETT'S ESOPHAGUS WITHOUT DYSPLASIA: ICD-10-CM

## 2023-08-02 DIAGNOSIS — E55.9 VITAMIN D DEFICIENCY: ICD-10-CM

## 2023-08-02 DIAGNOSIS — M16.12 PRIMARY OSTEOARTHRITIS OF LEFT HIP: ICD-10-CM

## 2023-08-02 DIAGNOSIS — E03.9 HYPOTHYROIDISM, UNSPECIFIED TYPE: ICD-10-CM

## 2023-08-02 DIAGNOSIS — I25.10 CORONARY ARTERY DISEASE INVOLVING NATIVE CORONARY ARTERY OF NATIVE HEART, UNSPECIFIED WHETHER ANGINA PRESENT: ICD-10-CM

## 2023-08-02 DIAGNOSIS — E78.2 MIXED HYPERLIPIDEMIA: ICD-10-CM

## 2023-08-02 DIAGNOSIS — M85.80 OSTEOPENIA, UNSPECIFIED LOCATION: ICD-10-CM

## 2023-08-02 PROCEDURE — 1160F RVW MEDS BY RX/DR IN RCRD: CPT | Performed by: FAMILY MEDICINE

## 2023-08-02 PROCEDURE — 1036F TOBACCO NON-USER: CPT | Performed by: FAMILY MEDICINE

## 2023-08-02 PROCEDURE — 1159F MED LIST DOCD IN RCRD: CPT | Performed by: FAMILY MEDICINE

## 2023-08-02 PROCEDURE — 3078F DIAST BP <80 MM HG: CPT | Performed by: FAMILY MEDICINE

## 2023-08-02 PROCEDURE — 3075F SYST BP GE 130 - 139MM HG: CPT | Performed by: FAMILY MEDICINE

## 2023-08-02 PROCEDURE — 99214 OFFICE O/P EST MOD 30 MIN: CPT | Performed by: FAMILY MEDICINE

## 2023-08-02 RX ORDER — LEVOTHYROXINE SODIUM 25 UG/1
25 TABLET ORAL DAILY
Qty: 90 TABLET | Refills: 0 | Status: SHIPPED | OUTPATIENT
Start: 2023-08-02 | End: 2023-11-02 | Stop reason: SDUPTHER

## 2023-08-02 ASSESSMENT — PATIENT HEALTH QUESTIONNAIRE - PHQ9
2. FEELING DOWN, DEPRESSED OR HOPELESS: NOT AT ALL
SUM OF ALL RESPONSES TO PHQ9 QUESTIONS 1 AND 2: 0
1. LITTLE INTEREST OR PLEASURE IN DOING THINGS: NOT AT ALL

## 2023-08-02 NOTE — PROGRESS NOTES
Subjective   Patient ID: Hedy Pete is a 77 y.o. female who presents for Follow-up.    HPI     [facility]AD LABS 7/27/2023      Patient has hypertension.  Pt does not monitor her BP at home.  She denies any CP, SOB, and LE edema.  Patient is compliant with antihypertensive therapy and denies any noted side effects.      Patient has hyperlipidemia.  Patient tries to limit the amount of fatty foods and high cholesterol foods that are consumed.  She is compliant with her atorvastatin. She denies any noted side effects.     Pt has known CAD.  Pt follows with her cardiologist (Dr. Pollard) on a regular basis.   She denies any chest pain or shortness of breath with or without exertional activity.      Patient has hypothyroidism.   She has been compliant with the dosing of her levothyroxine.     She has vitamin D deficiency.  Pt has been compliant with the dosing of her over-the-counter vitamin D supplementation.      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 Stallworth.  She is not on any acid suppressing medications at this time.     Pt has OA of the left hip.  She continues to have some hip pain but she has continued to delay hip replacement which was recommended by orthopedics.   She mostly notes symptoms of LEFT hip pain and restricted motion when she flexes the knee and externally rotates the hip such as putting on socks or shoes.     She has osteopenia.  A FRAX score reveals the need for treatment and she has been on Prolia.  She has tolerated the medication well.   12/5/2022: SHE IS DUE FOR A PROLIA INJECTION.     As previously noted:   Pt had intracranial hemorrhage (left intraventricular hemorrhage) and was hospitalized for 3-4 days at [facility] 4/2022. The bleeding was thought to be a result of her antiplatelet therapy.  She was treated by Dr. Canada (neurology).  She has followed with neurology (Dr. Canada) as directed      Review of  "Systems  Constitutional: Patient denies any fever, chills, loss of appetite, or unexplained weight loss.  Cardiovascular: Patient denies any chest pain, shortness of breath with exertion, tachycardia, palpitations, orthopnea, or paroxysmal nocturnal dyspnea.  Respiratory: Patient denies any cough, shortness breath, or wheezing.  Gastrointestinal: Patient denies any nausea, vomiting, diarrhea, constipation, melena, hematochezia, or reflux symptoms.  Skin: Denies any rashes or skin lesions.   Neurology: Patient denies any new motor or sensory losses.  Denies any numbness, tingling, weakness, and incoordination of the extremities.  Patient also denies any tremor, seizures, or gait instability.  Endocrinology: Denies any polyuria, polydipsia, polyphagia, or heat/cold intolerance.      Objective   /72   Pulse 54   Temp 36.5 °C (97.7 °F)   Ht 1.575 m (5' 2\")   Wt 53.3 kg (117 lb 8 oz)   SpO2 98%   BMI 21.49 kg/m²     Physical Exam  General Appearance: Alert and cooperative, in no acute distress, well-developed/well-nourished.  Neck: Supple and without adenopathy or rigidity.  There is no JVD at 90° and no carotid bruits are noted.  There is no thyromegaly, thyroid tenderness, or palpable thyroid nodules.  Heart: Regular rate and rhythm without ectopy.  Respiratory: Lungs are clear to auscultation bilaterally with good air exchange.  Good respiratory effort and no accessory muscle use.  Skin: Good turgor, moist, warm and without rashes or lesions.  Neurological exam: Alert and oriented ×3, no tremor, normal gait.  Extremities: No clubbing, cyanosis, or edema        Assessment/Plan   Hypertension: Blood pressure appears adequately controlled and we will continue with the current antihypertensive therapy.     Hyperlipidemia: Stable.   We will continue with the current dose of atorvastatin. Dietary changes, exercise, and maintenance of a healthy weight were discussed at length.      Coronary artery disease: Stable " based on symptoms.   She is without any worrisome signs or symptoms for unstable angina. Risk factor modification was discussed. She will continue to follow up with cardiology as directed.   Had normal stress test 3/2/2023.     Paroxysmal SVT: Stable based on symptoms.  She is currently on metoprolol and has been managed by cardiology.  Pt has not experienced any palpitations or tachycardia.     Hypothyroidism: Stable on last labs.  We will continue current dose of levothyroxine.  We will continue to monitor.     GERD/ Villalpando's esophagus / Hiatal hernia: She underwent a revision of Nissen wrap previously.  5/2/2023: Pt reports that she is to undergo EGD by Dr. Stallworth for some persistent epigastric pain     Vitamin D Deficiency:   8/1/2023:  Her Vit D is low on her 7/27/2023 labs.  Pt is to restart Vitamin D AFTER clearing with neurology as she thinks she was told it would thin the blood and she has a hx of a brain aneurysm.  She will see Dr. Canada in AUGUST.     OA of left hip: She has persistent but stable pain.   Orthopedics has recommended she undergo a total replacement but she prefers to delay surgery.  She states her symptoms have greatly improved.     Osteopenia: She does meet criteria for treatment based on her FRAX score.   Pt is treated with Prolia.  12/5/2022: PROLIA administered.  Next PROLIA due June 5, 2023.   8/2/2023:  Pt cancelled her Prolia injection on 6/5/2023.  We have not been able to get an approval today, so we will contact her if able to get it approved.   VIT D LEVEL NEEDS TO BE CORRECTED.         Mammogram DUE 6/22/2024  DEXA DUE 6/22/2025   MCAW due 5/2024

## 2023-08-02 NOTE — PATIENT INSTRUCTIONS
Please check with neurology before restarting the vitamin D    Follow up in 3 months.    It was a pleasure to see you today. Thank you for choosing us for your health care needs.    If you have lab or other testing completed and have not been informed of results within one week, please call the office for your results.    If you haven't done so, consider signing up for [facility], the [facility] personal health record. Ask the staff how you can get started.

## 2023-08-08 ENCOUNTER — TELEPHONE (OUTPATIENT)
Dept: PRIMARY CARE | Facility: CLINIC | Age: 78
End: 2023-08-08

## 2023-08-21 LAB
ALANINE AMINOTRANSFERASE (SGPT) (U/L) IN SER/PLAS: 20 U/L (ref 7–45)
ALBUMIN (G/DL) IN SER/PLAS: 4.1 G/DL (ref 3.4–5)
ALBUMIN: 4.1 G/DL (ref 3.4–5)
ALKALINE PHOSPHATASE (U/L) IN SER/PLAS: 50 U/L (ref 33–136)
ALP BLD-CCNC: 50 U/L (ref 33–136)
ALT SERPL-CCNC: 20 U/L (ref 7–45)
ANION GAP IN SER/PLAS: 11 MMOL/L (ref 10–20)
ANION GAP SERPL CALCULATED.3IONS-SCNC: 11 MMOL/L (ref 10–20)
ASPARTATE AMINOTRANSFERASE (SGOT) (U/L) IN SER/PLAS: 22 U/L (ref 9–39)
AST SERPL-CCNC: 22 U/L (ref 9–39)
BICARBONATE: 28 MMOL/L (ref 21–32)
BILIRUB SERPL-MCNC: 0.8 MG/DL (ref 0–1.2)
BILIRUBIN TOTAL (MG/DL) IN SER/PLAS: 0.8 MG/DL (ref 0–1.2)
CALCIUM (MG/DL) IN SER/PLAS: 9.3 MG/DL (ref 8.6–10.3)
CALCIUM SERPL-MCNC: 9.3 MG/DL (ref 8.6–10.3)
CARBON DIOXIDE, TOTAL (MMOL/L) IN SER/PLAS: 28 MMOL/L (ref 21–32)
CHLORIDE (MMOL/L) IN SER/PLAS: 102 MMOL/L (ref 98–107)
CHLORIDE BLD-SCNC: 102 MMOL/L (ref 98–107)
CHOLESTEROL (MG/DL) IN SER/PLAS: 147 MG/DL (ref 0–199)
CHOLESTEROL IN HDL (MG/DL) IN SER/PLAS: 71 MG/DL
CHOLESTEROL/HDL RATIO: 2.1
CHOLESTEROL/HDL RATIO: 2.1
CHOLESTEROL: 147 MG/DL (ref 0–199)
CREAT SERPL-MCNC: 0.66 MG/DL (ref 0.5–1.05)
CREATININE (MG/DL) IN SER/PLAS: 0.66 MG/DL (ref 0.5–1.05)
EGFR FEMALE: >90 ML/MIN/1.73M2
ERYTHROCYTE DISTRIBUTION WIDTH (RATIO) BY AUTOMATED COUNT: 12.8 % (ref 11.5–14.5)
ERYTHROCYTE MEAN CORPUSCULAR HEMOGLOBIN CONCENTRATION (G/DL) BY AUTOMATED: 32.7 G/DL (ref 32–36)
ERYTHROCYTE MEAN CORPUSCULAR VOLUME (FL) BY AUTOMATED COUNT: 90 FL (ref 80–100)
ERYTHROCYTE [DISTWIDTH] IN BLOOD BY AUTOMATED COUNT: 12.8 % (ref 11.5–14.5)
ERYTHROCYTES (10*6/UL) IN BLOOD BY AUTOMATED COUNT: 4.4 X10E12/L (ref 4–5.2)
GFR FEMALE: >90 ML/MIN/1.73M2
GLUCOSE (MG/DL) IN SER/PLAS: 97 MG/DL (ref 74–99)
GLUCOSE: 97 MG/DL (ref 74–99)
HCT VFR BLD CALC: 39.7 % (ref 36–46)
HDLC SERPL-MCNC: 71 MG/DL
HEMATOCRIT (%) IN BLOOD BY AUTOMATED COUNT: 39.7 % (ref 36–46)
HEMOGLOBIN (G/DL) IN BLOOD: 13 G/DL (ref 12–16)
HEMOGLOBIN: 13 G/DL (ref 12–16)
LDL CHOLESTEROL: 63 MG/DL (ref 0–99)
LDL: 63 MG/DL (ref 0–99)
LEUKOCYTES (10*3/UL) IN BLOOD BY AUTOMATED COUNT: 6 X10E9/L (ref 4.4–11.3)
MCHC RBC AUTO-ENTMCNC: 32.7 G/DL (ref 32–36)
MCV RBC AUTO: 90 FL (ref 80–100)
PLATELET # BLD: 220 X10E9/L (ref 150–450)
PLATELETS (10*3/UL) IN BLOOD AUTOMATED COUNT: 220 X10E9/L (ref 150–450)
POTASSIUM (MMOL/L) IN SER/PLAS: 4 MMOL/L (ref 3.5–5.3)
POTASSIUM SERPL-SCNC: 4 MMOL/L (ref 3.5–5.3)
PROTEIN TOTAL: 6.6 G/DL (ref 6.4–8.2)
RBC # BLD: 4.4 X10E12/L (ref 4–5.2)
SODIUM (MMOL/L) IN SER/PLAS: 137 MMOL/L (ref 136–145)
SODIUM BLD-SCNC: 137 MMOL/L (ref 136–145)
TOTAL PROTEIN: 6.6 G/DL (ref 6.4–8.2)
TRIGL SERPL-MCNC: 63 MG/DL (ref 0–149)
TRIGLYCERIDE (MG/DL) IN SER/PLAS: 63 MG/DL (ref 0–149)
UREA NITROGEN (MG/DL) IN SER/PLAS: 14 MG/DL (ref 6–23)
UREA NITROGEN: 14 MG/DL (ref 6–23)
VLDL: 13 MG/DL (ref 0–40)
VLDLC SERPL CALC-MCNC: 13 MG/DL (ref 0–40)
WBC: 6 X10E9/L (ref 4.4–11.3)

## 2023-09-20 ENCOUNTER — HOSPITAL ENCOUNTER (OUTPATIENT)
Dept: DATA CONVERSION | Facility: HOSPITAL | Age: 78
End: 2023-09-20
Attending: SURGERY | Admitting: SURGERY
Payer: COMMERCIAL

## 2023-09-20 DIAGNOSIS — R13.10 DYSPHAGIA, UNSPECIFIED: ICD-10-CM

## 2023-09-20 DIAGNOSIS — Z98.890 OTHER SPECIFIED POSTPROCEDURAL STATES: ICD-10-CM

## 2023-09-20 DIAGNOSIS — R12 HEARTBURN: ICD-10-CM

## 2023-09-20 DIAGNOSIS — Z88.0 ALLERGY STATUS TO PENICILLIN: ICD-10-CM

## 2023-09-20 LAB
ATRIAL RATE: 73 BPM
P AXIS: 85 DEGREES
P OFFSET: 196 MS
P ONSET: 143 MS
PR INTERVAL: 154 MS
Q ONSET: 220 MS
QRS COUNT: 12 BEATS
QRS DURATION: 78 MS
QT INTERVAL: 384 MS
QTC CALCULATION(BAZETT): 423 MS
QTC FREDERICIA: 410 MS
R AXIS: 39 DEGREES
T AXIS: 31 DEGREES
T OFFSET: 412 MS
VENTRICULAR RATE: 73 BPM

## 2023-09-26 ENCOUNTER — TELEPHONE (OUTPATIENT)
Dept: PRIMARY CARE | Facility: CLINIC | Age: 78
End: 2023-09-26
Payer: COMMERCIAL

## 2023-09-28 ENCOUNTER — OFFICE VISIT (OUTPATIENT)
Dept: NEUROLOGY | Age: 78
End: 2023-09-28
Payer: COMMERCIAL

## 2023-09-28 VITALS
DIASTOLIC BLOOD PRESSURE: 60 MMHG | HEART RATE: 55 BPM | WEIGHT: 116.2 LBS | SYSTOLIC BLOOD PRESSURE: 100 MMHG | BODY MASS INDEX: 21.25 KG/M2

## 2023-09-28 DIAGNOSIS — I61.5 INTRAVENTRICULAR HEMORRHAGE (HCC): Primary | ICD-10-CM

## 2023-09-28 DIAGNOSIS — R26.89 BALANCE DISORDER: ICD-10-CM

## 2023-09-28 DIAGNOSIS — I61.5 INTRAVENTRICULAR HEMORRHAGE, NONTRAUMATIC (HCC): ICD-10-CM

## 2023-09-28 DIAGNOSIS — G44.201 ACUTE INTRACTABLE TENSION-TYPE HEADACHE: ICD-10-CM

## 2023-09-28 PROBLEM — H17.12 CENTRAL CORNEAL OPACITY OF LEFT EYE: Status: ACTIVE | Noted: 2023-02-04

## 2023-09-28 PROBLEM — R94.30 ABNORMAL RESULT OF CARDIOVASCULAR FUNCTION STUDY: Status: ACTIVE | Noted: 2023-02-04

## 2023-09-28 PROBLEM — H43.813 PVD (POSTERIOR VITREOUS DETACHMENT), BOTH EYES: Status: ACTIVE | Noted: 2023-02-04

## 2023-09-28 PROBLEM — I10 HTN (HYPERTENSION): Status: ACTIVE | Noted: 2023-02-04

## 2023-09-28 PROBLEM — E55.9 VITAMIN D DEFICIENCY: Status: ACTIVE | Noted: 2023-02-04

## 2023-09-28 PROBLEM — H04.203 EXCESSIVE TEAR PRODUCTION OF BOTH LACRIMAL GLANDS: Status: ACTIVE | Noted: 2023-02-04

## 2023-09-28 PROBLEM — R10.33 PERIUMBILICAL ABDOMINAL PAIN: Status: ACTIVE | Noted: 2023-02-04

## 2023-09-28 PROBLEM — R06.02 SOB (SHORTNESS OF BREATH) ON EXERTION: Status: ACTIVE | Noted: 2023-02-04

## 2023-09-28 PROBLEM — R00.0 SINUS TACHYCARDIA: Status: ACTIVE | Noted: 2023-02-04

## 2023-09-28 PROBLEM — H18.413 ARCUS SENILIS, BILATERAL: Status: ACTIVE | Noted: 2023-02-04

## 2023-09-28 PROBLEM — K59.00 FECAL RETENTION: Status: ACTIVE | Noted: 2023-02-04

## 2023-09-28 PROBLEM — K21.9 LARYNGOPHARYNGEAL REFLUX (LPR): Status: ACTIVE | Noted: 2023-02-04

## 2023-09-28 PROBLEM — R92.2 DENSE BREASTS: Status: ACTIVE | Noted: 2023-02-04

## 2023-09-28 PROBLEM — M16.12 PRIMARY OSTEOARTHRITIS OF LEFT HIP: Status: ACTIVE | Noted: 2023-02-04

## 2023-09-28 PROBLEM — H35.3131 EARLY STAGE NONEXUDATIVE AGE-RELATED MACULAR DEGENERATION OF BOTH EYES: Status: ACTIVE | Noted: 2023-02-04

## 2023-09-28 PROBLEM — R92.30 DENSE BREASTS: Status: ACTIVE | Noted: 2023-02-04

## 2023-09-28 PROBLEM — E28.39 OVARIAN FAILURE: Status: ACTIVE | Noted: 2023-02-04

## 2023-09-28 PROBLEM — I47.10 PAROXYSMAL SVT (SUPRAVENTRICULAR TACHYCARDIA): Status: ACTIVE | Noted: 2023-02-04

## 2023-09-28 PROBLEM — K31.A0 INTESTINAL METAPLASIA OF GASTRIC MUCOSA: Status: ACTIVE | Noted: 2023-02-04

## 2023-09-28 PROBLEM — R70.0 ELEVATED ERYTHROCYTE SEDIMENTATION RATE: Status: ACTIVE | Noted: 2023-02-04

## 2023-09-28 PROBLEM — Z96.1 BILATERAL PSEUDOPHAKIA: Status: ACTIVE | Noted: 2023-02-04

## 2023-09-28 PROBLEM — R49.0 HOARSE VOICE QUALITY: Status: ACTIVE | Noted: 2023-02-04

## 2023-09-28 PROBLEM — M85.80 OSTEOPENIA: Status: ACTIVE | Noted: 2023-02-04

## 2023-09-28 PROBLEM — R13.10 DYSPHAGIA: Status: ACTIVE | Noted: 2023-02-04

## 2023-09-28 PROBLEM — I49.1 PAC (PREMATURE ATRIAL CONTRACTION): Status: ACTIVE | Noted: 2023-02-04

## 2023-09-28 PROBLEM — R49.0 DYSPHONIA: Status: ACTIVE | Noted: 2023-02-04

## 2023-09-28 PROBLEM — K29.70 GASTRITIS: Status: ACTIVE | Noted: 2023-02-04

## 2023-09-28 PROBLEM — I25.10 ATHEROSCLEROSIS OF NATIVE CORONARY ARTERY WITHOUT ANGINA PECTORIS: Status: ACTIVE | Noted: 2023-02-04

## 2023-09-28 PROBLEM — E03.9 HYPOTHYROIDISM: Status: ACTIVE | Noted: 2023-02-04

## 2023-09-28 PROBLEM — H26.493 PCO (POSTERIOR CAPSULAR OPACIFICATION), BILATERAL: Status: ACTIVE | Noted: 2023-02-04

## 2023-09-28 PROBLEM — I49.3 PVC (PREMATURE VENTRICULAR CONTRACTION): Status: ACTIVE | Noted: 2023-02-04

## 2023-09-28 PROBLEM — H35.373 EPIRETINAL MEMBRANE (ERM) OF BOTH EYES: Status: ACTIVE | Noted: 2023-02-04

## 2023-09-28 PROBLEM — G44.209 TENSION TYPE HEADACHE: Status: ACTIVE | Noted: 2023-02-04

## 2023-09-28 PROBLEM — R55 VASOVAGAL NEAR SYNCOPE: Status: ACTIVE | Noted: 2023-02-04

## 2023-09-28 PROCEDURE — 3078F DIAST BP <80 MM HG: CPT | Performed by: PSYCHIATRY & NEUROLOGY

## 2023-09-28 PROCEDURE — 3074F SYST BP LT 130 MM HG: CPT | Performed by: PSYCHIATRY & NEUROLOGY

## 2023-09-28 PROCEDURE — 1123F ACP DISCUSS/DSCN MKR DOCD: CPT | Performed by: PSYCHIATRY & NEUROLOGY

## 2023-09-28 PROCEDURE — 99214 OFFICE O/P EST MOD 30 MIN: CPT | Performed by: PSYCHIATRY & NEUROLOGY

## 2023-09-28 RX ORDER — NITROGLYCERIN 0.4 MG/1
0.4 TABLET SUBLINGUAL EVERY 5 MIN PRN
COMMUNITY

## 2023-09-28 RX ORDER — ATORVASTATIN CALCIUM 40 MG/1
40 TABLET, FILM COATED ORAL
COMMUNITY
Start: 2023-08-22

## 2023-09-28 RX ORDER — PANTOPRAZOLE SODIUM 20 MG/1
20 TABLET, DELAYED RELEASE ORAL DAILY
COMMUNITY

## 2023-09-28 RX ORDER — DENOSUMAB 60 MG/ML
60 INJECTION SUBCUTANEOUS
COMMUNITY
Start: 2021-09-22

## 2023-09-28 ASSESSMENT — ENCOUNTER SYMPTOMS
NAUSEA: 0
SHORTNESS OF BREATH: 0
CHOKING: 0
VOMITING: 0
COLOR CHANGE: 0
PHOTOPHOBIA: 0
TROUBLE SWALLOWING: 0
BACK PAIN: 0

## 2023-09-28 NOTE — PROGRESS NOTES
Subjective:      Patient ID: Chito Castro is a 78 y.o. female who presents today for:  Chief Complaint   Patient presents with    6 Month Follow-Up     Pt states that she is having problems with her balance. She states that she will go toward one side or the other when she is walking. She states that she has a foggy feeling. She states that she feels like the inside of her head is like a bubbling feeling. Pt states that she has not had any falls. HPI 78 right-handed female with a history of intraventricular hemorrhage which was nontraumatic. Patient has shown complete insulation of the hemorrhage she has not any headaches and she has not developed any suggestion of hydrocephalus. Patient has not any further dizzy spells either. This likely represented idiopathic leak we did not find any other aneurysm. Is here as she is having some issues with balance feels to 1 side or the other while walking. She has some fogginess as well. States there is some bubbling feeling in her head. No overt memory issues    Bitemporal pain but no true headaches are described. She is somewhat worried and stressed about this whole situation. No past medical history on file. No past surgical history on file.   Social History     Socioeconomic History    Marital status: Single     Spouse name: Not on file    Number of children: Not on file    Years of education: Not on file    Highest education level: Not on file   Occupational History    Not on file   Tobacco Use    Smoking status: Never    Smokeless tobacco: Never   Substance and Sexual Activity    Alcohol use: Not on file    Drug use: Not on file    Sexual activity: Not on file   Other Topics Concern    Not on file   Social History Narrative    Not on file     Social Determinants of Health     Financial Resource Strain: Not on file   Food Insecurity: Not on file   Transportation Needs: Not on file   Physical Activity: Not on file   Stress: Not on file   Social

## 2023-09-29 VITALS — WEIGHT: 113.32 LBS | HEIGHT: 62 IN | BODY MASS INDEX: 20.85 KG/M2

## 2023-09-30 NOTE — H&P
History of Present Illness:   History Present Illness:  Reason for surgery: heartburn   HPI:    Laparoscopic hiatal hernia repair in 2021with heartburn    Allergies:        Allergies:  ·  Dilaudid : Resp Distress  ·  Cetacaine : Anaphylaxis  ·  aspirin : Bleeding  ·  Cardizem : Swelling/Edema  ·  penicillin : Unknown       Intolerances:  ·  Cymbalta : Headaches    Home Medication Review:   Home Medications Reviewed: yes     Impression/Procedure:   ·  Impression and Planned Procedure: EGD       ERAS (Enhanced Recovery After Surgery):  ·  ERAS Patient: no       Physical Exam by System:    Constitutional: Well developed, awake/alert/oriented  x3, no distress, alert and cooperative   Respiratory/Thorax: Patent airways, CTAB, normal  breath sounds with good chest expansion, thorax symmetric   Cardiovascular: Regular, rate and rhythm, no murmurs,  2+ equal pulses of the extremities, normal S 1and S 2   Gastrointestinal: Nondistended, soft, non-tender,  no rebound tenderness or guarding, no masses palpable, no organomegaly     Consent:   COVID-19 Consent:  ·  COVID-19 Risk Consent Surgeon has reviewed key risks related to the risk of shorty COVID-19 and if they contract COVID-19 what the risks are.       Electronic Signatures:  Delfin Stallworth)  (Signed 20-Sep-2023 08:05)   Authored: History of Present Illness, Allergies, Home  Medication Review, Impression/Procedure, ERAS, Physical Exam, Consent, Note Completion      Last Updated: 20-Sep-2023 08:05 by Delfin Stallworth)

## 2023-10-09 ENCOUNTER — HOSPITAL ENCOUNTER (OUTPATIENT)
Dept: CT IMAGING | Age: 78
Discharge: HOME | End: 2023-10-11
Attending: PSYCHIATRY & NEUROLOGY
Payer: COMMERCIAL

## 2023-10-09 DIAGNOSIS — I61.5 INTRAVENTRICULAR HEMORRHAGE (HCC): ICD-10-CM

## 2023-10-09 PROCEDURE — 70450 CT HEAD/BRAIN W/O DYE: CPT

## 2023-10-11 ENCOUNTER — OFFICE VISIT (OUTPATIENT)
Dept: PRIMARY CARE | Facility: CLINIC | Age: 78
End: 2023-10-11
Payer: COMMERCIAL

## 2023-10-11 DIAGNOSIS — Z23 ENCOUNTER FOR IMMUNIZATION: ICD-10-CM

## 2023-10-11 PROCEDURE — G0008 ADMIN INFLUENZA VIRUS VAC: HCPCS | Performed by: FAMILY MEDICINE

## 2023-10-11 PROCEDURE — 90662 IIV NO PRSV INCREASED AG IM: CPT | Performed by: FAMILY MEDICINE

## 2023-10-11 PROCEDURE — 1160F RVW MEDS BY RX/DR IN RCRD: CPT | Performed by: FAMILY MEDICINE

## 2023-10-11 PROCEDURE — 1159F MED LIST DOCD IN RCRD: CPT | Performed by: FAMILY MEDICINE

## 2023-10-11 PROCEDURE — 1036F TOBACCO NON-USER: CPT | Performed by: FAMILY MEDICINE

## 2023-10-11 NOTE — PROGRESS NOTES
Subjective   Patient ID: Hedy Pete is a 77 y.o. female who presents for No chief complaint on file..    HPI   Pt understands with verbalization that vaccines all have risks (to include: local reaction, systemic reaction). Pt has reviewed the VIS (Vaccine information sheet) and gives consent to have this vaccination despite the risks.  Review of Systems    Objective   There were no vitals taken for this visit.    Physical Exam    Assessment/Plan

## 2023-11-02 ENCOUNTER — OFFICE VISIT (OUTPATIENT)
Dept: PRIMARY CARE | Facility: CLINIC | Age: 78
End: 2023-11-02
Payer: COMMERCIAL

## 2023-11-02 VITALS
HEART RATE: 58 BPM | WEIGHT: 113.1 LBS | SYSTOLIC BLOOD PRESSURE: 136 MMHG | TEMPERATURE: 97.2 F | OXYGEN SATURATION: 98 % | DIASTOLIC BLOOD PRESSURE: 74 MMHG | HEIGHT: 62 IN | BODY MASS INDEX: 20.81 KG/M2

## 2023-11-02 DIAGNOSIS — E03.9 HYPOTHYROIDISM, UNSPECIFIED TYPE: ICD-10-CM

## 2023-11-02 DIAGNOSIS — E78.2 MIXED HYPERLIPIDEMIA: ICD-10-CM

## 2023-11-02 DIAGNOSIS — F32.0 CURRENT MILD EPISODE OF MAJOR DEPRESSIVE DISORDER WITHOUT PRIOR EPISODE (CMS-HCC): ICD-10-CM

## 2023-11-02 DIAGNOSIS — E55.9 VITAMIN D DEFICIENCY: ICD-10-CM

## 2023-11-02 DIAGNOSIS — I10 PRIMARY HYPERTENSION: Primary | ICD-10-CM

## 2023-11-02 DIAGNOSIS — K22.70 BARRETT'S ESOPHAGUS WITHOUT DYSPLASIA: ICD-10-CM

## 2023-11-02 DIAGNOSIS — M16.12 PRIMARY OSTEOARTHRITIS OF LEFT HIP: ICD-10-CM

## 2023-11-02 DIAGNOSIS — I47.10 PAROXYSMAL SVT (SUPRAVENTRICULAR TACHYCARDIA) (CMS-HCC): ICD-10-CM

## 2023-11-02 DIAGNOSIS — I25.10 CORONARY ARTERY DISEASE INVOLVING NATIVE CORONARY ARTERY OF NATIVE HEART, UNSPECIFIED WHETHER ANGINA PRESENT: ICD-10-CM

## 2023-11-02 DIAGNOSIS — M85.80 OSTEOPENIA, UNSPECIFIED LOCATION: ICD-10-CM

## 2023-11-02 DIAGNOSIS — K21.00 GASTROESOPHAGEAL REFLUX DISEASE WITH ESOPHAGITIS WITHOUT HEMORRHAGE: ICD-10-CM

## 2023-11-02 PROCEDURE — 3075F SYST BP GE 130 - 139MM HG: CPT | Performed by: FAMILY MEDICINE

## 2023-11-02 PROCEDURE — 1159F MED LIST DOCD IN RCRD: CPT | Performed by: FAMILY MEDICINE

## 2023-11-02 PROCEDURE — 1036F TOBACCO NON-USER: CPT | Performed by: FAMILY MEDICINE

## 2023-11-02 PROCEDURE — 3078F DIAST BP <80 MM HG: CPT | Performed by: FAMILY MEDICINE

## 2023-11-02 PROCEDURE — 99214 OFFICE O/P EST MOD 30 MIN: CPT | Performed by: FAMILY MEDICINE

## 2023-11-02 PROCEDURE — 1160F RVW MEDS BY RX/DR IN RCRD: CPT | Performed by: FAMILY MEDICINE

## 2023-11-02 RX ORDER — ESCITALOPRAM OXALATE 10 MG/1
10 TABLET ORAL DAILY
Qty: 90 TABLET | Refills: 0 | Status: SHIPPED | OUTPATIENT
Start: 2023-11-02

## 2023-11-02 RX ORDER — LEVOTHYROXINE SODIUM 25 UG/1
25 TABLET ORAL DAILY
Qty: 90 TABLET | Refills: 0 | Status: SHIPPED | OUTPATIENT
Start: 2023-11-02 | End: 2024-02-12 | Stop reason: SDUPTHER

## 2023-11-02 ASSESSMENT — PATIENT HEALTH QUESTIONNAIRE - PHQ9
2. FEELING DOWN, DEPRESSED OR HOPELESS: SEVERAL DAYS
SUM OF ALL RESPONSES TO PHQ9 QUESTIONS 1 AND 2: 2
10. IF YOU CHECKED OFF ANY PROBLEMS, HOW DIFFICULT HAVE THESE PROBLEMS MADE IT FOR YOU TO DO YOUR WORK, TAKE CARE OF THINGS AT HOME, OR GET ALONG WITH OTHER PEOPLE: SOMEWHAT DIFFICULT
1. LITTLE INTEREST OR PLEASURE IN DOING THINGS: SEVERAL DAYS

## 2023-11-02 NOTE — PROGRESS NOTES
Subjective   Patient ID: Hedy Pete is a 77 y.o. female who presents for Follow-up.    HPI     Her cat passed away that she has had for 17 years, feeling depressed.   No suicidal ideation.      Mammo: 2023  Dexa: 2023  Colonoscopy: 2018    Patient has hypertension.  Pt does not monitor her BP at home.  She denies any CP, SOB, and LE edema.  Patient is compliant with antihypertensive therapy and denies any noted side effects.      Patient has hyperlipidemia.  Patient tries to limit the amount of fatty foods and high cholesterol foods that are consumed.  She is compliant with her atorvastatin. She denies any noted side effects.     Pt has known CAD.  Pt follows with her cardiologist (Dr. Pollard) on a regular basis.   She denies any chest pain or shortness of breath with or without exertional activity.      Patient has hypothyroidism.   She has been compliant with the dosing of her levothyroxine.     She has vitamin D deficiency.  Pt has been compliant with the dosing of her over-the-counter vitamin D supplementation.      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 Stallworth.  She is not on any acid suppressing medications at this time.     Pt has OA of the left hip.  She continues to have some hip pain but she has continued to delay hip replacement which was recommended by orthopedics.   She mostly notes symptoms of LEFT hip pain and restricted motion when she externally rotates the hip such as putting on socks or shoes.     She has osteopenia.  A FRAX score reveals the need for treatment and she has been on Prolia.  She has tolerated the medication well.   12/5/2022: SHE IS DUE FOR A PROLIA INJECTION.     As previously noted:   Pt had intracranial hemorrhage (left intraventricular hemorrhage) and was hospitalized for 3-4 days at [facility] 4/2022. The bleeding was thought to be a result of her antiplatelet therapy.  She was treated by Dr. Canada  "(neurology).  She has followed with neurology (Dr. Canada) as directed        Review of Systems  Constitutional: Patient denies any fever, chills, loss of appetite, or unexplained weight loss.  Cardiovascular: Patient denies any chest pain, shortness of breath with exertion, tachycardia, palpitations, orthopnea, or paroxysmal nocturnal dyspnea.  Respiratory: Patient denies any cough, shortness breath, or wheezing.  Gastrointestinal: Patient denies any nausea, vomiting, diarrhea, constipation, melena, hematochezia, or reflux symptoms.  Skin: Denies any rashes or skin lesions.   Neurology: Patient denies any new motor or sensory losses.  Denies any numbness, tingling, weakness, and incoordination of the extremities.  Patient also denies any tremor, seizures, or gait instability.  Endocrinology: Denies any polyuria, polydipsia, polyphagia, or heat/cold intolerance.    SEE HPI ALSO    Objective   /74   Pulse 58   Temp 36.2 °C (97.2 °F)   Ht 1.575 m (5' 2\")   Wt 51.3 kg (113 lb 1.6 oz)   SpO2 98%   BMI 20.69 kg/m²     Physical Exam  General Appearance: Alert and cooperative, in no acute distress, well-developed/well-nourished.  Neck: Supple and without adenopathy or rigidity.  There is no JVD at 90° and no carotid bruits are noted.  There is no thyromegaly, thyroid tenderness, or palpable thyroid nodules.  Heart: Regular rate and rhythm without murmur or ectopy.  Respiratory: Lungs are clear to auscultation bilaterally with good air exchange.  Good respiratory effort and no accessory muscle use.  Skin: Good turgor, moist, warm and without rashes or lesions.  Neurological exam: Alert and oriented ×3, no tremor, normal gait.  Extremities: No clubbing, cyanosis, or edema    PSYCH: Somewhat depressed mood and flat affect.  Good insight and judgment, no delusions or thought disorders.    Assessment/Plan     Hypertension: Blood pressure appears adequately controlled and we will continue with the current " antihypertensive therapy.     Hyperlipidemia: Stable.   We will continue with the current dose of atorvastatin. Dietary changes, exercise, and maintenance of a healthy weight were discussed at length.      Coronary artery disease: Stable based on symptoms.   She is without any worrisome signs or symptoms for unstable angina. Risk factor modification was discussed. She will continue to follow up with cardiology as directed.   Had normal stress test 3/2/2023.     Paroxysmal SVT: Stable based on symptoms.  She is currently on metoprolol and has been managed by cardiology.  Pt has not experienced any palpitations or tachycardia.     Hypothyroidism: Stable on last labs.  We will continue current dose of levothyroxine.  We will continue to monitor.     GERD/ Villalpando's esophagus / Hiatal hernia: She underwent a revision of Nissen wrap previously.  5/2/2023: Pt reports that she is to undergo EGD by Dr. Stallworth for some persistent epigastric pain     Vitamin D Deficiency:   8/1/2023:  Her Vit D is low on her 7/27/2023 labs.  Pt is to restart Vitamin D AFTER clearing with neurology as she thinks she was told it would thin the blood and she has a hx of a brain aneurysm.  She will see Dr. Canada in AUGUST.     OA of left hip: She has persistent but stable pain.   Orthopedics has recommended she undergo a total replacement but she prefers to delay surgery.  She states her symptoms have greatly improved.     Osteopenia: She does meet criteria for treatment based on her FRAX score.   Pt is treated with Prolia.  12/5/2022: PROLIA administered.  Next PROLIA due June 5, 2023.   9/2023:  HER INSURANCE ([employer]) DENIED COVERAGE FOR PORLIA.    Major depression:    Her cat recently passed away and she is experiencing symptoms of depression.  We will start her on Lexapro and have her follow-up in 3 months if she responds well.  Recommended she follow-up in 1 month if symptoms have not improved.       Mammogram DUE  6/22/2024  DEXA DUE 6/22/2025      Orders Placed This Encounter   Procedures    Comprehensive Metabolic Panel    Lipid Panel    Vitamin D 25-Hydroxy,Total (for eval of Vitamin D levels)    TSH with reflex to Free T4 if abnormal     Requested Prescriptions     Signed Prescriptions Disp Refills    levothyroxine (Synthroid, Levoxyl) 25 mcg tablet 90 tablet 0     Sig: Take 1 tablet (25 mcg) by mouth once daily.    escitalopram (Lexapro) 10 mg tablet 90 tablet 0     Sig: Take 1 tablet (10 mg) by mouth once daily.        MCAW due 5/2024

## 2023-11-02 NOTE — PATIENT INSTRUCTIONS
Follow up in 3 months with labs to be done PRIOR.    It was a pleasure to see you today. Thank you for choosing us for your health care needs.    If you have lab or other testing completed and have not been informed of results within one week, please call the office for your results.    If you haven't done so, consider signing up for [facility], the [facility] personal health record. Ask the staff how you can get started.

## 2023-12-06 ENCOUNTER — OFFICE VISIT (OUTPATIENT)
Dept: PRIMARY CARE | Facility: CLINIC | Age: 78
End: 2023-12-06
Payer: COMMERCIAL

## 2023-12-06 VITALS
WEIGHT: 107 LBS | HEIGHT: 62 IN | SYSTOLIC BLOOD PRESSURE: 119 MMHG | OXYGEN SATURATION: 98 % | BODY MASS INDEX: 19.69 KG/M2 | DIASTOLIC BLOOD PRESSURE: 75 MMHG | HEART RATE: 75 BPM | TEMPERATURE: 97.2 F

## 2023-12-06 DIAGNOSIS — R29.898 UPPER EXTREMITY WEAKNESS: Primary | ICD-10-CM

## 2023-12-06 DIAGNOSIS — M79.10 MUSCLE PAIN: ICD-10-CM

## 2023-12-06 DIAGNOSIS — R29.898 WEAKNESS OF BOTH LOWER EXTREMITIES: ICD-10-CM

## 2023-12-06 DIAGNOSIS — R68.81 EARLY SATIETY: ICD-10-CM

## 2023-12-06 PROCEDURE — 99214 OFFICE O/P EST MOD 30 MIN: CPT | Performed by: PHYSICIAN ASSISTANT

## 2023-12-06 PROCEDURE — 3074F SYST BP LT 130 MM HG: CPT | Performed by: PHYSICIAN ASSISTANT

## 2023-12-06 PROCEDURE — 1160F RVW MEDS BY RX/DR IN RCRD: CPT | Performed by: PHYSICIAN ASSISTANT

## 2023-12-06 PROCEDURE — 1036F TOBACCO NON-USER: CPT | Performed by: PHYSICIAN ASSISTANT

## 2023-12-06 PROCEDURE — 3078F DIAST BP <80 MM HG: CPT | Performed by: PHYSICIAN ASSISTANT

## 2023-12-06 PROCEDURE — 1159F MED LIST DOCD IN RCRD: CPT | Performed by: PHYSICIAN ASSISTANT

## 2023-12-06 ASSESSMENT — PATIENT HEALTH QUESTIONNAIRE - PHQ9
2. FEELING DOWN, DEPRESSED OR HOPELESS: NOT AT ALL
1. LITTLE INTEREST OR PLEASURE IN DOING THINGS: NOT AT ALL
SUM OF ALL RESPONSES TO PHQ9 QUESTIONS 1 AND 2: 0

## 2023-12-06 NOTE — PROGRESS NOTES
"Subjective   Patient ID: Hedy Pete is a 78 y.o. female who presents for Headache and Fatigue.    HPI       Complains aches all over body, fatigue and headaches. Says that her legs want to give out started couple days ago onset.     Pt c/o of 8wk with 1 wk of worsening of tingling in her head, arm and leg weakness, fatigue (eventhough she sleeps a full night she is tired)  Balance is off, walking she feels like she walks sideways. She denies dizziness  + muscle pain with tenderness to touch on thighs and upper arms.    CT head done 10/9/23- ordered by DR NASH- showed nothing acute. HE has not told her the results and doesn't have any appts to be seen for the next 8 wk    Early satiety- only take few bites and then she feels full- she has lost 6# since last visit b/c of not eating.  She denies changes in stool, blood in stool, melena, or reflux sx.     Review of Systems  Constitutional: Patient denies any fever, chills, loss of appetite, or unexplained weight loss.  Cardiovascular: Patient denies any chest pain, shortness of breath with exertion, tachycardia, palpitations, orthopnea, or paroxysmal nocturnal dyspnea.  Respiratory: Patient denies any cough, shortness breath, or wheezing.  Gastrointestinal patient denies any nausea, vomiting, diarrhea, constipation, melena, hematochezia, or reflux symptoms  Skin: Denies any rashes or skin lesions   Neurology: Patient has new motor and  sensory changes to LE's.  + numbness, tingling, weakness, and incoordination of the extremities.  Patient also denies any tremor, seizures, or gait instability.  Endocrinology: Denies any polyuria, polydipsia, polyphagia, or heat/cold intolerance.    Objective   /75   Pulse 75   Temp 36.2 °C (97.2 °F)   Ht 1.575 m (5' 2\")   Wt 48.5 kg (107 lb)   SpO2 98%   BMI 19.57 kg/m²     Physical Exam  Gen. appearance: Alert and cooperative, in no acute distress, well-developed, thin elderly female.  Neck: Supple and without " adenopathy or rigidity.  There is no JVD at 90° and no carotid bruits are noted.  There is no thyromegaly, thyroid tenderness, or palpable thyroid nodules.  Heart: Regular rate and rhythm without murmur or ectopy.  Lungs: Lungs are clear to auscultation bilaterally with good air exchange.  Skin: Good turgor, moist, warm and without rashes or lesions.  Neurology:  Alert and oriented ×3, no tremor, normal but slow gait.  Cranial nerves II through XII are grossly intact.  Deep tendon reflexes are +2/4 and equal bilaterally for the upper and lower extremities.  Strength is +4/5 equal bilaterally upper and lower extremities.  Romberg testing is negative  Extremities: No clubbing, cyanosis, or edema    Assessment/Plan   Diagnoses and all orders for this visit:  Upper extremity weakness & Weakness of both lower extremities-  -     CBC; Future  -     Comprehensive Metabolic Panel; Future  -     ANGELA with Reflex to LORI; Future  -     C-Reactive Protein; Future  -     Sedimentation Rate; Future  -     Citrulline Antibody, IgG; Future  -     MR angio head wo IV contrast; Future  She is to have labs done today and we will call her with those results as soon as they are reviewed.  At any point time should her symptoms become worse she is to go immediately to the emergency room.  MRI will be scheduled before she leaves the office.    Muscle pain-thighs and upper arms.  Propria labs were ordered and we will call her with those results as soon as they are reviewed.  Again, at any point time symptoms should become worse she is to go immediately to the emergency room.    Early satiety- New onset with gradual worsening over the past 4 wk. She has lost 6#. Pt is to have labs done and we will like send to GI for further evaluation.  Blood in her stool, no changes in her stools.    Patient is to have labs done today and we we will decide on further follow-up once they are reviewed.    Patient understands that should they have testing  outside   facilities that we may not receive the results and was told to call us if they have not heard from our office within a week after testing.    [facility] uses voice recognition technology for dictations. Sometimes the software misinterprets words. Please take this into account when reading this.

## 2023-12-07 ENCOUNTER — LAB (OUTPATIENT)
Dept: LAB | Facility: LAB | Age: 78
End: 2023-12-07
Payer: COMMERCIAL

## 2023-12-07 DIAGNOSIS — R29.898 UPPER EXTREMITY WEAKNESS: ICD-10-CM

## 2023-12-07 LAB
ALBUMIN SERPL BCP-MCNC: 4.5 G/DL (ref 3.4–5)
ALP SERPL-CCNC: 57 U/L (ref 33–136)
ALT SERPL W P-5'-P-CCNC: 18 U/L (ref 7–45)
ANION GAP SERPL CALC-SCNC: 13 MMOL/L (ref 10–20)
AST SERPL W P-5'-P-CCNC: 22 U/L (ref 9–39)
BILIRUB SERPL-MCNC: 0.9 MG/DL (ref 0–1.2)
BUN SERPL-MCNC: 15 MG/DL (ref 6–23)
CALCIUM SERPL-MCNC: 9.7 MG/DL (ref 8.6–10.3)
CCP IGG SERPL-ACNC: <1 U/ML
CHLORIDE SERPL-SCNC: 101 MMOL/L (ref 98–107)
CO2 SERPL-SCNC: 28 MMOL/L (ref 21–32)
CREAT SERPL-MCNC: 0.76 MG/DL (ref 0.5–1.05)
CRP SERPL-MCNC: <0.1 MG/DL
ERYTHROCYTE [DISTWIDTH] IN BLOOD BY AUTOMATED COUNT: 12.8 % (ref 11.5–14.5)
ERYTHROCYTE [SEDIMENTATION RATE] IN BLOOD BY WESTERGREN METHOD: 4 MM/H (ref 0–30)
GFR SERPL CREATININE-BSD FRML MDRD: 81 ML/MIN/1.73M*2
GLUCOSE SERPL-MCNC: 106 MG/DL (ref 74–99)
HCT VFR BLD AUTO: 41.9 % (ref 36–46)
HGB BLD-MCNC: 14 G/DL (ref 12–16)
MCH RBC QN AUTO: 30 PG (ref 26–34)
MCHC RBC AUTO-ENTMCNC: 33.4 G/DL (ref 32–36)
MCV RBC AUTO: 90 FL (ref 80–100)
NRBC BLD-RTO: 0 /100 WBCS (ref 0–0)
PLATELET # BLD AUTO: 233 X10*3/UL (ref 150–450)
POTASSIUM SERPL-SCNC: 3.9 MMOL/L (ref 3.5–5.3)
PROT SERPL-MCNC: 6.8 G/DL (ref 6.4–8.2)
RBC # BLD AUTO: 4.66 X10*6/UL (ref 4–5.2)
SODIUM SERPL-SCNC: 138 MMOL/L (ref 136–145)
WBC # BLD AUTO: 7.7 X10*3/UL (ref 4.4–11.3)

## 2023-12-07 PROCEDURE — 85652 RBC SED RATE AUTOMATED: CPT

## 2023-12-07 PROCEDURE — 86140 C-REACTIVE PROTEIN: CPT

## 2023-12-07 PROCEDURE — 36415 COLL VENOUS BLD VENIPUNCTURE: CPT

## 2023-12-07 PROCEDURE — 85027 COMPLETE CBC AUTOMATED: CPT

## 2023-12-07 PROCEDURE — 86200 CCP ANTIBODY: CPT

## 2023-12-07 PROCEDURE — 86038 ANTINUCLEAR ANTIBODIES: CPT

## 2023-12-07 PROCEDURE — 80053 COMPREHEN METABOLIC PANEL: CPT

## 2023-12-08 LAB — ANA SER QL HEP2 SUBST: NEGATIVE

## 2023-12-13 ENCOUNTER — TELEPHONE (OUTPATIENT)
Dept: PRIMARY CARE | Facility: CLINIC | Age: 78
End: 2023-12-13
Payer: COMMERCIAL

## 2023-12-13 NOTE — TELEPHONE ENCOUNTER
Pt is aware, states she is feeling weak. Reports she has body aches, states she isn't stable, also says she feels like her legs want to give out. States she feels dead but walking is how she describes it

## 2023-12-13 NOTE — TELEPHONE ENCOUNTER
----- Message from Carolynn Mcclure PA-C sent at 12/13/2023  1:11 PM EST -----  Please call the patient regarding her labs, they were 100% normal

## 2023-12-18 ENCOUNTER — OFFICE VISIT (OUTPATIENT)
Dept: PRIMARY CARE | Facility: CLINIC | Age: 78
End: 2023-12-18
Payer: COMMERCIAL

## 2023-12-18 ENCOUNTER — LAB (OUTPATIENT)
Dept: LAB | Facility: LAB | Age: 78
End: 2023-12-18
Payer: COMMERCIAL

## 2023-12-18 VITALS
HEIGHT: 62 IN | WEIGHT: 107 LBS | DIASTOLIC BLOOD PRESSURE: 77 MMHG | SYSTOLIC BLOOD PRESSURE: 170 MMHG | HEART RATE: 65 BPM | TEMPERATURE: 97.6 F | BODY MASS INDEX: 19.69 KG/M2 | OXYGEN SATURATION: 98 %

## 2023-12-18 DIAGNOSIS — M62.81 MUSCLE WEAKNESS (GENERALIZED): ICD-10-CM

## 2023-12-18 DIAGNOSIS — M62.81 MUSCLE WEAKNESS (GENERALIZED): Primary | ICD-10-CM

## 2023-12-18 DIAGNOSIS — M79.10 MUSCLE PAIN: ICD-10-CM

## 2023-12-18 LAB
CK MB CFR SERPL CALC: NORMAL %
CK MB SERPL-CCNC: <1 NG/ML
CK SERPL-CCNC: 65 U/L (ref 0–215)

## 2023-12-18 PROCEDURE — 1159F MED LIST DOCD IN RCRD: CPT | Performed by: PHYSICIAN ASSISTANT

## 2023-12-18 PROCEDURE — 36415 COLL VENOUS BLD VENIPUNCTURE: CPT

## 2023-12-18 PROCEDURE — 82550 ASSAY OF CK (CPK): CPT

## 2023-12-18 PROCEDURE — 99213 OFFICE O/P EST LOW 20 MIN: CPT | Performed by: PHYSICIAN ASSISTANT

## 2023-12-18 PROCEDURE — 82553 CREATINE MB FRACTION: CPT

## 2023-12-18 PROCEDURE — 1160F RVW MEDS BY RX/DR IN RCRD: CPT | Performed by: PHYSICIAN ASSISTANT

## 2023-12-18 PROCEDURE — 82085 ASSAY OF ALDOLASE: CPT

## 2023-12-18 PROCEDURE — 1036F TOBACCO NON-USER: CPT | Performed by: PHYSICIAN ASSISTANT

## 2023-12-18 PROCEDURE — 3077F SYST BP >= 140 MM HG: CPT | Performed by: PHYSICIAN ASSISTANT

## 2023-12-18 PROCEDURE — 3078F DIAST BP <80 MM HG: CPT | Performed by: PHYSICIAN ASSISTANT

## 2023-12-18 ASSESSMENT — PATIENT HEALTH QUESTIONNAIRE - PHQ9
2. FEELING DOWN, DEPRESSED OR HOPELESS: SEVERAL DAYS
1. LITTLE INTEREST OR PLEASURE IN DOING THINGS: SEVERAL DAYS
10. IF YOU CHECKED OFF ANY PROBLEMS, HOW DIFFICULT HAVE THESE PROBLEMS MADE IT FOR YOU TO DO YOUR WORK, TAKE CARE OF THINGS AT HOME, OR GET ALONG WITH OTHER PEOPLE: SOMEWHAT DIFFICULT
SUM OF ALL RESPONSES TO PHQ9 QUESTIONS 1 AND 2: 2

## 2023-12-18 NOTE — PROGRESS NOTES
"Subjective   Patient ID: Heyd Pete is a 78 y.o. female who presents for Fatigue and Generalized Body Aches.    HPI     Patient complains of her muscle weakness, dizziness and headaches.   Suggested she would like the prolia for her bones.     She has no energy, she has no muscle strength. She feels weak. She wakes too early in the AM.   She is c/o being cold all the time.   She has rashes and does not have any injuries.  Pain is in arms, legs and she does have some headaches.   Pt c/o of 10 wk with 1 wk of worsening of tingling in her head, arm and leg weakness, fatigue (eventhough she sleeps a full night she is tired)  Balance is off, walking she feels like she walks sideways. She denies dizziness  + muscle pain with tenderness to touch on thighs and upper arms.     MRI head is pending.      Review of Systems  Constitutional: Patient denies any fever, chills, loss of appetite, or unexplained weight loss.  Cardiovascular: Patient denies any chest pain, shortness of breath with exertion, tachycardia, palpitations, orthopnea, or paroxysmal nocturnal dyspnea.  Respiratory: Patient denies any cough, shortness breath, or wheezing.  Gastrointestinal patient denies any nausea, vomiting, diarrhea, constipation, melena, hematochezia, or reflux symptoms  Skin: Denies any rashes or skin lesions   Neurology: Patient denies any new motor or sensory losses.  Denies any numbness, tingling, weakness, and incoordination of the extremities.  Patient also denies any tremor, seizures, or gait instability.  Endocrinology: Denies any polyuria, polydipsia, polyphagia, or heat/cold intolerance.    Objective   /70   Pulse 65   Temp 36.4 °C (97.6 °F)   Ht 1.575 m (5' 2\")   Wt 48.5 kg (107 lb)   SpO2 98%   BMI 19.57 kg/m²     Physical Exam  Gen. appearance: Alert and cooperative, in no acute distress, well-developed, well-nourished female.  Neck: Supple and without adenopathy or rigidity.  There is no JVD at 90° and no " carotid bruits are noted.  There is no thyromegaly, thyroid tenderness, or palpable thyroid nodules.  Heart: Regular rate and rhythm without murmur or ectopy.  Lungs: Lungs are clear to auscultation bilaterally with good air exchange.  Skin: Good turgor, moist, warm and without rashes or lesions.  Neurological exam: Alert and oriented ×3, no tremor, normal gait.  Extremities: No clubbing, cyanosis, or edema    Assessment/Plan   Diagnoses and all orders for this visit:  Muscle weakness & pain-   -     Aldolase; Future  -     CK total and CKMB; Future  She is to have an aldolase and CK today and we will call with those results as soon as possible.  If those are normal we will consider referring her to rheumatology.    MRI is on 12/28/23 for HA's    Patient understands that should they have testing outside   facilities that we may not receive the results and was told to call us if they have not heard from our office within a week after testing.    [facility] uses voice recognition technology for dictations. Sometimes the software misinterprets words. Please take this into account when reading this.

## 2023-12-20 ENCOUNTER — TELEPHONE (OUTPATIENT)
Dept: PRIMARY CARE | Facility: CLINIC | Age: 78
End: 2023-12-20
Payer: COMMERCIAL

## 2023-12-20 DIAGNOSIS — M62.81 MUSCLE WEAKNESS: Primary | ICD-10-CM

## 2023-12-20 LAB — ALDOLASE SERPL-CCNC: 4.2 U/L (ref 1.2–7.6)

## 2023-12-20 NOTE — TELEPHONE ENCOUNTER
----- Message from Carolynn Mcclure PA-C sent at 12/20/2023  4:38 PM EST -----  Please call the patient regarding her labs, they are NL. No muscle issues. I recommend she go to Rheum... does she want the referrral?

## 2023-12-28 ENCOUNTER — HOSPITAL ENCOUNTER (OUTPATIENT)
Dept: RADIOLOGY | Facility: HOSPITAL | Age: 78
Discharge: HOME | End: 2023-12-28
Payer: COMMERCIAL

## 2023-12-28 DIAGNOSIS — R29.898 UPPER EXTREMITY WEAKNESS: ICD-10-CM

## 2023-12-28 PROCEDURE — 70544 MR ANGIOGRAPHY HEAD W/O DYE: CPT

## 2023-12-28 PROCEDURE — 70544 MR ANGIOGRAPHY HEAD W/O DYE: CPT | Performed by: RADIOLOGY

## 2024-01-03 DIAGNOSIS — R90.89 ABNORMAL FINDING ON MRI OF BRAIN: Primary | ICD-10-CM

## 2024-01-04 ENCOUNTER — APPOINTMENT (OUTPATIENT)
Dept: RADIOLOGY | Facility: HOSPITAL | Age: 79
End: 2024-01-04
Payer: COMMERCIAL

## 2024-01-04 ENCOUNTER — TELEPHONE (OUTPATIENT)
Dept: PRIMARY CARE | Facility: CLINIC | Age: 79
End: 2024-01-04
Payer: COMMERCIAL

## 2024-01-04 NOTE — TELEPHONE ENCOUNTER
Patient wanted to let you know that she still has not gotten her MR done due to rescheduling purposes. She said she was advised by XRAY that she'd get a call next week to get scheduled. She also says she was unsure about it due to going out of the country at the end of next week for a wedding.

## 2024-01-08 NOTE — TELEPHONE ENCOUNTER
Patient called asking if it is safe to fly out the country [place] is going to [place]. She doesn't get the CT scan until 01/26/2024. Please advise

## 2024-01-19 ENCOUNTER — APPOINTMENT (OUTPATIENT)
Dept: RHEUMATOLOGY | Facility: CLINIC | Age: 79
End: 2024-01-19
Payer: COMMERCIAL

## 2024-01-26 ENCOUNTER — HOSPITAL ENCOUNTER (OUTPATIENT)
Dept: RADIOLOGY | Facility: HOSPITAL | Age: 79
Discharge: HOME | End: 2024-01-26
Payer: COMMERCIAL

## 2024-01-26 VITALS — WEIGHT: 105.82 LBS | BODY MASS INDEX: 19.47 KG/M2 | HEIGHT: 62 IN

## 2024-01-26 DIAGNOSIS — R90.89 ABNORMAL FINDING ON MRI OF BRAIN: ICD-10-CM

## 2024-01-26 PROCEDURE — A9575 INJ GADOTERATE MEGLUMI 0.1ML: HCPCS | Performed by: PHYSICIAN ASSISTANT

## 2024-01-26 PROCEDURE — 2550000001 HC RX 255 CONTRASTS: Performed by: PHYSICIAN ASSISTANT

## 2024-01-26 PROCEDURE — 70553 MRI BRAIN STEM W/O & W/DYE: CPT

## 2024-01-26 PROCEDURE — 70553 MRI BRAIN STEM W/O & W/DYE: CPT | Performed by: RADIOLOGY

## 2024-01-26 RX ORDER — GADOTERATE MEGLUMINE 376.9 MG/ML
10 INJECTION INTRAVENOUS
Status: COMPLETED | OUTPATIENT
Start: 2024-01-26 | End: 2024-01-26

## 2024-01-26 RX ADMIN — GADOTERATE MEGLUMINE 10 ML: 376.9 INJECTION INTRAVENOUS at 10:12

## 2024-01-28 DIAGNOSIS — F32.0 CURRENT MILD EPISODE OF MAJOR DEPRESSIVE DISORDER WITHOUT PRIOR EPISODE (CMS-HCC): ICD-10-CM

## 2024-01-28 DIAGNOSIS — E03.9 HYPOTHYROIDISM, UNSPECIFIED TYPE: ICD-10-CM

## 2024-01-29 RX ORDER — LEVOTHYROXINE SODIUM 25 UG/1
25 TABLET ORAL DAILY
Qty: 90 TABLET | Refills: 0 | OUTPATIENT
Start: 2024-01-29

## 2024-01-29 RX ORDER — ESCITALOPRAM OXALATE 10 MG/1
10 TABLET ORAL DAILY
Qty: 90 TABLET | Refills: 0 | OUTPATIENT
Start: 2024-01-29

## 2024-01-29 NOTE — TELEPHONE ENCOUNTER
Medication refused due to failing protocol.    Requested Prescriptions   Pending Prescriptions Disp Refills    levothyroxine (Synthroid, Levoxyl) 25 mcg tablet [Pharmacy Med Name: LEVOTHYROXINE 25 MCG TABLET] 90 tablet 0     Sig: TAKE 1 TABLET (25 MCG) BY MOUTH DAILY       Thyroid Hormones Protocol Passed - 1/28/2024  7:39 AM        Passed - Normal TSH in past 12 months     TSH   Date Value Ref Range Status   07/27/2023 2.64 0.44 - 3.98 mIU/L Final     Comment:      TSH testing is performed using different testing    methodology at [facility] than at other    [facility]. Direct result comparisons should    only be made within the same method.             Passed - No active pregnancy on record        Passed - No pregnancy test in the past 12 months or most recent test was negative        Passed - Visit with relevant provider in past 12 months or upcoming 90 days     Recent Visits  Date Type Provider Dept   12/18/23 Office Visit Carolynn Mcclure PA-C Do [id number]   12/06/23 Office Visit Carolynn Mcclure PA-C Do [id number]   11/02/23 Office Visit Donis Noriega, DO Do [id number]   10/11/23 Office Visit [facility]  [facility] PC1 RN 1 Do [id number]   08/02/23 Office Visit Donis Noriega, DO Do [id number]   05/02/23 Office Visit Donis Noriega, DO Do [id number]   Showing recent visits within past 365 days and meeting all other requirements  Future Appointments  Date Type Provider Dept   02/05/24 Appointment Donis Noriega, DO Do [id number]   Showing future appointments within next 90 days and meeting all other requirements              Passed - Medication not refilled in past 45 days (1.5 months)     No matching medication orders between 12/15/2023 12:32 PM and 1/29/2024 12:32 PM              escitalopram (Lexapro) 10 mg tablet [Pharmacy Med Name: ESCITALOPRAM 10 MG TABLET] 90 tablet 0     Sig: TAKE 1 TABLET BY MOUTH EVERY DAY       SSRI  Protocol Passed - 1/28/2024  7:39 AM        Passed - Blood pressure on record in past 15 months     BP Readings from Last 3 Encounters:   12/18/23 170/77   12/06/23 119/75   11/02/23 136/74               Passed - No active pregnancy on record        Passed - No pregnancy test in the past 12 months or most recent test was negative        Passed - Visit with relevant provider in past 12 months or upcoming 90 days     Recent Visits  Date Type Provider Dept   12/18/23 Office Visit Carolynn Mcclure PA-C Do [id number]   12/06/23 Office Visit Carolynn Mcclure PA-C Do [id number]   11/02/23 Office Visit Donis Noriega, DO Do [id number]   10/11/23 Office Visit [facility]  [facility] PC1 RN 1 Do [id number]   08/02/23 Office Visit Donis Noriega, DO Do [id number]   05/02/23 Office Visit Donis Noriega, DO Do [id number]   Showing recent visits within past 365 days and meeting all other requirements  Future Appointments  Date Type Provider Dept   02/05/24 Appointment Donis Noriega, DO Do [id number]   Showing future appointments within next 90 days and meeting all other requirements              Passed - Selective serotonin reuptake inhibitor not refilled in past 45 days (1.5 months)     No matching medication orders between 12/15/2023 12:32 PM and 1/29/2024 12:32 PM              Passed - Serotonin modulator not refilled in the past 45 days (1.5 months)     No matching medication orders between 12/15/2023 12:32 PM and 1/29/2024 12:32 PM            Passed - Depression screening on record in the past year

## 2024-01-29 NOTE — TELEPHONE ENCOUNTER
----- Message from Carolynn Mcclure PA-C sent at 1/29/2024  2:51 PM EST -----  Please call the patient regarding her MRI brain, no evidence of obstruction of the aqueduct but will await opinion of specialist

## 2024-02-05 ENCOUNTER — APPOINTMENT (OUTPATIENT)
Dept: PRIMARY CARE | Facility: CLINIC | Age: 79
End: 2024-02-05
Payer: COMMERCIAL

## 2024-02-05 ENCOUNTER — OFFICE VISIT (OUTPATIENT)
Dept: RHEUMATOLOGY | Facility: CLINIC | Age: 79
End: 2024-02-05
Payer: COMMERCIAL

## 2024-02-05 VITALS
TEMPERATURE: 98.1 F | SYSTOLIC BLOOD PRESSURE: 129 MMHG | HEIGHT: 62 IN | DIASTOLIC BLOOD PRESSURE: 77 MMHG | HEART RATE: 60 BPM | BODY MASS INDEX: 19.69 KG/M2 | WEIGHT: 107 LBS

## 2024-02-05 DIAGNOSIS — M62.81 MUSCLE WEAKNESS: ICD-10-CM

## 2024-02-05 PROCEDURE — 99204 OFFICE O/P NEW MOD 45 MIN: CPT | Performed by: STUDENT IN AN ORGANIZED HEALTH CARE EDUCATION/TRAINING PROGRAM

## 2024-02-05 PROCEDURE — 1036F TOBACCO NON-USER: CPT | Performed by: STUDENT IN AN ORGANIZED HEALTH CARE EDUCATION/TRAINING PROGRAM

## 2024-02-05 PROCEDURE — 1160F RVW MEDS BY RX/DR IN RCRD: CPT | Performed by: STUDENT IN AN ORGANIZED HEALTH CARE EDUCATION/TRAINING PROGRAM

## 2024-02-05 PROCEDURE — 3074F SYST BP LT 130 MM HG: CPT | Performed by: STUDENT IN AN ORGANIZED HEALTH CARE EDUCATION/TRAINING PROGRAM

## 2024-02-05 PROCEDURE — 1159F MED LIST DOCD IN RCRD: CPT | Performed by: STUDENT IN AN ORGANIZED HEALTH CARE EDUCATION/TRAINING PROGRAM

## 2024-02-05 PROCEDURE — 3078F DIAST BP <80 MM HG: CPT | Performed by: STUDENT IN AN ORGANIZED HEALTH CARE EDUCATION/TRAINING PROGRAM

## 2024-02-05 NOTE — PROGRESS NOTES
Rheumatology New Outpatient  Note    Subjective   Hedy Pete is a 78 y.o. female presenting today for Headache.    History of Presenting Problem:   Hedy with PMHx of CAD, HTN, HLP, SVT, hypothyroidism, vitamin D deficiency, Villalpando's esophagus, GERD, and depression is presenting today as a NP. She was referred by PCP Carolynn Mcclure PA-C for abnormal gait. She had worsening chronic headaches that comes in attacks and when it happens she also feels her leg are weak and has unsteady gait. She otherwise has no myalgia, arthralgia or joint pain. She denies hair loss, malar rash, photosensitivity, skin rashes, dry eyes, dry mouth, recurrent mouth sores, sudden vision loss, sudden hearing loss, seizures, psychosis, inflammatory eye disease, h/o blood clots, cold sensitivity in fingers, vasospastic color changes in fingers when exposed to extreme temperatures.    She is following with neurology at the Clinic for possible elevated intracranial pressures and enlarged ventricles and she is pending diagnostic LP.       Past Medical History:   Past Medical History:   Diagnosis Date    Intracranial hemorrhage (CMS/[facility]) 02/04/2023    Intraventricular hemorrhage (CMS/[facility]) 04/23/2022    Pericarditis 02/04/2023    Personal history of other diseases of the circulatory system     History of coronary artery disease    Personal history of other diseases of the circulatory system     History of hypertension    Personal history of other diseases of the digestive system     History of gastroesophageal reflux (GERD)    Personal history of other endocrine, nutritional and metabolic disease     History of high cholesterol    Personal history of other endocrine, nutritional and metabolic disease     History of hypothyroidism    S/p bilateral blepharoplasty 02/04/2023       Allergies:   Allergies   Allergen Reactions    Hydromorphone Shortness of breath and Other     Vomitting    Aspirin Unknown    Diltiazem Hcl Swelling     edema     "Duloxetine Headache and Nausea Only    Penicillins Unknown       Medications:   Current Outpatient Medications:     atorvastatin (Lipitor) 40 mg tablet, Take 1 tablet (40 mg) by mouth once daily at bedtime., Disp: , Rfl:     denosumab (Prolia) 60 mg/mL syringe, Inject 1 mL (60 mg) under the skin every 6 months., Disp: , Rfl:     ergocalciferol, vitamin D2, (VITAMIN D2 ORAL), Take 50 mcg by mouth early in the morning.., Disp: , Rfl:     escitalopram (Lexapro) 10 mg tablet, Take 1 tablet (10 mg) by mouth once daily., Disp: 90 tablet, Rfl: 0    levothyroxine (Synthroid, Levoxyl) 25 mcg tablet, Take 1 tablet (25 mcg) by mouth once daily., Disp: 90 tablet, Rfl: 0    metoprolol succinate XL (Toprol-XL) 25 mg 24 hr tablet, Take 1 tablet (25 mg) by mouth once daily., Disp: , Rfl:     nitroglycerin (Nitrostat) 0.4 mg SL tablet, Place 1 tablet (0.4 mg) under the tongue every 5 minutes if needed. PLACE 1 TABLET UNDER THE TONGUE EVERY 5 MINUTES UP TO 3 DOSES AS NEEDED FOR CHEST PAIN., Disp: , Rfl:     pantoprazole (ProtoNix) 20 mg EC tablet, Take 1 tablet (20 mg) by mouth early in the morning.. Do not crush, chew, or split., Disp: , Rfl:     Review of Systems:   Constitutional: Denies fever, chills   Eyes: Denies dry eyes, pain in the eyes   ENT: Denies dry mouth, loss of taste, sores in the mouth  Cardiovascular: Denies chest pain, palpitations   Respiratory: Denies shortness of breath   Gastrointestinal: Denies heartburn   Integumentary: Denies photosensitivity, rash or lesions, Raynaud's   Neurological: Denies any numbness or tingling    MSK: As per HPI.     All 10 review of systems have been reviewed and are negative for complaint except as noted in the HPI    Objective   Physical Examination:  Vitals:    02/05/24 1343   BP: 129/77   Pulse: 60   Temp: 36.7 °C (98.1 °F)     Growth %ile SmartLinks can only be used for patients less than 20 years old.  Ht Readings from Last 1 Encounters:   02/05/24 1.575 m (5' 2\")     Wt " Readings from Last 1 Encounters:   24 48.5 kg (107 lb)       General - NAD, sitting up in chair, well-groomed, pleasant, AAOx3  Head: Normocephalic, atraumatic  Eyes - PERRLA, EOMI. No conjunctiva injection.   Mouth/ENT - Moist oral and nasal mucosa. No facial rash.   Cardiovascular - Normal S1, S2. Regular rate and rhythm. No murmurs or rubs.  Lungs - Symmetric chest expansion. Clear to auscultation bilaterally.   Skin - No rashes or ulcers. Skin warm and dry. No erythema on bilateral cheeks.  Extremities - No edema, cyanosis ,or clubbing  Neurological - Alert and oriented x 3,  grossly intact. No focal deficit.  Musculoskeletal -  Shoulders: Full ROM, without pain, no swelling, warmth or tenderness.  Elbows: Full ROM, without pain, no swelling, warmth or tenderness.  Wrists: Full ROM, without pain, no swelling, warmth or tenderness.  MCP: No swelling, warmth or tenderness. Metacarpal squeeze negative  PIP: No swelling, warmth or tenderness.  DIP: No swelling, warmth or tenderness.  Hands : 5/5.    Sacroiliac joints: No local tenderness. BROOKE negative.   Hips: left hip limited abdution and external rotation  Knees:  Full ROM, without pain, no swelling, warmth or point tenderness.   Ankles: Full ROM, without pain, swelling, warmth or tenderness.  Toes:  Metatarsal squeeze negative  Cervical spine: No tenderness or limitation of movement  Lumbar spine: No tenderness or limitation of movement        Laboratory Testin2023: CBC/CMP /ESR/CRP/CK/Aldolase normal ANGELA/CCP-    Imaging:  Brain MRI 2024:   IMPRESSION:  No evidence of acute infarct, intracranial mass effect or midline  shift.      No evidence obstruction at the level of the cerebral aqueduct.      Mild-to-moderate volume loss. The ventricles appear slightly more  prominent than the prior exam 2020 which may be related to  central predominant volume loss or normal pressure hydrocephalus in  the correct clinical setting.       Mild-to-moderate nonspecific white matter changes compatible with  microangiopathy, slightly more prominent than the prior exam.      Asymmetric prominence of the right temporal horn compared to the left  may be related to asymmetric volume loss within the right medial  temporal lobe. There is no associated abnormal signal.        Assessment/Plan   Hedy Pete is a 78 y.o. female with PMHx of CAD, HTN, HLP, SVT, hypothyroidism, vitamin D deficiency, Villalpando's esophagus, GERD, and depression who is presenting today as a NP.    Chronic headaches, unsteady gait and abnormal brain MRI with enlarged ventricles pending complete neurological evaluation. Reviewed labs from 12/2023: CBC/CMP /ESR/CRP/CK/Aldolase normal. ANGELA-/CCP-. No signs/symptoms or autoimmune rheumatologic disease, no inflammatory myopathy or arthropathy. No further rheumatologic workup is needed.     The assessment and plan, risk and benefits were discussed with the patient. All of the patients questions were answered and patient agrees to the plan.      Velma Christianson MD  Clinical   Department of Rheumatology   [facility]

## 2024-02-12 ENCOUNTER — OFFICE VISIT (OUTPATIENT)
Dept: PRIMARY CARE | Facility: CLINIC | Age: 79
End: 2024-02-12
Payer: COMMERCIAL

## 2024-02-12 VITALS
HEART RATE: 64 BPM | OXYGEN SATURATION: 98 % | HEIGHT: 62 IN | WEIGHT: 110.4 LBS | TEMPERATURE: 97.3 F | BODY MASS INDEX: 20.32 KG/M2 | SYSTOLIC BLOOD PRESSURE: 138 MMHG | DIASTOLIC BLOOD PRESSURE: 60 MMHG

## 2024-02-12 DIAGNOSIS — I47.10 PAROXYSMAL SVT (SUPRAVENTRICULAR TACHYCARDIA) (CMS-HCC): ICD-10-CM

## 2024-02-12 DIAGNOSIS — M85.80 OSTEOPENIA, UNSPECIFIED LOCATION: ICD-10-CM

## 2024-02-12 DIAGNOSIS — I10 PRIMARY HYPERTENSION: Primary | ICD-10-CM

## 2024-02-12 DIAGNOSIS — I25.10 CORONARY ARTERY DISEASE INVOLVING NATIVE CORONARY ARTERY OF NATIVE HEART, UNSPECIFIED WHETHER ANGINA PRESENT: ICD-10-CM

## 2024-02-12 DIAGNOSIS — E55.9 VITAMIN D DEFICIENCY: ICD-10-CM

## 2024-02-12 DIAGNOSIS — K21.00 GASTROESOPHAGEAL REFLUX DISEASE WITH ESOPHAGITIS WITHOUT HEMORRHAGE: ICD-10-CM

## 2024-02-12 DIAGNOSIS — R51.9 CHRONIC NONINTRACTABLE HEADACHE, UNSPECIFIED HEADACHE TYPE: ICD-10-CM

## 2024-02-12 DIAGNOSIS — G89.29 CHRONIC NONINTRACTABLE HEADACHE, UNSPECIFIED HEADACHE TYPE: ICD-10-CM

## 2024-02-12 DIAGNOSIS — M16.12 PRIMARY OSTEOARTHRITIS OF LEFT HIP: ICD-10-CM

## 2024-02-12 DIAGNOSIS — F32.0 CURRENT MILD EPISODE OF MAJOR DEPRESSIVE DISORDER WITHOUT PRIOR EPISODE (CMS-HCC): ICD-10-CM

## 2024-02-12 DIAGNOSIS — K22.70 BARRETT'S ESOPHAGUS WITHOUT DYSPLASIA: ICD-10-CM

## 2024-02-12 DIAGNOSIS — E78.2 MIXED HYPERLIPIDEMIA: ICD-10-CM

## 2024-02-12 DIAGNOSIS — E03.9 HYPOTHYROIDISM, UNSPECIFIED TYPE: ICD-10-CM

## 2024-02-12 PROCEDURE — 1160F RVW MEDS BY RX/DR IN RCRD: CPT | Performed by: FAMILY MEDICINE

## 2024-02-12 PROCEDURE — 3078F DIAST BP <80 MM HG: CPT | Performed by: FAMILY MEDICINE

## 2024-02-12 PROCEDURE — 99214 OFFICE O/P EST MOD 30 MIN: CPT | Performed by: FAMILY MEDICINE

## 2024-02-12 PROCEDURE — 3075F SYST BP GE 130 - 139MM HG: CPT | Performed by: FAMILY MEDICINE

## 2024-02-12 PROCEDURE — 1036F TOBACCO NON-USER: CPT | Performed by: FAMILY MEDICINE

## 2024-02-12 PROCEDURE — 1159F MED LIST DOCD IN RCRD: CPT | Performed by: FAMILY MEDICINE

## 2024-02-12 RX ORDER — LEVOTHYROXINE SODIUM 25 UG/1
25 TABLET ORAL DAILY
Qty: 90 TABLET | Refills: 0 | Status: SHIPPED | OUTPATIENT
Start: 2024-02-12 | End: 2024-04-17 | Stop reason: SDUPTHER

## 2024-02-12 RX ORDER — ESCITALOPRAM OXALATE 10 MG/1
10 TABLET ORAL DAILY
Qty: 90 TABLET | Refills: 0 | Status: CANCELLED | OUTPATIENT
Start: 2024-02-12

## 2024-02-12 ASSESSMENT — PATIENT HEALTH QUESTIONNAIRE - PHQ9
1. LITTLE INTEREST OR PLEASURE IN DOING THINGS: NOT AT ALL
2. FEELING DOWN, DEPRESSED OR HOPELESS: NOT AT ALL
SUM OF ALL RESPONSES TO PHQ9 QUESTIONS 1 AND 2: 0

## 2024-02-12 NOTE — PATIENT INSTRUCTIONS
Follow up in 3 months with labs to be done PRIOR.    MEDICARE WELLNESS EXAM TO BE DONE AT THAT TIME.    It was a pleasure to see you today. Thank you for choosing us for your health care needs.    If you have lab or other testing completed and have not been informed of results within one week, please call the office for your results.    If you haven't done so, consider signing up for [facility] [employer], the [facility] personal health record. Ask the staff how you can get started.

## 2024-02-12 NOTE — PROGRESS NOTES
Subjective   Patient ID: Hedy Pete is a 78 y.o. female who presents for Follow-up.    HPI     2/12/2024:  Patient states she was told she may have increased pressure due to fluid around her brain not draining properly. She states her legs have been feeling heavy. She also states she has been experiencing brain fog.   Feels somewhat off balance at times.    She had a lumbar puncture and and MRI done at the [facility].  Pt had improved HA after the LP but only for 1 day.  She will have follow up to discuss results later this week.        Patient has hypertension.  Pt does not monitor her BP at home.  She denies any CP, SOB, and LE edema.  Patient is compliant with antihypertensive therapy and denies any noted side effects.      Patient has hyperlipidemia.  Patient tries to limit the amount of fatty foods and high cholesterol foods that are consumed.  She is compliant with her atorvastatin. She denies any noted side effects.     Pt has known CAD.  Pt follows with her cardiologist (Dr. Pollard) on a regular basis.   She denies any chest pain or shortness of breath with or without exertional activity.      Patient has hypothyroidism.   She has been compliant with the dosing of her levothyroxine.     She has vitamin D deficiency.  Pt has been compliant with the dosing of her over-the-counter vitamin D supplementation.      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 Stallworth.  She is not on any acid suppressing medications at this time.     Pt has OA of the left hip.  She continues to have some hip pain but she has continued to delay hip replacement which was recommended by orthopedics.   She mostly notes symptoms of LEFT hip pain and restricted motion when she flexes the knee and externally rotates the hip such as putting on socks or shoes.     She has osteopenia.  A FRAX score reveals the need for treatment and she has been on Prolia but her  "insurance no longer covers it.     As previously noted:   Pt had intracranial hemorrhage (left intraventricular hemorrhage) and was hospitalized for 3-4 days at [facility] 4/2022. The bleeding was thought to be a result of her antiplatelet therapy.  She was treated by Dr. Canada (neurology).  She has followed with neurology (Dr. Canada) as directed.    Review of Systems  Constitutional: Patient denies any fever, chills, loss of appetite, or unexplained weight loss.  She does have some fatigue.    Cardiovascular: Patient denies any chest pain, shortness of breath with exertion, tachycardia, palpitations, orthopnea, or paroxysmal nocturnal dyspnea.  Respiratory: Patient denies any cough, shortness breath, or wheezing.  Gastrointestinal: Patient denies any nausea, vomiting, diarrhea, constipation, melena, hematochezia, or reflux symptoms.  Skin: Denies any rashes or skin lesions.   Endocrinology: Denies any polyuria, polydipsia, polyphagia, or heat/cold intolerance.    Objective   /60   Pulse 64   Temp 36.3 °C (97.3 °F)   Ht 1.575 m (5' 2\")   Wt 50.1 kg (110 lb 6.4 oz)   SpO2 98%   BMI 20.19 kg/m²     Physical Exam  General Appearance: Alert and cooperative, in no acute distress, well-developed/well-nourished female.  Neck: Supple and without adenopathy or rigidity.  There is no JVD at 90° and no carotid bruits are noted.  There is no thyromegaly, thyroid tenderness, or palpable thyroid nodules.  Heart: Regular rate and rhythm without murmur or ectopy.  Respiratory: Lungs are clear to auscultation bilaterally with good air exchange.  Good respiratory effort and no accessory muscle use.  Skin: Good turgor, moist, warm and without rashes or lesions.  Neurological exam: Alert and oriented ×3, no tremor, normal gait.  Extremities: No clubbing, cyanosis, or edema    Assessment/Plan   Hypertension: Blood pressure appears adequately controlled and we will continue with the current antihypertensive therapy.   "   Hyperlipidemia: Stable.   We will continue with the current dose of atorvastatin. Dietary changes, exercise, and maintenance of a healthy weight were discussed at length.      Coronary artery disease: Stable based on symptoms.   She is without any worrisome signs or symptoms for unstable angina. Risk factor modification was discussed. She will continue to follow up with cardiology as directed.   Had normal stress test 3/2/2023.     Paroxysmal SVT: Stable based on symptoms.  She is currently on metoprolol and has been managed by cardiology.  Pt has not experienced any palpitations or tachycardia.     Hypothyroidism: Stable on last labs.  We will continue current dose of levothyroxine.  We will continue to monitor.     GERD/ Villalpando's esophagus / Hiatal hernia: She underwent a revision of Nissen wrap previously.  Stable symptoms at this time.     Vitamin D Deficiency:   Was low on her last labs.  Patient encouraged to be compliant with the dosing of her vitamin D supplementation.     OA of left hip: She has persistent but stable pain.   Orthopedics has recommended she undergo a total replacement but she prefers to delay surgery.  She states her symptoms have greatly improved.     Osteopenia: She does meet criteria for treatment based on her FRAX score.   Pt was treated with Prolia.  9/2023:  HER INSURANCE ([employer]AN) DENIED COVERAGE FOR PORLIA.  2/12/2024: We will discuss treatment options once her headaches are under control and the possible hydrocephalus is addressed.     Major depression:    Her cat recently passed away and she is experiencing symptoms of depression.  Started her on Lexapro at her 11/2/2023 appt.   She stopped taking the medication as she felt better.  No longer feels depressed at this point.     Headaches:  Being evaluated at the [facility] for possible hydrocephalus.  Has virtual meeting 2/16/2024 to discuss test results.    Mammogram DUE 6/22/2024  DEXA DUE  6/22/2025       Orders Placed This Encounter   Procedures    Comprehensive Metabolic Panel    Lipid Panel    TSH with reflex to Free T4 if abnormal     Requested Prescriptions     Signed Prescriptions Disp Refills    levothyroxine (Synthroid, Levoxyl) 25 mcg tablet 90 tablet 0     Sig: Take 1 tablet (25 mcg) by mouth once daily.       Patient will continue the current medications without change.  Current Outpatient Medications   Medication Instructions    atorvastatin (LIPITOR) 40 mg, oral, Nightly    ergocalciferol, vitamin D2, (VITAMIN D2 ORAL) 50 mcg, oral, Daily    escitalopram (LEXAPRO) 10 mg, oral, Daily    levothyroxine (SYNTHROID, LEVOXYL) 25 mcg, oral, Daily    metoprolol succinate XL (TOPROL-XL) 25 mg, oral, Daily    nitroglycerin (NITROSTAT) 0.4 mg, sublingual, Every 5 min PRN, PLACE 1 TABLET UNDER THE TONGUE EVERY 5 MINUTES UP TO 3 DOSES AS NEEDED FOR CHEST PAIN.    pantoprazole (PROTONIX) 20 mg, oral, Daily, Do not crush, chew, or split.     Prolia 60 mg, subcutaneous, Every 6 months

## 2024-02-23 ENCOUNTER — OFFICE VISIT (OUTPATIENT)
Dept: CARDIOLOGY | Facility: CLINIC | Age: 79
End: 2024-02-23
Payer: COMMERCIAL

## 2024-02-23 VITALS
HEART RATE: 60 BPM | WEIGHT: 110.6 LBS | SYSTOLIC BLOOD PRESSURE: 136 MMHG | DIASTOLIC BLOOD PRESSURE: 60 MMHG | BODY MASS INDEX: 20.23 KG/M2

## 2024-02-23 DIAGNOSIS — K21.00 GASTROESOPHAGEAL REFLUX DISEASE WITH ESOPHAGITIS WITHOUT HEMORRHAGE: ICD-10-CM

## 2024-02-23 DIAGNOSIS — E78.49 OTHER HYPERLIPIDEMIA: ICD-10-CM

## 2024-02-23 DIAGNOSIS — I47.10 PAROXYSMAL SVT (SUPRAVENTRICULAR TACHYCARDIA) (CMS-HCC): ICD-10-CM

## 2024-02-23 DIAGNOSIS — I25.10 ATHEROSCLEROSIS OF NATIVE CORONARY ARTERY OF NATIVE HEART WITHOUT ANGINA PECTORIS: Primary | ICD-10-CM

## 2024-02-23 DIAGNOSIS — I10 PRIMARY HYPERTENSION: ICD-10-CM

## 2024-02-23 PROCEDURE — 3075F SYST BP GE 130 - 139MM HG: CPT | Performed by: INTERNAL MEDICINE

## 2024-02-23 PROCEDURE — 3078F DIAST BP <80 MM HG: CPT | Performed by: INTERNAL MEDICINE

## 2024-02-23 PROCEDURE — 1160F RVW MEDS BY RX/DR IN RCRD: CPT | Performed by: INTERNAL MEDICINE

## 2024-02-23 PROCEDURE — 1036F TOBACCO NON-USER: CPT | Performed by: INTERNAL MEDICINE

## 2024-02-23 PROCEDURE — 1159F MED LIST DOCD IN RCRD: CPT | Performed by: INTERNAL MEDICINE

## 2024-02-23 PROCEDURE — 99214 OFFICE O/P EST MOD 30 MIN: CPT | Performed by: INTERNAL MEDICINE

## 2024-02-23 NOTE — PROGRESS NOTES
Patient:  Hedy Pete  YOB: 1945  MRN: [id number]       HPI:       Hedy Pete is a 78 y.o. female who returns today for cardiac follow-up.  She has a history of atherosclerotic heart disease with cardiac catheterization March 2013 showing 65% stenosis of the left anterior descending artery and approximately 70% stenosis of the right coronary artery. Fractional flow reserve was 0.92 and medical therapy was recommended. She has a history of hyperlipidemia. Typically her blood pressures run on the low side. She had some vasovagal episodes in the past.     She was previously experiencing bouts of severe burning discomfort in the epigastrium and lower chest region. She also developed progressive hoarseness and her symptoms were felt to be related to gastroesophageal reflux disease. She was diagnosed with an ulcer and hiatal hernia she underwent surgical repair by Dr. Stallworth. She had some persistent difficulties and underwent a redo operation. Her swallowing is now much improved. A previous stress echocardiogram on March 13, 2020 was negative for ischemia or infarction. LV ejection fraction was 50 to 55%. Valvular structure and function were normal.  An exercise myocardial perfusion study March 3, 2023 was negative for ischemia or infarction.  Calculated LV ejection fraction 55%.     Last year she described some exertional shortness of breath. She was scheduled for a stress test, however, she had to cancel that. She was admitted to [facility] in April 202 after presenting to the emergency room with complaints of a headache x 1 week. CT scan of the head showed small amount of intraventricular nuclear hemorrhage within the occipital horn of the left lateral ventricle. Due to the subarachnoid hemorrhage her aspirin was discontinued per neurology.       At the time of her last visit she noted that she has not felt right since the subarachnoid hemorrhage occurred.  She has been  undergoing extensive neurologic evaluation.  She was recently diagnosed with normal pressure hydrocephalus.  She is being considered for  shunt versus alternative therapies.  From a cardiac standpoint she has been doing well.  She denies any chest pain or shortness of breath.  She denies any orthopnea, PND, or increasing peripheral edema. She denies any palpitations, lightheadedness, near-syncope, or syncope. She denies any fever, chills, or cough. She denies any nausea, vomiting, or diaphoresis. She denies any hemoptysis, hematemesis, melena, or hematochezia.     She is free of any anginal or CHF symptoms.  Her blood pressures are well-controlled.  She does not have any active cardiac dysrhythmias.  Her functional capacity is in excess of 6 METS.  She is reasonably risk to proceed with neurosurgery if indicated.  Other details as noted below.     The above portion of this note was dictated by me using voice recognition software. I personally performed the services described in the documentation. The scribe entering the documentation below was in my presence. I affirm that the information is both accurate and complete.      Objective:     Vitals:    02/23/24 1305   BP: 136/60   Pulse: 60       Wt Readings from Last 4 Encounters:   02/23/24 50.2 kg (110 lb 9.6 oz)   02/12/24 50.1 kg (110 lb 6.4 oz)   02/05/24 48.5 kg (107 lb)   01/26/24 48 kg (105 lb 13.1 oz)       Allergies:     Allergies   Allergen Reactions    Hydromorphone Shortness of breath and Other     Vomitting    Aspirin Unknown    Diltiazem Hcl Swelling     edema    Duloxetine Headache and Nausea Only    Penicillins Unknown        Medications:     Current Outpatient Medications   Medication Instructions    atorvastatin (LIPITOR) 40 mg, oral, Nightly    ergocalciferol, vitamin D2, (VITAMIN D2 ORAL) 50 mcg, oral, Daily    escitalopram (LEXAPRO) 10 mg, oral, Daily    levothyroxine (SYNTHROID, LEVOXYL) 25 mcg, oral, Daily    metoprolol succinate XL  (TOPROL-XL) 25 mg, oral, Daily    nitroglycerin (NITROSTAT) 0.4 mg, sublingual, Every 5 min PRN, PLACE 1 TABLET UNDER THE TONGUE EVERY 5 MINUTES UP TO 3 DOSES AS NEEDED FOR CHEST PAIN.    pantoprazole (PROTONIX) 20 mg, oral, Daily, Do not crush, chew, or split.     Prolia 60 mg, subcutaneous, Every 6 months       Physical Examination:   GENERAL:  Well developed, well nourished, in no acute distress.  CHEST:  Symmetric and nontender.  NEURO/PSYCH:  Alert and oriented times three with approppriate behavior and responses.  NECK:  Supple, no JVD, no bruit.  LUNGS:  Clear to auscultation bilaterally, normal respiratory effort.  HEART:  Rate and rhythm regular with no evident murmur, no gallop appreciated.        There are no rubs, clicks or heaves.  EXTREMITIES:  Warm with good color, no clubbing or cyanosis.  There is no edema noted.  PERIPHERAL VASCULAR:  Pulses present and equally palpable; 2+ throughout.      Lab:     CBC:   Lab Results   Component Value Date    WBC 7.7 12/07/2023    RBC 4.66 12/07/2023    HGB 14.0 12/07/2023    HCT 41.9 12/07/2023     12/07/2023        CMP:    Lab Results   Component Value Date     12/07/2023    K 3.9 12/07/2023     12/07/2023    CO2 28 12/07/2023    BUN 15 12/07/2023    CREATININE 0.76 12/07/2023    GLUCOSE 106 (H) 12/07/2023    CALCIUM 9.7 12/07/2023       Lipid Profile:    Lab Results   Component Value Date    TRIG 63 08/21/2023    HDL 71.0 08/21/2023       TSH:    Lab Results   Component Value Date    TSH 2.64 07/27/2023       HgBA1c:    Lab Results   Component Value Date    HGBA1C 5.8 04/24/2022       BMP:  Lab Results   Component Value Date     12/07/2023     08/21/2023     (L) 07/27/2023     02/13/2023    K 3.9 12/07/2023    K 4.0 08/21/2023    K 4.1 07/27/2023    K 4.2 02/13/2023     12/07/2023     08/21/2023     07/27/2023     02/13/2023    CO2 28 12/07/2023    CO2 28 08/21/2023    CO2 29 07/27/2023    CO2  30 02/13/2023    BUN 15 12/07/2023    BUN 14 08/21/2023    BUN 12 07/27/2023    BUN 13 02/13/2023    CREATININE 0.76 12/07/2023    CREATININE 0.66 08/21/2023    CREATININE 0.67 07/27/2023    CREATININE 0.64 02/13/2023       CBC:  Lab Results   Component Value Date    WBC 7.7 12/07/2023    WBC 6.0 08/21/2023    WBC 5.9 02/13/2023    WBC 6.0 05/03/2022    RBC 4.66 12/07/2023    RBC 4.40 08/21/2023    RBC 4.35 02/13/2023    RBC 4.32 05/03/2022    HGB 14.0 12/07/2023    HGB 13.0 08/21/2023    HGB 12.7 02/13/2023    HGB 12.8 05/03/2022    HCT 41.9 12/07/2023    HCT 39.7 08/21/2023    HCT 39.0 02/13/2023    HCT 39.5 05/03/2022    MCV 90 12/07/2023    MCV 90 08/21/2023    MCV 90 02/13/2023    MCV 91 05/03/2022    MCH 30.0 12/07/2023    MCHC 33.4 12/07/2023    MCHC 32.7 08/21/2023    MCHC 32.6 02/13/2023    MCHC 32.4 05/03/2022    RDW 12.8 12/07/2023    RDW 12.8 08/21/2023    RDW 12.7 02/13/2023    RDW 12.1 05/03/2022     12/07/2023     08/21/2023     02/13/2023     05/03/2022       Hepatic Function Panel:    Lab Results   Component Value Date    ALKPHOS 57 12/07/2023    ALT 18 12/07/2023    AST 22 12/07/2023    PROT 6.8 12/07/2023    BILITOT 0.9 12/07/2023       Diagnostic Studies:     MR brain w and wo IV contrast  Result Date: 1/26/2024    No evidence of acute infarct, intracranial mass effect or midline shift.   No evidence obstruction at the level of the cerebral aqueduct.   Mild-to-moderate volume loss. The ventricles appear slightly more prominent than the prior exam 02/03/2020 which may be related to central predominant volume loss or normal pressure hydrocephalus in the correct clinical setting.   Mild-to-moderate nonspecific white matter changes compatible with microangiopathy, slightly more prominent than the prior exam.   Asymmetric prominence of the right temporal horn compared to the left may be related to asymmetric volume loss within the right medial temporal lobe. There is no  associated abnormal signal.       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    CAD (coronary artery disease)    Dense breasts    Dysphagia    Dysphonia    Early stage nonexudative age-related macular degeneration of both eyes    Elevated erythrocyte sedimentation rate    Epiretinal membrane (ERM) of both eyes    Excessive tear production of both lacrimal glands    Fecal retention    Gastritis    GERD (gastroesophageal reflux disease)    Headache, tension-type    Hiatal hernia with GERD    HLD (hyperlipidemia)    Hoarse voice quality    HTN (hypertension)    Hypothyroidism    Intestinal metaplasia of gastric mucosa    Laryngopharyngeal reflux (LPR)    Osteopenia    Ovarian failure    PAC (premature atrial contraction)    Paroxysmal SVT (supraventricular tachycardia)    PCO (posterior capsular opacification), bilateral    Periumbilical abdominal pain    PVC (premature ventricular contraction)    PVD (posterior vitreous detachment), both eyes    Sinus tachycardia    SOB (shortness of breath) on exertion    Vasovagal near syncope    Vitamin D deficiency    Tension type headache    Primary osteoarthritis of left hip    Senile cataract    Current mild episode of major depressive disorder without prior episode (CMS/[facility])       Asessment:     Problem List Items Addressed This Visit             ICD-10-CM    Atherosclerosis of native coronary artery without angina pectoris - Primary I25.10    Relevant Orders    Follow Up In Cardiology    Comprehensive metabolic panel    Lipid panel    GERD (gastroesophageal reflux disease) K21.9    Relevant Orders    Follow Up In  Cardiology    Comprehensive metabolic panel    Lipid panel    HLD (hyperlipidemia) E78.5    Relevant Orders    Follow Up In Cardiology    Comprehensive metabolic panel    Lipid panel    HTN (hypertension) I10    Paroxysmal SVT (supraventricular tachycardia) I47.10

## 2024-03-11 PROBLEM — G44.201 ACUTE INTRACTABLE TENSION-TYPE HEADACHE: Status: ACTIVE | Noted: 2023-02-04

## 2024-03-14 ENCOUNTER — TELEPHONE (OUTPATIENT)
Dept: CARDIOLOGY | Facility: CLINIC | Age: 79
End: 2024-03-14
Payer: COMMERCIAL

## 2024-03-14 NOTE — TELEPHONE ENCOUNTER
Patient presented to the office stating she is having a ETV procedure with Dr. Childers at the end of March. Patient was advised to contact Dr. Jairo Pollard MD, FACC to be cleared for the procedure. Please advise.   Jennifer Burdick MA

## 2024-03-15 NOTE — TELEPHONE ENCOUNTER
Advised patient of message and verbalized understanding. Office note from February 23rd and telephone note faxed to Dr. Childers's office via desktop. CPaJose

## 2024-03-28 ENCOUNTER — OFFICE VISIT (OUTPATIENT)
Dept: NEUROLOGY | Age: 79
End: 2024-03-28
Payer: COMMERCIAL

## 2024-03-28 VITALS
BODY MASS INDEX: 20.49 KG/M2 | HEART RATE: 74 BPM | DIASTOLIC BLOOD PRESSURE: 72 MMHG | WEIGHT: 112 LBS | SYSTOLIC BLOOD PRESSURE: 130 MMHG

## 2024-03-28 DIAGNOSIS — G91.1 OBSTRUCTIVE HYDROCEPHALUS (HCC): ICD-10-CM

## 2024-03-28 DIAGNOSIS — Q03.0 AQUEDUCTAL STENOSIS (HCC): ICD-10-CM

## 2024-03-28 DIAGNOSIS — I61.5 INTRAVENTRICULAR HEMORRHAGE, NONTRAUMATIC (HCC): Primary | ICD-10-CM

## 2024-03-28 DIAGNOSIS — I61.0 NONTRAUMATIC SUBCORTICAL HEMORRHAGE OF CEREBRAL HEMISPHERE, UNSPECIFIED LATERALITY (HCC): ICD-10-CM

## 2024-03-28 PROCEDURE — 1123F ACP DISCUSS/DSCN MKR DOCD: CPT | Performed by: PSYCHIATRY & NEUROLOGY

## 2024-03-28 PROCEDURE — 3078F DIAST BP <80 MM HG: CPT | Performed by: PSYCHIATRY & NEUROLOGY

## 2024-03-28 PROCEDURE — 99214 OFFICE O/P EST MOD 30 MIN: CPT | Performed by: PSYCHIATRY & NEUROLOGY

## 2024-03-28 PROCEDURE — 3075F SYST BP GE 130 - 139MM HG: CPT | Performed by: PSYCHIATRY & NEUROLOGY

## 2024-03-28 RX ORDER — ESCITALOPRAM OXALATE 10 MG/1
10 TABLET ORAL DAILY
COMMUNITY
Start: 2023-11-02

## 2024-03-28 ASSESSMENT — ENCOUNTER SYMPTOMS
COLOR CHANGE: 0
CHOKING: 0
BACK PAIN: 0
VOMITING: 0
PHOTOPHOBIA: 0
NAUSEA: 0
SHORTNESS OF BREATH: 0
TROUBLE SWALLOWING: 0

## 2024-03-28 NOTE — PROGRESS NOTES
Subjective:      Patient ID: Dottie Corcoran is a 78 y.o. female who presents today for:  Chief Complaint   Patient presents with    6 Month Follow-Up     Pt states she is having operations for the water around her brain. Pt wants to discuss what can she do to prevent it for coming again.        HPI 78 right-handed female with history of intracerebral hemorrhage is nontraumatic.  Since last seen we had further obtain a CT scan of the head and all her blood and resolved.  Patient has not any other issues though I see that in January patient was seen at [facility] as I see MRIs and MRAs from follow results.  Patient was seen there for increased dilatation of third and lateral ventricles.  Patient had extensive relation and also lumbar puncture with opening pressure of 17 with a closing pressure of 9.  Patient had felt better 2 days after this and therefore was referred to Dr. Carrillo and she is awaiting a procedure I am not quite sure what has been decided as she required a cardiac clear.  Patient's MRI comparison was from 2020 but I do not see that that was fully correctly diagnosed as a critical stenosis.  In any case the patient is undergoing a procedure of EPV which is percutaneous dilatation of the aqueduct.  Patient reported that she was having fogginess of the head and therefore she went for evaluation.  Patient has had some memory issues but functional and I do not think that she has a dementia as has no triad of normal pressure hydrocephalus or aqueduct  No past medical history on file.  No past surgical history on file.  Social History     Socioeconomic History    Marital status: Single     Spouse name: Not on file    Number of children: Not on file    Years of education: Not on file    Highest education level: Not on file   Occupational History    Not on file   Tobacco Use    Smoking status: Never    Smokeless tobacco: Never   Substance and Sexual Activity    Alcohol use: Not on file    Drug use:

## 2024-04-11 ENCOUNTER — PATIENT OUTREACH (OUTPATIENT)
Dept: CARE COORDINATION | Facility: CLINIC | Age: 79
End: 2024-04-11
Payer: COMMERCIAL

## 2024-04-11 ENCOUNTER — TELEPHONE (OUTPATIENT)
Dept: PRIMARY CARE | Facility: CLINIC | Age: 79
End: 2024-04-11
Payer: COMMERCIAL

## 2024-04-11 RX ORDER — ACETAMINOPHEN 325 MG/1
650 TABLET ORAL EVERY 4 HOURS PRN
COMMUNITY
Start: 2024-04-09

## 2024-04-11 RX ORDER — AMOXICILLIN 250 MG
1 CAPSULE ORAL EVERY 12 HOURS PRN
COMMUNITY
Start: 2024-04-09

## 2024-04-11 RX ORDER — OXYCODONE HYDROCHLORIDE 5 MG/1
5 TABLET ORAL EVERY 4 HOURS PRN
COMMUNITY
Start: 2024-04-09 | End: 2024-04-11

## 2024-04-11 NOTE — PROGRESS NOTES
Discharge Facility:  [facility]   Discharge Diagnosis:  Hydrocephalus, adult   Aqueductal stenosis   Coronary artery disease involving native coronary artery of native heart   Primary hypertension   Mixed hyperlipidemia   Admission Date:  04/08/2024       Discharge Date:   04/09/2024    PCP Appointment Date:  TBD  Specialist Appointment Date: [facility] KODAK Sidhu RN 4/22/2024   Hospital Encounter and Summary: Linked   See discharge assessment below for further details   Engagement  Call Start Time: 1015 (4/11/2024 10:19 AM)    Medications  Medications reviewed with patient/caregiver?: Yes (4/11/2024 10:19 AM)  Is the patient having any side effects they believe may be caused by any medication additions or changes?: No (4/11/2024 10:19 AM)  Does the patient have all medications ordered at discharge?: Yes (4/11/2024 10:19 AM)  Care Management Interventions: No intervention needed (4/11/2024 10:19 AM)  Prescription Comments: oxyCODONE IR 5 mg immediate release tablet  · senna-docusate 8.6-50 mg per tablet (pt states she is getting relief using Tylenol) (4/11/2024 10:19 AM)  Is the patient taking all medications as directed (includes completed medication regime)?: Yes (4/11/2024 10:19 AM)  Care Management Interventions: Provided patient education (4/11/2024 10:19 AM)    Appointments  Does the patient have a primary care provider?: Yes (4/11/2024 10:19 AM)  Care Management Interventions: Educated patient on importance of making appointment (No follow up appointments available with Dr Noriega. Tasked to the office.) (4/11/2024 10:19 AM)    Self Management  What is the home health agency?: denies need (4/11/2024 10:19 AM)  What Durable Medical Equipment (DME) was ordered?: denies need (4/11/2024 10:19 AM)    Patient Teaching  Does the patient have access to their discharge instructions?: Yes (4/11/2024 10:19 AM)  Care Management Interventions: Reviewed instructions with patient (4/11/2024 10:19 AM)  What is the  patient's perception of their health status since discharge?: Improving (4/11/2024 10:19 AM)  Is the patient/caregiver able to teach back the hierarchy of who to call/visit for symptoms/problems? PCP, Specialist, Home Health nurse, Urgent Care, ED, 911: Yes (4/11/2024 10:19 AM)    Wrap Up  Wrap Up Additional Comments: This CM spoke with pt via phone. Pt reports doing well at home since discharge. New meds reviewed. Pt denies CP and SOB. Wound care discussed at length. Pt aware of s/s to report to provider. Patient denies any further discharge questions/needs at this time.   Emphasized that Follow up is needed after discharge to review the hospital recommendations, assess your response to your treatment.  Pt aware of my availability for non-emergent concerns. Contact info provided to patient (4/11/2024 10:19 AM)

## 2024-04-11 NOTE — TELEPHONE ENCOUNTER
----- Message from Mame Wilkes RN sent at 4/11/2024 10:31 AM EDT -----  Regarding: TCM  Hello,      Can you please contact pt to schedule hosp  follow up within 14 days of discharge.  This patient was discharged from: [facility]   Discharge diagnosis:   Hydrocephalus, adult   Aqueductal stenosis   Coronary artery disease involving native coronary artery of native heart   Primary hypertension   Mixed hyperlipidemia      Date of discharge:  04/09/2024        Thank you

## 2024-04-17 ENCOUNTER — OFFICE VISIT (OUTPATIENT)
Dept: PRIMARY CARE | Facility: CLINIC | Age: 79
End: 2024-04-17
Payer: COMMERCIAL

## 2024-04-17 VITALS
HEIGHT: 62 IN | WEIGHT: 110.9 LBS | HEART RATE: 78 BPM | OXYGEN SATURATION: 98 % | BODY MASS INDEX: 20.41 KG/M2 | SYSTOLIC BLOOD PRESSURE: 138 MMHG | DIASTOLIC BLOOD PRESSURE: 60 MMHG | TEMPERATURE: 98.2 F

## 2024-04-17 DIAGNOSIS — Z09 HOSPITAL DISCHARGE FOLLOW-UP: Primary | ICD-10-CM

## 2024-04-17 DIAGNOSIS — I47.10 PAROXYSMAL SVT (SUPRAVENTRICULAR TACHYCARDIA) (CMS-HCC): ICD-10-CM

## 2024-04-17 DIAGNOSIS — I25.10 CORONARY ARTERY DISEASE INVOLVING NATIVE CORONARY ARTERY OF NATIVE HEART, UNSPECIFIED WHETHER ANGINA PRESENT: ICD-10-CM

## 2024-04-17 DIAGNOSIS — K22.70 BARRETT'S ESOPHAGUS WITHOUT DYSPLASIA: ICD-10-CM

## 2024-04-17 DIAGNOSIS — Q03.0 AQUEDUCTAL STENOSIS (MULTI): ICD-10-CM

## 2024-04-17 DIAGNOSIS — F32.0 CURRENT MILD EPISODE OF MAJOR DEPRESSIVE DISORDER WITHOUT PRIOR EPISODE (CMS-HCC): ICD-10-CM

## 2024-04-17 DIAGNOSIS — K21.00 GASTROESOPHAGEAL REFLUX DISEASE WITH ESOPHAGITIS WITHOUT HEMORRHAGE: ICD-10-CM

## 2024-04-17 DIAGNOSIS — M85.80 OSTEOPENIA, UNSPECIFIED LOCATION: ICD-10-CM

## 2024-04-17 DIAGNOSIS — E03.9 HYPOTHYROIDISM, UNSPECIFIED TYPE: ICD-10-CM

## 2024-04-17 DIAGNOSIS — I10 PRIMARY HYPERTENSION: ICD-10-CM

## 2024-04-17 DIAGNOSIS — E78.2 MIXED HYPERLIPIDEMIA: ICD-10-CM

## 2024-04-17 DIAGNOSIS — M16.12 PRIMARY OSTEOARTHRITIS OF LEFT HIP: ICD-10-CM

## 2024-04-17 PROBLEM — R12 HEARTBURN: Status: ACTIVE | Noted: 2023-09-20

## 2024-04-17 PROBLEM — G82.20 PARAPARESIS (MULTI): Status: ACTIVE | Noted: 2023-12-07

## 2024-04-17 PROBLEM — Z86.39 HISTORY OF HYPOTHYROIDISM: Status: ACTIVE | Noted: 2024-04-17

## 2024-04-17 PROBLEM — M62.81 MUSCLE WEAKNESS: Status: ACTIVE | Noted: 2023-12-18

## 2024-04-17 PROBLEM — Z86.39 HISTORY OF METABOLIC DISORDER: Status: ACTIVE | Noted: 2024-04-17

## 2024-04-17 PROBLEM — R50.9 FEVER: Status: ACTIVE | Noted: 2024-04-17

## 2024-04-17 PROBLEM — I61.0: Status: ACTIVE | Noted: 2024-03-28

## 2024-04-17 PROBLEM — R26.89 BALANCE DISORDER: Status: ACTIVE | Noted: 2023-09-28

## 2024-04-17 PROBLEM — Z86.79 HISTORY OF HYPERTENSION: Status: ACTIVE | Noted: 2024-04-17

## 2024-04-17 PROBLEM — J18.9 PNEUMONIA: Status: ACTIVE | Noted: 2020-11-26

## 2024-04-17 PROBLEM — H16.9 KERATITIS: Status: ACTIVE | Noted: 2024-04-17

## 2024-04-17 PROBLEM — G91.1 OBSTRUCTIVE HYDROCEPHALUS (MULTI): Status: ACTIVE | Noted: 2024-03-28

## 2024-04-17 PROBLEM — R10.31 RIGHT LOWER QUADRANT ABDOMINAL PAIN: Status: ACTIVE | Noted: 2018-05-11

## 2024-04-17 PROBLEM — H16.229 KERATOCONJUNCTIVITIS SICCA: Status: ACTIVE | Noted: 2024-04-17

## 2024-04-17 PROBLEM — M35.01 KERATOCONJUNCTIVITIS SICCA (MULTI): Status: ACTIVE | Noted: 2024-04-17

## 2024-04-17 PROBLEM — M79.10 MUSCLE PAIN: Status: ACTIVE | Noted: 2024-04-17

## 2024-04-17 PROBLEM — Z98.890 POSTPROCEDURAL STATE: Status: ACTIVE | Noted: 2023-09-20

## 2024-04-17 PROCEDURE — 3078F DIAST BP <80 MM HG: CPT | Performed by: FAMILY MEDICINE

## 2024-04-17 PROCEDURE — 1159F MED LIST DOCD IN RCRD: CPT | Performed by: FAMILY MEDICINE

## 2024-04-17 PROCEDURE — 3075F SYST BP GE 130 - 139MM HG: CPT | Performed by: FAMILY MEDICINE

## 2024-04-17 PROCEDURE — 1160F RVW MEDS BY RX/DR IN RCRD: CPT | Performed by: FAMILY MEDICINE

## 2024-04-17 PROCEDURE — 99495 TRANSJ CARE MGMT MOD F2F 14D: CPT | Performed by: FAMILY MEDICINE

## 2024-04-17 RX ORDER — LEVOTHYROXINE SODIUM 25 UG/1
25 TABLET ORAL DAILY
Qty: 90 TABLET | Refills: 0 | Status: SHIPPED | OUTPATIENT
Start: 2024-04-17

## 2024-04-17 NOTE — PROGRESS NOTES
"Subjective   Patient ID: Hedy Pete is a 78 y.o. female who presents for Hospital Follow-up.    HPI     Patient states she was admitted into [facility] 4/8/24 for hydrocephalus due to aqueductal stenosis. She states she felt like she had lots of pressure in her head and was experiencing severe head fog.    The main problem requiring admission was hydrocephalus due to. The discharge summary and/or Transitional Care Management documentation was reviewed. Medication reconciliation was performed.    She was diagnosed with aqueduct stenosis and underwent an endoscopic 3rd ventriculostomy.    She states she feels better overall since then but she says she feels somewhat fatigued.     Hedy Pete was contacted by Transitional Care Management services two days after her discharge. This encounter and supporting documentation was reviewed.    Review of Systems  Constitutional: Patient denies any fever, chills, loss of appetite, or unexplained weight loss.  Cardiovascular: Patient denies any chest pain, shortness of breath with exertion, tachycardia, palpitations, orthopnea, or paroxysmal nocturnal dyspnea.  Respiratory: Patient denies any cough, shortness breath, or wheezing.  Gastrointestinal: Patient denies any nausea, vomiting, diarrhea, constipation, melena, hematochezia, or reflux symptoms.  Skin: Denies any rashes or skin lesions.   Neurology: Patient denies any new motor or sensory losses.  Denies any numbness, tingling, weakness, and incoordination of the extremities.  Patient also denies any tremor, seizures, or gait instability. No headache.  Endocrinology: Denies any polyuria, polydipsia, polyphagia, or heat/cold intolerance.      Objective   /60   Pulse 78   Temp 36.8 °C (98.2 °F)   Ht 1.575 m (5' 2\")   Wt 50.3 kg (110 lb 14.4 oz)   SpO2 98%   BMI 20.28 kg/m²     Physical Exam  General Appearance: Alert and cooperative, in no acute distress, " well-developed/well-nourished.  Neck: Supple and without adenopathy or rigidity.  There is no JVD at 90° and no carotid bruits are noted.  There is no thyromegaly, thyroid tenderness, or palpable thyroid nodules.  Heart: Regular rate and rhythm without murmur or ectopy.  Respiratory: Lungs are clear to auscultation bilaterally with good air exchange.  Good respiratory effort and no accessory muscle use.  Skin: Good turgor, moist, warm and without rashes or lesions.  Neurological exam: Alert and oriented ×3, no tremor, normal gait.  Extremities: No clubbing, cyanosis, or edema      Assessment/Plan     Hospital discharge follow up:  Pt doing well after her endoscopic 3rd ventriculostomy.  Headaches have resolved.    Aqueductal stenosis:  Symptoms resolved with endoscopic 3rd ventriculostomy    Hypothyroidism: Stable on last labs.  We will continue current dose of levothyroxine.  We will continue to monitor.    Hypertension:   Blood pressure stable.  Continue the current medications.     Hyperlipidemia: Stable on last labs.  We will continue with the current dose of atorvastatin. Dietary changes, exercise, and maintenance of a healthy weight were discussed at length.      Coronary artery disease: Stable based on symptoms.   She is without any worrisome signs or symptoms for unstable angina. Risk factor modification was discussed. She will continue to follow up with cardiology as directed.   Normal Stress Test 3/2/2023.     Paroxysmal SVT: Stable based on symptoms.  She is currently on metoprolol and has been managed by cardiology.  Pt has not experienced any palpitations or tachycardia.     GERD/ Villalpando's esophagus / Hiatal hernia: She underwent a revision of Nissen wrap previously.      OA of left hip: She has persistent but stable pain.   Orthopedics has recommended she undergo a total replacement but she prefers to delay surgery.  She states her symptoms have greatly improved.     Osteopenia: She does meet criteria  for treatment based on her FRAX score.   Pt was treated with Prolia.  12/5/2022: PROLIA administered.  Next PROLIA due June 5, 2023.   9/2023:  HER INSURANCE ([employer]ED [employer] PLAN) DENIED COVERAGE FOR PROLIA.     Major depression:    Stable on Lexapro at this time.  Continue with the same.        Mammogram DUE 6/22/2024  DEXA DUE 6/22/2025

## 2024-04-17 NOTE — PATIENT INSTRUCTIONS
Please discuss the BP with Dr. Pollard as we may need to increase the metoprolol or add something to further address the BP.    We are expecting to start seeing patients at our new location on 5/1/2024.  Our new address will be:  [address] [place] [id number]       Follow up in May as scheduled.    It was a pleasure to see you today. Thank you for choosing us for your health care needs.    If you have lab or other testing completed and have not been informed of results within one week, please call the office for your results.    If you haven't done so, consider signing up for [facility] [employer], the [facility] personal health record. Ask the staff how you can get started.

## 2024-04-25 ENCOUNTER — PATIENT OUTREACH (OUTPATIENT)
Dept: CARE COORDINATION | Facility: CLINIC | Age: 79
End: 2024-04-25
Payer: COMMERCIAL

## 2024-04-25 NOTE — PROGRESS NOTES
Call regarding appt. with PCP on 4/17/2024 after hospitalization.  At time of outreach call the patient feels as if their condition has improved  since last visit.  Reviewed the PCP appointment with the pt and addressed any questions or concerns. Pt states she is feeling well. Denies headaches or blurred vision since office visit. No new medications at this time.

## 2024-05-13 ENCOUNTER — APPOINTMENT (OUTPATIENT)
Dept: PRIMARY CARE | Facility: CLINIC | Age: 79
End: 2024-05-13
Payer: COMMERCIAL

## 2024-05-20 NOTE — PROGRESS NOTES
Subjective   Reason for Visit: Hedy Pete is an 78 y.o. female here for a Medicare Wellness visit, annual physical, and follow up monitoring and management of multiple medical conditions.      Past Medical, Surgical, and Family History reviewed and updated in chart.         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  Has follow up tomorrow.  Headaches are now much better.    No other concerns at this time.    Patient has hypertension.  Pt does not monitor her BP at home.  She denies any CP, SOB, and LE edema.  Patient is compliant with antihypertensive therapy and denies any noted side effects.      Patient has hyperlipidemia.  Patient tries to limit the amount of fatty foods and high cholesterol foods that are consumed.  She is compliant with her atorvastatin. She denies any noted side effects.     Pt has known CAD.  Pt follows with her cardiologist (Dr. Pollard) on a regular basis.   She denies any chest pain or shortness of breath with or without exertional activity.      Patient has hypothyroidism.   She has been compliant with the dosing of her levothyroxine.     She has vitamin D deficiency.  Pt has been compliant with the dosing of her over-the-counter vitamin D supplementation.      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 Stallworth.  She is not on any acid suppressing medications at this time.     Pt has OA of the left hip.  She continues to have some hip pain but she has continued to delay hip replacement which was recommended by orthopedics.   She mostly notes symptoms of LEFT hip pain and restricted motion when she flexes the knee and externally rotates the hip such as putting on socks or shoes.     She has osteopenia.  A FRAX score reveals the need for treatment and she has been on Prolia.  She has tolerated the medication well.    12/5/2022: SHE IS DUE FOR A PROLIA INJECTION.     As previously noted:   Pt had intracranial hemorrhage (left intraventricular hemorrhage) and was hospitalized for 3-4 days at [facility] 4/2022. The bleeding was thought to be a result of her antiplatelet therapy.  She was treated by Dr. Canada (neurology).  She continues to follow with neurology (Dr. Canada) as directed.    Pt was diagnosed with aqueductal stenosis  Pt underwent an endoscopic 3rd ventriculostomy.  Headaches are much better now.       SACHA MENESES, SON       Patient Self Assessment of Health Status  Patient Self Assessment: Fair    Nutrition and Exercise  Current Diet: Well Balanced Diet  Adequate Fluid Intake: Yes  Caffeine: Yes  Exercise Frequency: Infrequently    Functional Ability/Level of Safety  Cognitive Impairment Observed: No cognitive impairment observed    Home Safety Risk Factors: None    Patient Care Team:  Donis Noriega DO as PCP - General (Family Medicine)  Donis Noriega DO as PCP - [facility] PCP  Jairo Pollard MD as Consulting Physician (Cardiology)  Mame Wilkes RN as Care Manager (Case Management)     Review of Systems  Constitutional: Patient denies any fever, chills, loss of appetite, or unexplained weight loss.  Cardiovascular: Patient denies any chest pain, shortness of breath with exertion, tachycardia, palpitations, orthopnea, or paroxysmal nocturnal dyspnea.  Respiratory: Patient denies any cough, shortness breath, or wheezing.  Gastrointestinal: Patient denies any nausea, vomiting, diarrhea, constipation, melena, hematochezia, or reflux symptoms  Skin: Denies any rashes or skin lesions  Neurology: Patient denies any new motor or sensory losses. Denies any numbness, tingling, weakness, and incoordination of the extremities. Patient also denies any tremor, seizures, or gait instability.  Endocrinology: Denies any polyuria, polydipsia, polyphagia, or heat/cold intolerance.      Objective  "  Vitals:  /60   Pulse 66   Temp 36.2 °C (97.2 °F)   Ht 1.575 m (5' 2\")   Wt 51.7 kg (113 lb 14.4 oz)   SpO2 97%   BMI 20.83 kg/m²       Physical Exam  General Appearance: Alert and cooperative, in no acute distress, well-developed/well-nourished.  Neck: Supple and without adenopathy or rigidity. There is no JVD at 90° and no carotid bruits are noted. There is no thyromegaly, thyroid tenderness, or palpable thyroid nodules.  Heart: Regular rate and rhythm without murmur or ectopy.  Lungs: Clear to auscultation bilaterally with good air exchange.  Skin: Good turgor, moist, warm and without rashes or lesions.  Neurological exam: Alert and oriented ×3, no tremor, normal gait.  Extremities: No clubbing, cyanosis, or edema  Head: Normocephalic and atraumatic  Eyes: Conjunctiva and sclera appear normal bilaterally.  Anterior chambers appear clear.  Extraocular muscles are intact.  ENT: Mucous membranes are moist, external auditory canals and tympanic membranes are within normal limits bilaterally.  Pharynx is without erythema or exudate.  There is no noted rhinorrhea.  Lymph:  No noted cervical, clavicular, axillary, or inguinal adenopathy.  Abdomen: Soft, nontender/nondistended.  No masses, guarding, rebound, or rigidity.  Bowel sounds are normal.  No abdominal bruits.  No CVA tenderness.  There is no widening of the aortic pulsation.  Musculoskeletal: No noted joint effusions or gross bony deformity.          Assessment/Plan   MEDICARE ANNUAL WELLNESS EXAM / ANNUAL PHYSICAL: Appropriate screenings for the patient's current age were discussed at length.  I encouraged a low-fat/low-cholesterol diet and routine exercise.          Hypertension:   Blood pressure stable.  Continue the current medications.     Hyperlipidemia: Stable on last labs.  We will continue with the current dose of atorvastatin. Dietary changes, exercise, and maintenance of a healthy weight were discussed at length.      Coronary artery " disease: Stable based on symptoms.   She is without any worrisome signs or symptoms for unstable angina. Risk factor modification was discussed. She will continue to follow up with cardiology as directed.   Normal Stress Test 3/2/2023.     Paroxysmal SVT: Stable based on symptoms.  She is currently on metoprolol and has been managed by cardiology.  Pt has not experienced any palpitations or tachycardia.    Hypothyroidism: Stable on last labs.  We will continue current dose of levothyroxine.  We will continue to monitor.    GERD/ Villalpando's esophagus / Hiatal hernia: She underwent a revision of Nissen wrap previously.      OA of left hip: She has persistent but stable pain.   Orthopedics has recommended she undergo a total replacement but she prefers to delay surgery.  She states her symptoms have greatly improved.     Osteopenia: She does meet criteria for treatment based on her FRAX score.   Pt was treated with Prolia.  12/5/2022: PROLIA administered.  Next PROLIA due June 5, 2023.   9/2023:  HER INSURANCE (Pending sale to [employer] [employer] MEDICARE ADVANTAGE PLAN) DENIED COVERAGE FOR PROLIA.     Major depression:    Stable on Lexapro at this time.  Continue with the same.      Aqueductal stenosis:  Symptoms resolved with endoscopic 3rd ventriculostomy     Vitamin D deficiency:      Mammogram DUE 6/22/2024  DEXA DUE 6/22/2025  MCAW DUE 5/2025    An order was placed for a Mammogram to be done in June.       Scribe Attestation  By signing my name below, IPatricia Scribe   attest that this documentation has been prepared under the direction and in the presence of Donis Noriega DO.      Orders Placed This Encounter   Procedures    BI mammo bilateral screening tomosynthesis    Vitamin D 25-Hydroxy,Total (for eval of Vitamin D levels)

## 2024-05-21 ENCOUNTER — OFFICE VISIT (OUTPATIENT)
Dept: PRIMARY CARE | Facility: CLINIC | Age: 79
End: 2024-05-21
Payer: COMMERCIAL

## 2024-05-21 VITALS
TEMPERATURE: 97.2 F | SYSTOLIC BLOOD PRESSURE: 117 MMHG | DIASTOLIC BLOOD PRESSURE: 60 MMHG | HEIGHT: 62 IN | BODY MASS INDEX: 20.96 KG/M2 | WEIGHT: 113.9 LBS | OXYGEN SATURATION: 97 % | HEART RATE: 66 BPM

## 2024-05-21 DIAGNOSIS — I10 PRIMARY HYPERTENSION: ICD-10-CM

## 2024-05-21 DIAGNOSIS — I25.10 CORONARY ARTERY DISEASE INVOLVING NATIVE CORONARY ARTERY OF NATIVE HEART, UNSPECIFIED WHETHER ANGINA PRESENT: ICD-10-CM

## 2024-05-21 DIAGNOSIS — E55.9 VITAMIN D DEFICIENCY: ICD-10-CM

## 2024-05-21 DIAGNOSIS — Z12.31 ENCOUNTER FOR SCREENING MAMMOGRAM FOR MALIGNANT NEOPLASM OF BREAST: ICD-10-CM

## 2024-05-21 DIAGNOSIS — Q03.0 AQUEDUCTAL STENOSIS (MULTI): ICD-10-CM

## 2024-05-21 DIAGNOSIS — K22.70 BARRETT'S ESOPHAGUS WITHOUT DYSPLASIA: ICD-10-CM

## 2024-05-21 DIAGNOSIS — Z00.00 WELL ADULT EXAM: ICD-10-CM

## 2024-05-21 DIAGNOSIS — E03.9 HYPOTHYROIDISM, UNSPECIFIED TYPE: ICD-10-CM

## 2024-05-21 DIAGNOSIS — M16.12 PRIMARY OSTEOARTHRITIS OF LEFT HIP: ICD-10-CM

## 2024-05-21 DIAGNOSIS — I47.10 PAROXYSMAL SVT (SUPRAVENTRICULAR TACHYCARDIA) (CMS-HCC): ICD-10-CM

## 2024-05-21 DIAGNOSIS — E78.2 MIXED HYPERLIPIDEMIA: ICD-10-CM

## 2024-05-21 DIAGNOSIS — F32.0 CURRENT MILD EPISODE OF MAJOR DEPRESSIVE DISORDER WITHOUT PRIOR EPISODE (CMS-HCC): ICD-10-CM

## 2024-05-21 DIAGNOSIS — K21.00 GASTROESOPHAGEAL REFLUX DISEASE WITH ESOPHAGITIS WITHOUT HEMORRHAGE: ICD-10-CM

## 2024-05-21 DIAGNOSIS — Z00.00 MEDICARE ANNUAL WELLNESS VISIT, SUBSEQUENT: Primary | ICD-10-CM

## 2024-05-21 DIAGNOSIS — M85.80 OSTEOPENIA, UNSPECIFIED LOCATION: ICD-10-CM

## 2024-05-21 PROBLEM — Z86.39 HISTORY OF METABOLIC DISORDER: Status: RESOLVED | Noted: 2024-04-17 | Resolved: 2024-05-21

## 2024-05-21 PROBLEM — Z86.39 HISTORY OF HYPOTHYROIDISM: Status: RESOLVED | Noted: 2024-04-17 | Resolved: 2024-05-21

## 2024-05-21 PROBLEM — Z86.79 HISTORY OF HYPERTENSION: Status: RESOLVED | Noted: 2024-04-17 | Resolved: 2024-05-21

## 2024-05-21 ASSESSMENT — ACTIVITIES OF DAILY LIVING (ADL)
DRESSING: INDEPENDENT
DOING_HOUSEWORK: INDEPENDENT
MANAGING_FINANCES: INDEPENDENT
GROCERY_SHOPPING: INDEPENDENT
BATHING: INDEPENDENT
TAKING_MEDICATION: INDEPENDENT

## 2024-05-21 NOTE — PATIENT INSTRUCTIONS
HAVE FASTING LABS DONE SOON    Follow up in 3 months.    It was a pleasure to see you today. Thank you for choosing us for your health care needs.    If you have lab or other testing completed and have not been informed of results within one week, please call the office for your results.    If you haven't done so, consider signing up for [facility] [employer], the [facility] personal health record. Ask the staff how you can get started.

## 2024-05-29 ENCOUNTER — PATIENT OUTREACH (OUTPATIENT)
Dept: CARE COORDINATION | Facility: CLINIC | Age: 79
End: 2024-05-29
Payer: COMMERCIAL

## 2024-05-31 DIAGNOSIS — F32.0 CURRENT MILD EPISODE OF MAJOR DEPRESSIVE DISORDER WITHOUT PRIOR EPISODE (CMS-HCC): ICD-10-CM

## 2024-06-03 RX ORDER — ESCITALOPRAM OXALATE 10 MG/1
10 TABLET ORAL DAILY
Qty: 90 TABLET | Refills: 0 | OUTPATIENT
Start: 2024-06-03

## 2024-07-03 ENCOUNTER — PATIENT OUTREACH (OUTPATIENT)
Dept: CARE COORDINATION | Facility: CLINIC | Age: 79
End: 2024-07-03
Payer: COMMERCIAL

## 2024-08-15 ENCOUNTER — LAB (OUTPATIENT)
Dept: LAB | Facility: LAB | Age: 79
End: 2024-08-15
Payer: COMMERCIAL

## 2024-08-15 DIAGNOSIS — I25.10 ATHEROSCLEROSIS OF NATIVE CORONARY ARTERY OF NATIVE HEART WITHOUT ANGINA PECTORIS: ICD-10-CM

## 2024-08-15 DIAGNOSIS — E55.9 VITAMIN D DEFICIENCY: ICD-10-CM

## 2024-08-15 DIAGNOSIS — I10 PRIMARY HYPERTENSION: ICD-10-CM

## 2024-08-15 DIAGNOSIS — K21.00 GASTROESOPHAGEAL REFLUX DISEASE WITH ESOPHAGITIS WITHOUT HEMORRHAGE: ICD-10-CM

## 2024-08-15 DIAGNOSIS — I25.10 CORONARY ARTERY DISEASE INVOLVING NATIVE CORONARY ARTERY OF NATIVE HEART, UNSPECIFIED WHETHER ANGINA PRESENT: ICD-10-CM

## 2024-08-15 DIAGNOSIS — E78.2 MIXED HYPERLIPIDEMIA: ICD-10-CM

## 2024-08-15 DIAGNOSIS — E78.49 OTHER HYPERLIPIDEMIA: ICD-10-CM

## 2024-08-15 DIAGNOSIS — E03.9 HYPOTHYROIDISM, UNSPECIFIED TYPE: ICD-10-CM

## 2024-08-15 LAB
25(OH)D3 SERPL-MCNC: 24 NG/ML (ref 30–100)
ALBUMIN SERPL BCP-MCNC: 3.9 G/DL (ref 3.4–5)
ALP SERPL-CCNC: 62 U/L (ref 33–136)
ALT SERPL W P-5'-P-CCNC: 25 U/L (ref 7–45)
ANION GAP SERPL CALC-SCNC: 9 MMOL/L (ref 10–20)
AST SERPL W P-5'-P-CCNC: 27 U/L (ref 9–39)
BILIRUB SERPL-MCNC: 0.8 MG/DL (ref 0–1.2)
BUN SERPL-MCNC: 12 MG/DL (ref 6–23)
CALCIUM SERPL-MCNC: 9.4 MG/DL (ref 8.6–10.3)
CHLORIDE SERPL-SCNC: 104 MMOL/L (ref 98–107)
CHOLEST SERPL-MCNC: 149 MG/DL (ref 0–199)
CHOLESTEROL/HDL RATIO: 2.2
CO2 SERPL-SCNC: 31 MMOL/L (ref 21–32)
CREAT SERPL-MCNC: 0.78 MG/DL (ref 0.5–1.05)
EGFRCR SERPLBLD CKD-EPI 2021: 79 ML/MIN/1.73M*2
GLUCOSE SERPL-MCNC: 87 MG/DL (ref 74–99)
HDLC SERPL-MCNC: 66.3 MG/DL
LDLC SERPL CALC-MCNC: 69 MG/DL
NON HDL CHOLESTEROL: 83 MG/DL (ref 0–149)
POTASSIUM SERPL-SCNC: 4.3 MMOL/L (ref 3.5–5.3)
PROT SERPL-MCNC: 6.3 G/DL (ref 6.4–8.2)
SODIUM SERPL-SCNC: 140 MMOL/L (ref 136–145)
TRIGL SERPL-MCNC: 71 MG/DL (ref 0–149)
TSH SERPL-ACNC: 3.28 MIU/L (ref 0.44–3.98)
VLDL: 14 MG/DL (ref 0–40)

## 2024-08-15 PROCEDURE — 36415 COLL VENOUS BLD VENIPUNCTURE: CPT

## 2024-08-15 PROCEDURE — 82306 VITAMIN D 25 HYDROXY: CPT

## 2024-08-15 PROCEDURE — 84443 ASSAY THYROID STIM HORMONE: CPT

## 2024-08-15 PROCEDURE — 80061 LIPID PANEL: CPT

## 2024-08-15 PROCEDURE — 80053 COMPREHEN METABOLIC PANEL: CPT

## 2024-08-21 ENCOUNTER — APPOINTMENT (OUTPATIENT)
Dept: PRIMARY CARE | Facility: CLINIC | Age: 79
End: 2024-08-21
Payer: COMMERCIAL

## 2024-08-21 VITALS
OXYGEN SATURATION: 98 % | DIASTOLIC BLOOD PRESSURE: 60 MMHG | WEIGHT: 116 LBS | RESPIRATION RATE: 16 BRPM | BODY MASS INDEX: 21.35 KG/M2 | TEMPERATURE: 99 F | HEIGHT: 62 IN | HEART RATE: 60 BPM | SYSTOLIC BLOOD PRESSURE: 114 MMHG

## 2024-08-21 DIAGNOSIS — E78.2 MIXED HYPERLIPIDEMIA: ICD-10-CM

## 2024-08-21 DIAGNOSIS — Q03.0 AQUEDUCTAL STENOSIS (MULTI): ICD-10-CM

## 2024-08-21 DIAGNOSIS — E55.9 VITAMIN D DEFICIENCY: ICD-10-CM

## 2024-08-21 DIAGNOSIS — M85.80 OSTEOPENIA, UNSPECIFIED LOCATION: ICD-10-CM

## 2024-08-21 DIAGNOSIS — I47.10 PAROXYSMAL SVT (SUPRAVENTRICULAR TACHYCARDIA) (CMS-HCC): ICD-10-CM

## 2024-08-21 DIAGNOSIS — M16.12 PRIMARY OSTEOARTHRITIS OF LEFT HIP: ICD-10-CM

## 2024-08-21 DIAGNOSIS — E03.9 HYPOTHYROIDISM, UNSPECIFIED TYPE: ICD-10-CM

## 2024-08-21 DIAGNOSIS — K22.70 BARRETT'S ESOPHAGUS WITHOUT DYSPLASIA: ICD-10-CM

## 2024-08-21 DIAGNOSIS — R05.2 SUBACUTE COUGH: ICD-10-CM

## 2024-08-21 DIAGNOSIS — I10 PRIMARY HYPERTENSION: Primary | ICD-10-CM

## 2024-08-21 DIAGNOSIS — M35.01 KERATOCONJUNCTIVITIS SICCA (MULTI): ICD-10-CM

## 2024-08-21 DIAGNOSIS — K21.00 GASTROESOPHAGEAL REFLUX DISEASE WITH ESOPHAGITIS WITHOUT HEMORRHAGE: ICD-10-CM

## 2024-08-21 DIAGNOSIS — I25.10 CORONARY ARTERY DISEASE INVOLVING NATIVE CORONARY ARTERY OF NATIVE HEART, UNSPECIFIED WHETHER ANGINA PRESENT: ICD-10-CM

## 2024-08-21 PROBLEM — G82.20 PARAPARESIS (MULTI): Status: RESOLVED | Noted: 2023-12-07 | Resolved: 2024-08-21

## 2024-08-21 PROCEDURE — 3074F SYST BP LT 130 MM HG: CPT | Performed by: FAMILY MEDICINE

## 2024-08-21 PROCEDURE — 1123F ACP DISCUSS/DSCN MKR DOCD: CPT | Performed by: FAMILY MEDICINE

## 2024-08-21 PROCEDURE — 1159F MED LIST DOCD IN RCRD: CPT | Performed by: FAMILY MEDICINE

## 2024-08-21 PROCEDURE — 1036F TOBACCO NON-USER: CPT | Performed by: FAMILY MEDICINE

## 2024-08-21 PROCEDURE — G2211 COMPLEX E/M VISIT ADD ON: HCPCS | Performed by: FAMILY MEDICINE

## 2024-08-21 PROCEDURE — 3078F DIAST BP <80 MM HG: CPT | Performed by: FAMILY MEDICINE

## 2024-08-21 PROCEDURE — 1160F RVW MEDS BY RX/DR IN RCRD: CPT | Performed by: FAMILY MEDICINE

## 2024-08-21 PROCEDURE — 99214 OFFICE O/P EST MOD 30 MIN: CPT | Performed by: FAMILY MEDICINE

## 2024-08-21 RX ORDER — BENZONATATE 200 MG/1
200 CAPSULE ORAL 3 TIMES DAILY PRN
Qty: 21 CAPSULE | Refills: 0 | Status: SHIPPED | OUTPATIENT
Start: 2024-08-21 | End: 2024-08-28

## 2024-08-21 RX ORDER — EPINEPHRINE 0.3 MG/.3ML
0.3 INJECTION SUBCUTANEOUS EVERY 5 MIN PRN
OUTPATIENT
Start: 2024-08-28

## 2024-08-21 RX ORDER — DIPHENHYDRAMINE HYDROCHLORIDE 50 MG/ML
50 INJECTION INTRAMUSCULAR; INTRAVENOUS AS NEEDED
OUTPATIENT
Start: 2024-08-28

## 2024-08-21 RX ORDER — LEVOTHYROXINE SODIUM 25 UG/1
25 TABLET ORAL DAILY
Qty: 90 TABLET | Refills: 0 | Status: SHIPPED | OUTPATIENT
Start: 2024-08-21

## 2024-08-21 RX ORDER — ALBUTEROL SULFATE 0.83 MG/ML
3 SOLUTION RESPIRATORY (INHALATION) AS NEEDED
OUTPATIENT
Start: 2024-08-28

## 2024-08-21 RX ORDER — ZOLEDRONIC ACID 5 MG/100ML
5 INJECTION, SOLUTION INTRAVENOUS ONCE
OUTPATIENT
Start: 2024-08-28

## 2024-08-21 RX ORDER — FAMOTIDINE 10 MG/ML
20 INJECTION INTRAVENOUS ONCE AS NEEDED
OUTPATIENT
Start: 2024-08-28

## 2024-08-21 RX ORDER — ZOLEDRONIC ACID 5 MG/100ML
5 INJECTION, SOLUTION INTRAVENOUS ONCE
Qty: 100 ML | Refills: 0 | OUTPATIENT
Start: 2024-08-21 | End: 2024-08-21

## 2024-08-21 NOTE — PROGRESS NOTES
Subjective   Patient ID: Hedy Pete is a 78 y.o. female who presents for Follow-up (Pt here today for 3 month follow) and Med Management (Pt requesting refill of tessalon pearls and levothyroxine).    Labs: 8/15/24  Dexa: due 6/22/25  Mamm: 6/21/23 due  ColoRect: 2/2/2018  AWV: 5/21/24  Steadi: NEG    She states she has been coughing a lot due to the weather change. Uses Tessalon as needed and has requested a refill.    She states she has not been taking her vitamin D supplement.   Neurology had advised to stop after she had an intraventricular hemorrhage. They were concerned that vit D may increase her risk of bleeding.    HPI   Patient has hypertension.  Pt does not monitor her BP at home.  She denies any CP, SOB, and LE edema.  Patient is compliant with antihypertensive therapy and denies any noted side effects.      Patient has hyperlipidemia.  Patient tries to limit the amount of fatty foods and high cholesterol foods that are consumed.  She is compliant with her atorvastatin. She denies any noted side effects.     Pt has known CAD.  Pt follows with her cardiologist (Dr. Pollard) on a regular basis.   She denies any chest pain or shortness of breath with or without exertional activity.      Patient has hypothyroidism.   She has been compliant with the dosing of her levothyroxine.     She has vitamin D deficiency.  Pt has been compliant with the dosing of her over-the-counter vitamin D supplementation.      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 Stallworth.  She is not on any acid suppressing medications at this time.     Pt has OA of the left hip.  She continues to have some hip pain but she has continued to delay hip replacement which was recommended by orthopedics.   She mostly notes symptoms of LEFT hip pain and restricted motion when she flexes the knee and externally rotates the hip such as putting on socks or shoes.     She has  "osteopenia.  A FRAX score reveals the need for treatment and was treated with Prolia in the past but it became too expensive.  Would like to try something less expressive.     As previously noted:   Pt had intracranial hemorrhage (left intraventricular hemorrhage) and was hospitalized for 3-4 days at [facility] 4/2022. The bleeding was thought to be a result of her antiplatelet therapy.  She was treated by Dr. Canada (neurology).  She continues to follow with neurology (Dr. Canada) as directed.     Pt was previously diagnosed with aqueductal stenosis  Pt underwent an endoscopic 3rd ventriculostomy 4/8/2024 at the [facility].  Headaches are much improved.       Review of Systems  Constitutional: Patient denies any fever, chills, loss of appetite, or unexplained weight loss.  Cardiovascular: Patient denies any chest pain, shortness of breath with exertion, tachycardia, palpitations, orthopnea, or paroxysmal nocturnal dyspnea.    Respiratory: Patient denies any shortness breath, or wheezing.  Positive for cough.     Gastrointestinal: Patient denies any nausea, vomiting, diarrhea, constipation, melena, hematochezia, or reflux symptoms  Skin: Denies any rashes or skin lesions  Neurology: Patient denies any new motor or sensory losses. Denies any numbness, tingling, weakness, and incoordination of the extremities. Patient also denies any tremor, seizures, or gait instability.  Endocrinology: Denies any polyuria, polydipsia, polyphagia, or heat/cold intolerance.  Psychiatric: Patient denies any depression, or suicidal/homicidal ideation.       Objective   /60 (BP Location: Left arm, Patient Position: Sitting, BP Cuff Size: Adult)   Resp 16   Ht 1.575 m (5' 2\")   Wt 52.6 kg (116 lb)   SpO2 98%   BMI 21.22 kg/m²     Physical Exam  General Appearance: Alert and cooperative, in no acute distress, well-developed/well-nourished.  Neck: Supple and without adenopathy or rigidity. There is no JVD at 90° and no " carotid bruits are noted. There is no thyromegaly, thyroid tenderness, or palpable thyroid nodules.  Heart: Regular rate and rhythm without murmur or ectopy.  Lungs: Clear to auscultation bilaterally with good air exchange.  Skin: Good turgor, moist, warm and without rashes or lesions.  Neurological exam: Alert and oriented ×3, no tremor, normal gait.  Extremities: No clubbing, cyanosis, or edema  Psychiatric: Appropriate mood and affect, good insight and judgment, no delusions or thought disorders, no suicidal or homicidal ideation    Assessment/Plan   HTN:  Blood pressure appears adequately controlled and we will continue with the current antihypertensive therapy.    Hyperlipidemia:   Stable based on labs.  Patient will continue with the current medication.  Dietary changes, exercise, and maintenance of healthy weight were discussed at length.    Coronary artery disease: Patient is without worrisome signs or symptoms for unstable angina.  Risk factor modification was discussed at length.  Dietary changes, exercise and maintenance of a healthy weight were discussed.  Last stress test 3/2/2023 (normal).    Hypothyroidism:  Stable based on labs.  Lab Results   Component Value Date    TSH 3.28 08/15/2024   We will continue the current dose of levothyroxine    Paroxysmal SVT: Stable based on symptoms.  She is currently on metoprolol and has been managed by cardiology.  Pt has not experienced any palpitations or tachycardia.     GERD/ Villalpando's esophagus / Hiatal hernia: She underwent a revision of Nissen wrap previously.  Denies reflux symptoms.      OA of left hip: She has persistent but stable pain.   Orthopedics has recommended she undergo a total replacement but she prefers to delay surgery as pain is not significant at this time.     Osteopenia: She does meet criteria for treatment based on her FRAX score.   PROLIA approved 2024 8/21/2024: Pt declined PROLIA injection due to the cost.  We discussed alternative  treatments.  I do not think oral bisphosphonates would be a good option for her given her past GI surgery (Nissen wrap) as she would be at risk of esophageal/GI irritation.  We we will instead proceed with a trial of IV Reclast once yearly.   Risks, benefits, and side effects of the medication were discussed at length.  Questions were answered to the patient's satisfaction the patient wishes to proceed with treatment.  Up to date with dental exams.    Aqueductal stenosis:  Symptoms resolved with endoscopic 3rd ventriculostomy 4/2024.     Vitamin D deficiency:    8/15/2024: lab reveals a low vitamin D at 24.     Keratoconjunctivitis sicca:    Stable per patient.  She will continue to follow with her eye care specialist.    Subacute cough:  Will prescribe Tessalon for the cough.  - benzonatate (Tessalon) 200 mg capsule. Take 1 capsule (200 mg) by mouth 3 times a day as needed for cough for up to 7 days. Do not crush or chew. Dispense: 21 capsule; Refill: 0    Mammogram OVERDUE 6/22/2024 (previously ordered)  DEXA DUE 6/22/2025  MCAW DUE 5/2025    Follow up in 3 months.    Scribe Attestation  By signing my name below, I, Bart Byrne   attest that this documentation has been prepared under the direction and in the presence of Donis Noriega DO.    Requested Prescriptions     Signed Prescriptions Disp Refills    levothyroxine (Synthroid, Levoxyl) 25 mcg tablet 90 tablet 0     Sig: Take 1 tablet (25 mcg) by mouth once daily.    benzonatate (Tessalon) 200 mg capsule 21 capsule 0     Sig: Take 1 capsule (200 mg) by mouth 3 times a day as needed for cough for up to 7 days. Do not crush or chew.

## 2024-08-21 NOTE — PATIENT INSTRUCTIONS
Follow up in 3 months.    It was a pleasure to see you today. Thank you for choosing us for your health care needs.    If you have lab or other testing completed and have not been informed of results within one week, please call the office for your results.    If you haven't done so, consider signing up for [facility] [employer]upristinehart, the [facility] personal health record. Ask the staff how you can get started.

## 2024-08-23 ENCOUNTER — APPOINTMENT (OUTPATIENT)
Dept: CARDIOLOGY | Facility: CLINIC | Age: 79
End: 2024-08-23
Payer: COMMERCIAL

## 2024-08-26 ENCOUNTER — HOSPITAL ENCOUNTER (OUTPATIENT)
Dept: RADIOLOGY | Facility: HOSPITAL | Age: 79
Discharge: HOME | End: 2024-08-26
Payer: COMMERCIAL

## 2024-08-26 DIAGNOSIS — Z12.31 ENCOUNTER FOR SCREENING MAMMOGRAM FOR MALIGNANT NEOPLASM OF BREAST: ICD-10-CM

## 2024-08-26 PROCEDURE — 77063 BREAST TOMOSYNTHESIS BI: CPT | Performed by: STUDENT IN AN ORGANIZED HEALTH CARE EDUCATION/TRAINING PROGRAM

## 2024-08-26 PROCEDURE — 77067 SCR MAMMO BI INCL CAD: CPT | Performed by: STUDENT IN AN ORGANIZED HEALTH CARE EDUCATION/TRAINING PROGRAM

## 2024-08-26 PROCEDURE — 77067 SCR MAMMO BI INCL CAD: CPT

## 2024-08-29 PROBLEM — F32.0 CURRENT MILD EPISODE OF MAJOR DEPRESSIVE DISORDER WITHOUT PRIOR EPISODE (CMS-HCC): Status: RESOLVED | Noted: 2023-11-02 | Resolved: 2024-08-29

## 2024-08-29 PROBLEM — R06.02 SOB (SHORTNESS OF BREATH) ON EXERTION: Status: RESOLVED | Noted: 2023-02-04 | Resolved: 2024-08-29

## 2024-08-29 PROBLEM — M62.81 MUSCLE WEAKNESS: Status: RESOLVED | Noted: 2023-12-18 | Resolved: 2024-08-29

## 2024-08-29 PROBLEM — R10.31 RIGHT LOWER QUADRANT ABDOMINAL PAIN: Status: RESOLVED | Noted: 2018-05-11 | Resolved: 2024-08-29

## 2024-08-29 PROBLEM — R50.9 FEVER: Status: RESOLVED | Noted: 2024-04-17 | Resolved: 2024-08-29

## 2024-09-16 ENCOUNTER — APPOINTMENT (OUTPATIENT)
Dept: CARDIOLOGY | Facility: CLINIC | Age: 79
End: 2024-09-16
Payer: COMMERCIAL

## 2024-09-16 VITALS
BODY MASS INDEX: 21.53 KG/M2 | WEIGHT: 117 LBS | HEART RATE: 74 BPM | HEIGHT: 62 IN | SYSTOLIC BLOOD PRESSURE: 120 MMHG | DIASTOLIC BLOOD PRESSURE: 60 MMHG

## 2024-09-16 DIAGNOSIS — E78.49 OTHER HYPERLIPIDEMIA: ICD-10-CM

## 2024-09-16 DIAGNOSIS — I10 PRIMARY HYPERTENSION: ICD-10-CM

## 2024-09-16 DIAGNOSIS — I47.10 PAROXYSMAL SVT (SUPRAVENTRICULAR TACHYCARDIA) (CMS-HCC): ICD-10-CM

## 2024-09-16 DIAGNOSIS — R07.2 PRECORDIAL PAIN: ICD-10-CM

## 2024-09-16 DIAGNOSIS — I25.119 ATHEROSCLEROSIS OF NATIVE CORONARY ARTERY OF NATIVE HEART WITH ANGINA PECTORIS: Primary | ICD-10-CM

## 2024-09-16 DIAGNOSIS — R07.9 CHEST PAIN, UNSPECIFIED TYPE: ICD-10-CM

## 2024-09-16 DIAGNOSIS — K21.00 GASTROESOPHAGEAL REFLUX DISEASE WITH ESOPHAGITIS WITHOUT HEMORRHAGE: ICD-10-CM

## 2024-09-16 DIAGNOSIS — I25.10 ATHEROSCLEROSIS OF NATIVE CORONARY ARTERY OF NATIVE HEART WITHOUT ANGINA PECTORIS: ICD-10-CM

## 2024-09-16 DIAGNOSIS — I25.10 ASHD (ARTERIOSCLEROTIC HEART DISEASE): ICD-10-CM

## 2024-09-16 PROCEDURE — 3078F DIAST BP <80 MM HG: CPT | Performed by: INTERNAL MEDICINE

## 2024-09-16 PROCEDURE — 1036F TOBACCO NON-USER: CPT | Performed by: INTERNAL MEDICINE

## 2024-09-16 PROCEDURE — 1159F MED LIST DOCD IN RCRD: CPT | Performed by: INTERNAL MEDICINE

## 2024-09-16 PROCEDURE — 1123F ACP DISCUSS/DSCN MKR DOCD: CPT | Performed by: INTERNAL MEDICINE

## 2024-09-16 PROCEDURE — 99214 OFFICE O/P EST MOD 30 MIN: CPT | Performed by: INTERNAL MEDICINE

## 2024-09-16 PROCEDURE — 3074F SYST BP LT 130 MM HG: CPT | Performed by: INTERNAL MEDICINE

## 2024-09-16 RX ORDER — ATORVASTATIN CALCIUM 40 MG/1
40 TABLET, FILM COATED ORAL NIGHTLY
Qty: 90 TABLET | Refills: 3 | Status: SHIPPED | OUTPATIENT
Start: 2024-09-16 | End: 2025-09-16

## 2024-09-16 NOTE — PROGRESS NOTES
Patient:  Hedy Pete  YOB: 1945  MRN: [id number]       HPI:       Hedy Pete is a 78 y.o. female who returns today for cardiac follow-up.  She has a history of atherosclerotic heart disease with cardiac catheterization March 2013 showing 65% stenosis of the left anterior descending artery and approximately 70% stenosis of the right coronary artery. Fractional flow reserve was 0.92 and medical therapy was recommended. She has a history of hyperlipidemia. Typically her blood pressures run on the low side. She had some vasovagal episodes in the past.     She was previously experiencing bouts of severe burning discomfort in the epigastrium and lower chest region. She also developed progressive hoarseness and her symptoms were felt to be related to gastroesophageal reflux disease. She was diagnosed with an ulcer and hiatal hernia she underwent surgical repair by Dr. Stallwroth. She had some persistent difficulties and underwent a redo operation. Her swallowing is now much improved. A previous stress echocardiogram on March 13, 2020 was negative for ischemia or infarction. LV ejection fraction was 50 to 55%. Valvular structure and function were normal.  An exercise myocardial perfusion study March 3, 2023 was negative for ischemia or infarction.  Calculated LV ejection fraction 55%.     She was admitted to [facility] in April 2022 after presenting to the emergency room with complaints of a headache x 1 week. CT scan of the head showed small amount of intraventricular nuclear hemorrhage within the occipital horn of the left lateral ventricle. Due to the subarachnoid hemorrhage her aspirin was discontinued per neurology.   At the time of her last visit she noted that she had not been feeling right since the subarachnoid hemorrhage occurred.  She was diagnosed with normal pressure hydrocephalus.  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  Her symptoms initially improved but have since recurred.  She was told she should be considered for a  shunt placement.  She is reluctant to have this done as an acquaintance of hers had previous complications from a similar procedure.    She reports that she had an episode of prolonged chest discomfort postoperatively.  Myocardial infarction was ruled out.  She was told to follow-up with cardiology.  She denies any recurrent shortness of breath.  She denies any orthopnea, PND, or increasing peripheral edema. She denies any palpitations, lightheadedness, near-syncope, or syncope. She denies any fever, chills, or cough. She denies any nausea, vomiting, or diaphoresis. She denies any hemoptysis, hematemesis, melena, or hematochezia.  Lab studies August 15, 2024 showed a normal comprehensive metabolic profile.  TSH was normal.  Cholesterol 149 with HDL 66, LDL 69, and triglycerides 71.  She relates concerned about the prolonged episode of chest discomfort and known coronary disease.  We discussed diagnostic and treatment options.  Will plan to proceed with a follow-up stress myocardial perfusion study in the near future.  Will call her with these results.  Other details as noted below.    The above portion of this note was dictated by me using voice recognition software. I personally performed the services described in the documentation. The scribe entering the documentation below was in my presence. I affirm that the information is both accurate and complete.      Objective:     Vitals:    09/16/24 1330   BP: 120/60   Pulse: 74       Wt Readings from Last 4 Encounters:   08/21/24 52.6 kg (116 lb)   05/21/24 51.7 kg (113 lb 14.4 oz)   04/17/24 50.3 kg (110 lb 14.4 oz)   02/23/24 50.2 kg (110 lb 9.6 oz)       Allergies:     Allergies   Allergen Reactions    Hydromorphone Shortness of breath and Other     Vomitting    Aspirin Unknown    Diltiazem Hcl Swelling     edema    Duloxetine Headache and  Nausea Only    Penicillins Unknown          Medications:     Current Outpatient Medications   Medication Instructions    acetaminophen (TYLENOL) 650 mg, Enteral, Every 4 hours PRN    atorvastatin (LIPITOR) 40 mg, oral, Nightly    ergocalciferol, vitamin D2, (VITAMIN D2 ORAL) 50 mcg, oral, Daily    levothyroxine (SYNTHROID, LEVOXYL) 25 mcg, oral, Daily    metoprolol succinate XL (TOPROL-XL) 25 mg, oral, Daily    nitroglycerin (NITROSTAT) 0.4 mg, sublingual, Every 5 min PRN, PLACE 1 TABLET UNDER THE TONGUE EVERY 5 MINUTES UP TO 3 DOSES AS NEEDED FOR CHEST PAIN.    pantoprazole (PROTONIX) 20 mg, oral, Daily, Do not crush, chew, or split.     sennosides-docusate sodium (Joselin-Colace) 8.6-50 mg tablet 1 tablet, Enteral, Every 12 hours PRN    zoledronic acid (Reclast) 5 mg/100 mL piggyback 5 mg, intravenous, Once       Physical Examination:   GENERAL:  Well developed, well nourished, in no acute distress.  CHEST:  Symmetric and nontender.  NEURO/PSYCH:  Alert and oriented times three with approppriate behavior and responses.  NECK:  Supple, no JVD, no bruit.  LUNGS:  Clear to auscultation bilaterally, normal respiratory effort.  HEART:  Rate and rhythm regular with no evident murmur, no gallop appreciated.        There are no rubs, clicks or heaves.  EXTREMITIES:  Warm with good color, no clubbing or cyanosis.  There is no edema noted.  PERIPHERAL VASCULAR:  Pulses present and equally palpable; 2+ throughout.      Lab:     CBC:   Lab Results   Component Value Date    WBC 7.7 12/07/2023    RBC 4.66 12/07/2023    HGB 14.0 12/07/2023    HCT 41.9 12/07/2023     12/07/2023        CMP:    Lab Results   Component Value Date     08/15/2024    K 4.3 08/15/2024     08/15/2024    CO2 31 08/15/2024    BUN 12 08/15/2024    CREATININE 0.78 08/15/2024    GLUCOSE 87 08/15/2024    CALCIUM 9.4 08/15/2024       Lipid Profile:    Lab Results   Component Value Date    TRIG 71 08/15/2024    HDL 66.3 08/15/2024    LDLCALC 69  08/15/2024       BMP:  Lab Results   Component Value Date     08/15/2024     12/07/2023     08/21/2023     (L) 07/27/2023     02/13/2023    K 4.3 08/15/2024    K 3.9 12/07/2023    K 4.0 08/21/2023    K 4.1 07/27/2023    K 4.2 02/13/2023     08/15/2024     12/07/2023     08/21/2023     07/27/2023     02/13/2023    CO2 31 08/15/2024    CO2 28 12/07/2023    CO2 28 08/21/2023    CO2 29 07/27/2023    CO2 30 02/13/2023    BUN 12 08/15/2024    BUN 15 12/07/2023    BUN 14 08/21/2023    BUN 12 07/27/2023    BUN 13 02/13/2023    CREATININE 0.78 08/15/2024    CREATININE 0.76 12/07/2023    CREATININE 0.66 08/21/2023    CREATININE 0.67 07/27/2023    CREATININE 0.64 02/13/2023       CBC:  Lab Results   Component Value Date    WBC 7.7 12/07/2023    WBC 6.0 08/21/2023    WBC 5.9 02/13/2023    WBC 6.0 05/03/2022    RBC 4.66 12/07/2023    RBC 4.40 08/21/2023    RBC 4.35 02/13/2023    RBC 4.32 05/03/2022    HGB 14.0 12/07/2023    HGB 13.0 08/21/2023    HGB 12.7 02/13/2023    HGB 12.8 05/03/2022    HCT 41.9 12/07/2023    HCT 39.7 08/21/2023    HCT 39.0 02/13/2023    HCT 39.5 05/03/2022    MCV 90 12/07/2023    MCV 90 08/21/2023    MCV 90 02/13/2023    MCV 91 05/03/2022    MCH 30.0 12/07/2023    MCHC 33.4 12/07/2023    MCHC 32.7 08/21/2023    MCHC 32.6 02/13/2023    MCHC 32.4 05/03/2022    RDW 12.8 12/07/2023    RDW 12.8 08/21/2023    RDW 12.7 02/13/2023    RDW 12.1 05/03/2022     12/07/2023     08/21/2023     02/13/2023     05/03/2022       Hepatic Function Panel:    Lab Results   Component Value Date    ALKPHOS 62 08/15/2024    ALT 25 08/15/2024    AST 27 08/15/2024    PROT 6.3 (L) 08/15/2024    BILITOT 0.8 08/15/2024       HgBA1c:    Lab Results   Component Value Date    HGBA1C 5.8 04/24/2022       TSH:    Lab Results   Component Value Date    TSH 3.28 08/15/2024       Problem List:     Patient Active Problem List   Diagnosis    Abnormal results of  cardiovascular function studies    Arcus senilis, bilateral    Atherosclerosis of native coronary artery without angina pectoris    Bilateral pseudophakia    Central corneal opacity of left eye    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    CAD (coronary artery disease)    Dense breasts    Dysphagia    Dysphonia    Early stage nonexudative age-related macular degeneration of both eyes    Elevated erythrocyte sedimentation rate    Epiretinal membrane (ERM) of both eyes    Excessive tear production of both lacrimal glands    Fecal retention    Gastritis    GERD (gastroesophageal reflux disease)    Headache, tension-type    Hiatal hernia with GERD    HLD (hyperlipidemia)    Hoarse voice quality    HTN (hypertension)    Hypothyroidism    Intestinal metaplasia of gastric mucosa    Laryngopharyngeal reflux (LPR)    Osteopenia    Ovarian failure    PAC (premature atrial contraction)    Paroxysmal SVT (supraventricular tachycardia) (CMS-[facility])    PCO (posterior capsular opacification), bilateral    Periumbilical abdominal pain    PVC (premature ventricular contraction)    PVD (posterior vitreous detachment), both eyes    Sinus tachycardia    Vasovagal near syncope    Vitamin D deficiency    Tension type headache    Primary osteoarthritis of left hip    Senile cataract    Balance disorder    Heartburn    Keratoconjunctivitis sicca (Multi)    Keratitis    Muscle pain    Nontraumatic subcortical hemorrhage of cerebral hemisphere (Multi)    Obstructive hydrocephalus (Multi)    Pneumonia    Postprocedural state    Aqueductal stenosis (Multi)       Asessment:     Problem List Items Addressed This Visit             ICD-10-CM    Atherosclerosis of native coronary artery of native heart with angina pectoris (CMS-HCC) - Primary I25.119    GERD (gastroesophageal reflux disease) K21.9    Relevant Orders    Follow Up In Cardiology    HLD (hyperlipidemia) E78.5    Relevant Medications    atorvastatin (Lipitor) 40 mg tablet    Other Relevant  Orders    Follow Up In Cardiology    Lipid Panel    Comprehensive Metabolic Panel    HTN (hypertension) I10    Paroxysmal SVT (supraventricular tachycardia) (CMS-HCC) I47.10    Precordial pain R07.2     Other Visit Diagnoses         Codes    Chest pain, unspecified type     R07.9    Relevant Orders    Follow Up In Cardiology    Nuclear Stress Test    ASHD (arteriosclerotic heart disease)     I25.10    Relevant Orders    Follow Up In Cardiology    Nuclear Stress Test    Comprehensive Metabolic Panel

## 2024-09-23 PROBLEM — F32.0 CURRENT MILD EPISODE OF MAJOR DEPRESSIVE DISORDER WITHOUT PRIOR EPISODE (HCC): Status: ACTIVE | Noted: 2023-11-02

## 2024-09-26 ENCOUNTER — OFFICE VISIT (OUTPATIENT)
Dept: NEUROLOGY | Age: 79
End: 2024-09-26
Payer: COMMERCIAL

## 2024-09-26 VITALS
HEART RATE: 80 BPM | DIASTOLIC BLOOD PRESSURE: 68 MMHG | BODY MASS INDEX: 21.58 KG/M2 | WEIGHT: 118 LBS | SYSTOLIC BLOOD PRESSURE: 130 MMHG

## 2024-09-26 DIAGNOSIS — G44.201 ACUTE INTRACTABLE TENSION-TYPE HEADACHE: ICD-10-CM

## 2024-09-26 DIAGNOSIS — I61.5 INTRAVENTRICULAR HEMORRHAGE, NONTRAUMATIC (HCC): Primary | ICD-10-CM

## 2024-09-26 DIAGNOSIS — Q03.0 AQUEDUCTAL STENOSIS (HCC): ICD-10-CM

## 2024-09-26 DIAGNOSIS — G91.1 OBSTRUCTIVE HYDROCEPHALUS (HCC): ICD-10-CM

## 2024-09-26 PROCEDURE — 3075F SYST BP GE 130 - 139MM HG: CPT | Performed by: PSYCHIATRY & NEUROLOGY

## 2024-09-26 PROCEDURE — 3078F DIAST BP <80 MM HG: CPT | Performed by: PSYCHIATRY & NEUROLOGY

## 2024-09-26 PROCEDURE — 99214 OFFICE O/P EST MOD 30 MIN: CPT | Performed by: PSYCHIATRY & NEUROLOGY

## 2024-09-26 PROCEDURE — 1123F ACP DISCUSS/DSCN MKR DOCD: CPT | Performed by: PSYCHIATRY & NEUROLOGY

## 2024-09-26 RX ORDER — BUTALBITAL, ACETAMINOPHEN AND CAFFEINE 50; 325; 40 MG/1; MG/1; MG/1
TABLET ORAL
Qty: 30 TABLET | Refills: 0 | Status: SHIPPED | OUTPATIENT
Start: 2024-09-26

## 2024-09-26 NOTE — PROGRESS NOTES
Subjective:      Patient ID: Dottie Corcoran is a 78 y.o. female who presents today for:  Chief Complaint   Patient presents with    6 Month Follow-Up     Pt states that since last visit she did have a procedure to drain the fluid around her brain. Pt states that they spoke about a possible shunt, but the pt does not want.        HPI 78 right-handed female with history of intracerebral hemorrhage with she is nontraumatic.  Patient also has associated acute ductal stenosis.  She then developed a ventricular dilatation with opening pressure of 17 with a closing pressure of 9, patient was then referred to Dr. Carrillo.  When last seen she was due to have some percutaneous procedure and she actually did in April.  She had a right sided endoscopic ventriculostomy.  Patient actually did is having more symptoms now is likely that this may be recurring.  She has an appointment to see neurosurgery at end of the month    Patient reports pressure headache likely mostly of a vascular headache and sometimes worse.  Patient denies any incontinence or gait changes  No past medical history on file.  No past surgical history on file.  Social History     Socioeconomic History    Marital status: Single     Spouse name: Not on file    Number of children: Not on file    Years of education: Not on file    Highest education level: Not on file   Occupational History    Not on file   Tobacco Use    Smoking status: Never    Smokeless tobacco: Never   Substance and Sexual Activity    Alcohol use: Not on file    Drug use: Not on file    Sexual activity: Not on file   Other Topics Concern    Not on file   Social History Narrative    Not on file     Social Determinants of Health     Financial Resource Strain: Not on file   Food Insecurity: Not on file   Transportation Needs: Not on file   Physical Activity: Not on file   Stress: Not on file   Social Connections: Not on file   Intimate Partner Violence: Not on file   Housing Stability: Not on

## 2024-10-01 ENCOUNTER — ANCILLARY PROCEDURE (OUTPATIENT)
Dept: CARDIOLOGY | Facility: HOSPITAL | Age: 79
End: 2024-10-01
Payer: COMMERCIAL

## 2024-10-01 ENCOUNTER — TELEPHONE (OUTPATIENT)
Dept: PRIMARY CARE | Facility: CLINIC | Age: 79
End: 2024-10-01

## 2024-10-01 DIAGNOSIS — I25.10 ASHD (ARTERIOSCLEROTIC HEART DISEASE): ICD-10-CM

## 2024-10-01 DIAGNOSIS — R07.9 CHEST PAIN, UNSPECIFIED TYPE: ICD-10-CM

## 2024-10-01 PROCEDURE — 93016 CV STRESS TEST SUPVJ ONLY: CPT | Performed by: INTERNAL MEDICINE

## 2024-10-01 PROCEDURE — 78452 HT MUSCLE IMAGE SPECT MULT: CPT

## 2024-10-01 PROCEDURE — 93018 CV STRESS TEST I&R ONLY: CPT | Performed by: INTERNAL MEDICINE

## 2024-10-01 PROCEDURE — 78452 HT MUSCLE IMAGE SPECT MULT: CPT | Performed by: INTERNAL MEDICINE

## 2024-11-21 ENCOUNTER — APPOINTMENT (OUTPATIENT)
Dept: PRIMARY CARE | Facility: CLINIC | Age: 79
End: 2024-11-21
Payer: COMMERCIAL

## 2024-11-21 VITALS
TEMPERATURE: 98.2 F | HEIGHT: 62 IN | DIASTOLIC BLOOD PRESSURE: 60 MMHG | OXYGEN SATURATION: 98 % | BODY MASS INDEX: 21.57 KG/M2 | WEIGHT: 117.2 LBS | SYSTOLIC BLOOD PRESSURE: 136 MMHG | HEART RATE: 71 BPM

## 2024-11-21 DIAGNOSIS — I83.90 VARICOSE VEINS OF ANKLE: ICD-10-CM

## 2024-11-21 DIAGNOSIS — K22.70 BARRETT'S ESOPHAGUS WITHOUT DYSPLASIA: ICD-10-CM

## 2024-11-21 DIAGNOSIS — K21.00 GASTROESOPHAGEAL REFLUX DISEASE WITH ESOPHAGITIS WITHOUT HEMORRHAGE: ICD-10-CM

## 2024-11-21 DIAGNOSIS — Q03.0 AQUEDUCTAL STENOSIS (MULTI): ICD-10-CM

## 2024-11-21 DIAGNOSIS — Z23 NEED FOR VACCINATION: ICD-10-CM

## 2024-11-21 DIAGNOSIS — R05.2 SUBACUTE COUGH: ICD-10-CM

## 2024-11-21 DIAGNOSIS — M16.12 PRIMARY OSTEOARTHRITIS OF LEFT HIP: ICD-10-CM

## 2024-11-21 DIAGNOSIS — E03.9 HYPOTHYROIDISM, UNSPECIFIED TYPE: ICD-10-CM

## 2024-11-21 DIAGNOSIS — E55.9 VITAMIN D DEFICIENCY: ICD-10-CM

## 2024-11-21 DIAGNOSIS — E78.2 MIXED HYPERLIPIDEMIA: ICD-10-CM

## 2024-11-21 DIAGNOSIS — I25.10 CORONARY ARTERY DISEASE INVOLVING NATIVE CORONARY ARTERY OF NATIVE HEART, UNSPECIFIED WHETHER ANGINA PRESENT: ICD-10-CM

## 2024-11-21 DIAGNOSIS — I47.10 PAROXYSMAL SVT (SUPRAVENTRICULAR TACHYCARDIA) (CMS-HCC): ICD-10-CM

## 2024-11-21 DIAGNOSIS — I10 PRIMARY HYPERTENSION: Primary | ICD-10-CM

## 2024-11-21 DIAGNOSIS — M85.80 OSTEOPENIA, UNSPECIFIED LOCATION: ICD-10-CM

## 2024-11-21 PROCEDURE — G2211 COMPLEX E/M VISIT ADD ON: HCPCS | Performed by: FAMILY MEDICINE

## 2024-11-21 PROCEDURE — 3075F SYST BP GE 130 - 139MM HG: CPT | Performed by: FAMILY MEDICINE

## 2024-11-21 PROCEDURE — 1159F MED LIST DOCD IN RCRD: CPT | Performed by: FAMILY MEDICINE

## 2024-11-21 PROCEDURE — 1160F RVW MEDS BY RX/DR IN RCRD: CPT | Performed by: FAMILY MEDICINE

## 2024-11-21 PROCEDURE — 99214 OFFICE O/P EST MOD 30 MIN: CPT | Performed by: FAMILY MEDICINE

## 2024-11-21 PROCEDURE — G0008 ADMIN INFLUENZA VIRUS VAC: HCPCS | Performed by: FAMILY MEDICINE

## 2024-11-21 PROCEDURE — 90662 IIV NO PRSV INCREASED AG IM: CPT | Performed by: FAMILY MEDICINE

## 2024-11-21 PROCEDURE — 1123F ACP DISCUSS/DSCN MKR DOCD: CPT | Performed by: FAMILY MEDICINE

## 2024-11-21 PROCEDURE — 3078F DIAST BP <80 MM HG: CPT | Performed by: FAMILY MEDICINE

## 2024-11-21 RX ORDER — BENZONATATE 200 MG/1
200 CAPSULE ORAL 3 TIMES DAILY PRN
Qty: 30 CAPSULE | Refills: 0 | Status: SHIPPED | OUTPATIENT
Start: 2024-11-21

## 2024-11-21 NOTE — PATIENT INSTRUCTIONS
RSV vaccine recommended.  Need to get at your local pharmacy.    We will order the Reclast as we discussed.  Call us if not contacted by the infusion center to schedule the IV treatment.      Follow up in 3 months with labs to be done PRIOR.    It was a pleasure to see you today. Thank you for choosing us for your health care needs.    If you have lab or other testing completed and have not been informed of results within one week, please call the office for your results.    If you haven't done so, consider signing up for [facility], [facility]. Ask the staff how you can get started.

## 2024-11-21 NOTE — PROGRESS NOTES
Subjective   Patient ID: Hedy Pete is a 79 y.o. female who presents for Follow-up.    HPI     Pt states her headaches (due to hydrocephalus) come and go and she takes tylenol for the pain.     Pt states she is having lower right leg pain that she thinks is possibly related to her varicose veins.     Would like a refill of the tessalon to use as needed for cough.    Pt would like a flu vaccine today.   Pt declines COVID vaccine today.    No recent BW  Mammo: 08/2024  DEXA: 2023  Colonoscopy: 02/2018        Patient has hypertension.  Pt does not monitor her BP at home.  She denies any CP, SOB, and LE edema.  Patient is compliant with antihypertensive therapy and denies any noted side effects.      Patient has hyperlipidemia.  Patient tries to limit the amount of fatty foods and high cholesterol foods that are consumed.  She is compliant with her atorvastatin. She denies any noted side effects.     Pt has known CAD.  Pt follows with her cardiologist (Dr. Pollard) on a regular basis.   She denies any chest pain or shortness of breath with or without exertional activity.      Patient has hypothyroidism.   She has been compliant with the dosing of her levothyroxine.     She has vitamin D deficiency.  Pt has been compliant with the dosing of her over-the-counter vitamin D supplementation.      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 Stallworth.  She is not on any acid suppressing medications at this time.     Pt has OA of the left hip.  She continues to have some hip pain but she has continued to delay hip replacement which was recommended by orthopedics.   She mostly notes symptoms of LEFT hip pain and restricted motion when she flexes the knee and externally rotates the hip such as putting on socks or shoes.     She has osteopenia.  A FRAX score reveals the need for treatment and was treated with Prolia in the past but it became too expensive.  Would  "like to try something less expressive.     As previously noted:   Pt had intracranial hemorrhage (left intraventricular hemorrhage) and was hospitalized for 3-4 days at [facility] 4/2022. The bleeding was thought to be a result of her antiplatelet therapy.  She was treated by Dr. Canada (neurology).  She continues to follow with neurology (Dr. Canada) as directed.     Pt was previously diagnosed with aqueductal stenosis  Pt underwent an endoscopic 3rd ventriculostomy 4/8/2024 at the [facility].  Headaches continue to be much improved.       Review of Systems  Constitutional: Patient denies any fever, chills, loss of appetite, or unexplained weight loss.  Cardiovascular: Patient denies any chest pain, shortness of breath with exertion, tachycardia, palpitations, orthopnea, or paroxysmal nocturnal dyspnea.  Respiratory: Patient denies any cough, shortness breath, or wheezing.  Gastrointestinal: Patient denies any nausea, vomiting, diarrhea, constipation, melena, hematochezia, or reflux symptoms  Skin: Denies any rashes or skin lesions  Neurology: Patient denies any new motor or sensory losses. Denies any numbness, tingling, weakness, and incoordination of the extremities. Patient also denies any tremor, seizures, or gait instability.  Endocrinology: Denies any polyuria, polydipsia, polyphagia, or heat/cold intolerance.      Objective   /60   Pulse 71   Temp 36.8 °C (98.2 °F) (Temporal)   Ht 1.575 m (5' 2\")   Wt 53.2 kg (117 lb 3.2 oz)   SpO2 98%   BMI 21.44 kg/m²     Physical Exam  General Appearance: Alert and cooperative, in no acute distress, well-developed/well-nourished.  Neck: Supple and without adenopathy or rigidity. There is no JVD at 90° and no carotid bruits are noted. There is no thyromegaly, thyroid tenderness, or palpable thyroid nodules.  Heart: Regular rate and rhythm without murmur or ectopy.  Lungs: Clear to auscultation bilaterally with good air exchange.  Skin: Good turgor, " moist, warm and without rashes or lesions.  Neurological exam: Alert and oriented ×3, no tremor, normal gait.  Extremities: No clubbing, cyanosis, or edema    VASC: Noted mildly tender varicosities to the medial aspect of the right ankle.      Assessment/Plan     HTN:    Stable on in office readings.  Blood pressure appears adequately controlled and we will continue with the current antihypertensive therapy.    Hyperlipidemia:   Stable based on labs.  Patient will continue with the current medication.  Dietary changes, exercise, and maintenance of healthy weight were discussed at length.  Lab Results   Component Value Date    CHOL 149 08/15/2024    HDL 66.3 08/15/2024    LDLCALC 69 08/15/2024    TRIG 71 08/15/2024    CHHDL 2.2 08/15/2024    VLDL 14 08/15/2024    NHDL 83 08/15/2024         Coronary artery disease: Patient is without worrisome signs or symptoms for unstable angina.  Risk factor modification was discussed at length.  Dietary changes, exercise and maintenance of a healthy weight were discussed.  Last stress test 3/2/2023 (normal).    Hypothyroidism:  Lab Results   Component Value Date    TSH 3.28 08/15/2024   Stable based on labs.  We will continue the current dose of levothyroxine  Will recheck TSH on her next labs.     Paroxysmal SVT:   Stable based on symptoms.  She is currently on metoprolol and has been managed by cardiology.  Pt has not experienced any palpitations or tachycardia.     GERD/ Villalpando's esophagus / Hiatal hernia:   She underwent a revision of Nissen wrap previously.  Denies reflux symptoms.      OA of left hip: She has persistent but stable pain.   Orthopedics has recommended she undergo a total replacement but she prefers to delay surgery as pain does not keep her from performing her daily activities.     Osteopenia:   She does meet criteria for treatment based on her FRAX score.   PROLIA approved 2024 8/21/2024: Pt declined PROLIA injection due to the cost.  We discussed  alternative treatments.  I do not think oral bisphosphonates would be a good option for her given her past GI surgery (Nissen wrap) as she would be at risk of esophageal/GI irritation.  We decided to proceed with a trial of IV Reclast once yearly.  11/21/2024: Reclast was also going to be too expensive for her at $300.     Aqueductal stenosis:  Symptoms resolved with endoscopic 3rd ventriculostomy 4/2024.  Headaches remain markedly improved.     Vitamin D deficiency:    8/15/2024: lab reveals a low vitamin D at 24.  Patient was encouraged to take her vitamin D supplementation as directed.    Subacute cough:  Will continue as needed Tessalon for the cough.    Need for vaccination:  Flu vaccine administered today.  RSV vaccine recommended.    Varicose veins of ankle:  Will refer her to Vascular Medicine for the varicose veins in her right leg.       MAMMOGRAM DUE 8/27/2025  DEXA DUE 8/27/2025  MCAW DUE 5/2025    Follow up in 3 months.      Scribe Attestation  By signing my name below, I, Bart Byrne   attest that this documentation has been prepared under the direction and in the presence of Donis Noriega DO.    Orders Placed This Encounter   Procedures    Flu vaccine, trivalent, preservative free, HIGH-DOSE, age 65y+ (Fluzone)    Vitamin D 25-Hydroxy,Total (for eval of Vitamin D levels)    TSH with reflex to Free T4 if abnormal    Lipid Panel    Referral to Vascular Medicine     Requested Prescriptions     Signed Prescriptions Disp Refills    benzonatate (Tessalon) 200 mg capsule 30 capsule 0     Sig: Take 1 capsule (200 mg) by mouth 3 times a day as needed for cough. Do not crush or chew.

## 2025-02-17 ENCOUNTER — APPOINTMENT (OUTPATIENT)
Dept: PRIMARY CARE | Facility: CLINIC | Age: 80
End: 2025-02-17
Payer: COMMERCIAL

## 2025-03-10 ENCOUNTER — APPOINTMENT (OUTPATIENT)
Dept: CARDIOLOGY | Facility: CLINIC | Age: 80
End: 2025-03-10
Payer: COMMERCIAL

## 2025-03-10 VITALS
BODY MASS INDEX: 20.28 KG/M2 | WEIGHT: 110.2 LBS | HEIGHT: 62 IN | HEART RATE: 68 BPM | DIASTOLIC BLOOD PRESSURE: 52 MMHG | SYSTOLIC BLOOD PRESSURE: 110 MMHG

## 2025-03-10 DIAGNOSIS — I25.10 ATHEROSCLEROSIS OF NATIVE CORONARY ARTERY OF NATIVE HEART WITHOUT ANGINA PECTORIS: ICD-10-CM

## 2025-03-10 DIAGNOSIS — E78.49 OTHER HYPERLIPIDEMIA: ICD-10-CM

## 2025-03-10 DIAGNOSIS — K21.00 GASTROESOPHAGEAL REFLUX DISEASE WITH ESOPHAGITIS WITHOUT HEMORRHAGE: ICD-10-CM

## 2025-03-10 PROCEDURE — 3074F SYST BP LT 130 MM HG: CPT | Performed by: INTERNAL MEDICINE

## 2025-03-10 PROCEDURE — 1123F ACP DISCUSS/DSCN MKR DOCD: CPT | Performed by: INTERNAL MEDICINE

## 2025-03-10 PROCEDURE — 3078F DIAST BP <80 MM HG: CPT | Performed by: INTERNAL MEDICINE

## 2025-03-10 PROCEDURE — 99214 OFFICE O/P EST MOD 30 MIN: CPT | Performed by: INTERNAL MEDICINE

## 2025-03-10 PROCEDURE — 1159F MED LIST DOCD IN RCRD: CPT | Performed by: INTERNAL MEDICINE

## 2025-03-10 NOTE — PROGRESS NOTES
Patient:  Hedy Pete  YOB: 1945  MRN: [id number]       HPI:       Hedy Pete is a 79 y.o. female who returns today for cardiac follow-up.  She has a history of atherosclerotic heart disease with cardiac catheterization March 2013 showing 65% stenosis of the left anterior descending artery and approximately 70% stenosis of the right coronary artery. Fractional flow reserve was 0.92 and medical therapy was recommended. She has a history of hyperlipidemia. Typically her blood pressures run on the low side. She had some vasovagal episodes in the past.    She was previously experiencing bouts of severe burning discomfort in the epigastrium and lower chest region. She also developed progressive hoarseness and her symptoms were felt to be related to gastroesophageal reflux disease. She was diagnosed with an ulcer and hiatal hernia she underwent surgical repair by Dr. Stallworth. She had some persistent difficulties and underwent a redo operation. Her swallowing is now much improved. A previous stress echocardiogram on March 13, 2020 was negative for ischemia or infarction. LV ejection fraction was 50 to 55%. Valvular structure and function were normal.  An exercise myocardial perfusion study March 3, 2023 was negative for ischemia or infarction.  Calculated LV ejection fraction 55%.    She was admitted to [facility] in April 2022 after presenting to the emergency room with complaints of a headache x 1 week. CT scan of the head showed small amount of intraventricular nuclear hemorrhage within the occipital horn of the left lateral ventricle. Due to the subarachnoid hemorrhage her aspirin was discontinued per neurology.   At the time of her last visit she noted that she had not been feeling right since the subarachnoid hemorrhage occurred.  She was diagnosed with normal pressure hydrocephalus.  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  Her symptoms initially improved but have since recurred.  She was told she should be considered for a  shunt placement.  She is reluctant to have this done as an acquaintance of hers had previous complications from a similar procedure.    She reported an episode of prolonged chest discomfort postoperatively.  Myocardial infarction was ruled out.  At the time of her follow-up visit she denied any recurrent chest discomfort.  No shortness of breath.  Exercise myocardial perfusion study on October 1, 2024 was negative for ischemia or infarction.  Calculated LV ejection fraction 55%.  EKG response was abnormal and likely represented a false positive result.  She achieved 7 METS of exercise.  No exertion related chest pain or perfusion abnormality.  Peak blood pressure 190/80 mmHg.  MRI November 18, 2024 showed interval right frontal florian hole as opposed for third ventriculostomy.  Biphasic CSF flow was noted.       She generally has been doing well.  She denies any chest pain.  No shortness of breath.  She denies any orthopnea, PND, or increasing peripheral edema. She denies any palpitations, lightheadedness, near-syncope, or syncope. She denies any fever, chills, or cough. She denies any nausea, vomiting, or diaphoresis. She denies any hemoptysis, hematemesis, melena, or hematochezia.  Lab studies August 15, 2024 showed a normal comprehensive metabolic profile.  TSH was normal.  Cholesterol 149 with HDL 66, LDL 69, and triglycerides 71.  Continue atorvastatin for hyperlipidemia.  Aspirin 81 mg daily.  Other details as noted below.    The above portion of this note was dictated by me using voice recognition software. I personally performed the services described in the documentation. The scribe entering the documentation below was in my presence. I affirm that the information is both accurate and complete.      Objective:     Vitals:    03/10/25 1310   BP: 110/52   Pulse: 68       Wt Readings  from Last 4 Encounters:   11/21/24 53.2 kg (117 lb 3.2 oz)   09/16/24 53.1 kg (117 lb)   08/21/24 52.6 kg (116 lb)   05/21/24 51.7 kg (113 lb 14.4 oz)       Allergies:     Allergies   Allergen Reactions    Hydromorphone Shortness of breath and Other     Vomitting    Aspirin Unknown    Diltiazem Hcl Swelling     edema    Duloxetine Headache and Nausea Only    Penicillins Unknown          Medications:     Current Outpatient Medications   Medication Instructions    atorvastatin (LIPITOR) 40 mg, oral, Nightly    benzonatate (TESSALON) 200 mg, oral, 3 times daily PRN, Do not crush or chew.    levothyroxine (SYNTHROID, LEVOXYL) 25 mcg, oral, Daily    nitroglycerin (NITROSTAT) 0.4 mg, Every 5 min PRN       Physical Examination:   GENERAL:  Well developed, well nourished, in no acute distress.  CHEST:  Symmetric and nontender.  NEURO/PSYCH:  Alert and oriented times three with approppriate behavior and responses.  NECK:  Supple, no JVD, no bruit.  LUNGS:  Clear to auscultation bilaterally, normal respiratory effort.  HEART:  Rate and rhythm regular with no evident murmur, no gallop appreciated.        There are no rubs, clicks or heaves.  EXTREMITIES:  Warm with good color, no clubbing or cyanosis.  There is no edema noted.  PERIPHERAL VASCULAR:  Pulses present and equally palpable; 2+ throughout.      Lab:     CBC:   Lab Results   Component Value Date    WBC 7.7 12/07/2023    RBC 4.66 12/07/2023    HGB 14.0 12/07/2023    HCT 41.9 12/07/2023     12/07/2023        CMP:    Lab Results   Component Value Date     08/15/2024    K 4.3 08/15/2024     08/15/2024    CO2 31 08/15/2024    BUN 12 08/15/2024    CREATININE 0.78 08/15/2024    GLUCOSE 87 08/15/2024    CALCIUM 9.4 08/15/2024       Lipid Profile:    Lab Results   Component Value Date    TRIG 71 08/15/2024    HDL 66.3 08/15/2024    LDLCALC 69 08/15/2024       BMP:  Lab Results   Component Value Date     08/15/2024     12/07/2023      08/21/2023     (L) 07/27/2023     02/13/2023    K 4.3 08/15/2024    K 3.9 12/07/2023    K 4.0 08/21/2023    K 4.1 07/27/2023    K 4.2 02/13/2023     08/15/2024     12/07/2023     08/21/2023     07/27/2023     02/13/2023    CO2 31 08/15/2024    CO2 28 12/07/2023    CO2 28 08/21/2023    CO2 29 07/27/2023    CO2 30 02/13/2023    BUN 12 08/15/2024    BUN 15 12/07/2023    BUN 14 08/21/2023    BUN 12 07/27/2023    BUN 13 02/13/2023    CREATININE 0.78 08/15/2024    CREATININE 0.76 12/07/2023    CREATININE 0.66 08/21/2023    CREATININE 0.67 07/27/2023    CREATININE 0.64 02/13/2023       CBC:  Lab Results   Component Value Date    WBC 7.7 12/07/2023    WBC 6.0 08/21/2023    WBC 5.9 02/13/2023    WBC 6.0 05/03/2022    RBC 4.66 12/07/2023    RBC 4.40 08/21/2023    RBC 4.35 02/13/2023    RBC 4.32 05/03/2022    HGB 14.0 12/07/2023    HGB 13.0 08/21/2023    HGB 12.7 02/13/2023    HGB 12.8 05/03/2022    HCT 41.9 12/07/2023    HCT 39.7 08/21/2023    HCT 39.0 02/13/2023    HCT 39.5 05/03/2022    MCV 90 12/07/2023    MCV 90 08/21/2023    MCV 90 02/13/2023    MCV 91 05/03/2022    MCH 30.0 12/07/2023    MCHC 33.4 12/07/2023    MCHC 32.7 08/21/2023    MCHC 32.6 02/13/2023    MCHC 32.4 05/03/2022    RDW 12.8 12/07/2023    RDW 12.8 08/21/2023    RDW 12.7 02/13/2023    RDW 12.1 05/03/2022     12/07/2023     08/21/2023     02/13/2023     05/03/2022       Hepatic Function Panel:    Lab Results   Component Value Date    ALKPHOS 62 08/15/2024    ALT 25 08/15/2024    AST 27 08/15/2024    PROT 6.3 (L) 08/15/2024    BILITOT 0.8 08/15/2024       HgBA1c:    Lab Results   Component Value Date    HGBA1C 5.8 04/24/2022       TSH:    Lab Results   Component Value Date    TSH 3.28 08/15/2024     Problem List:     Patient Active Problem List   Diagnosis    Abnormal results of cardiovascular function studies    Arcus senilis, bilateral    Atherosclerosis of native coronary artery of  native heart with angina pectoris    Bilateral pseudophakia    Central corneal opacity of left eye    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    CAD (coronary artery disease)    Dense breasts    Dysphagia    Dysphonia    Early stage nonexudative age-related macular degeneration of both eyes    Elevated erythrocyte sedimentation rate    Epiretinal membrane (ERM) of both eyes    Excessive tear production of both lacrimal glands    Fecal retention    Gastritis    GERD (gastroesophageal reflux disease)    Headache, tension-type    Hiatal hernia with GERD    HLD (hyperlipidemia)    Hoarse voice quality    HTN (hypertension)    Hypothyroidism    Intestinal metaplasia of gastric mucosa    Laryngopharyngeal reflux (LPR)    Osteopenia    Ovarian failure    PAC (premature atrial contraction)    Paroxysmal SVT (supraventricular tachycardia) (CMS-HCC)    PCO (posterior capsular opacification), bilateral    Periumbilical abdominal pain    PVC (premature ventricular contraction)    PVD (posterior vitreous detachment), both eyes    Sinus tachycardia    Vasovagal near syncope    Vitamin D deficiency    Tension type headache    Primary osteoarthritis of left hip    Senile cataract    Balance disorder    Heartburn    Keratoconjunctivitis sicca    Keratitis    Muscle pain    Nontraumatic subcortical hemorrhage of cerebral hemisphere (Multi)    Obstructive hydrocephalus (Multi)    Pneumonia    Postprocedural state    Aqueductal stenosis (Multi)    Precordial pain       Asessment:     Problem List Items Addressed This Visit             ICD-10-CM    Atherosclerosis of native coronary artery of native heart without angina pectoris I25.10    Relevant Orders    Comprehensive metabolic panel    Lipid panel    Follow Up In Cardiology    GERD (gastroesophageal reflux disease) K21.9    Relevant Orders    Comprehensive metabolic panel    Lipid panel    Follow Up In Cardiology    HLD (hyperlipidemia) E78.5    Relevant Orders    Comprehensive  metabolic panel    Lipid panel    Follow Up In Cardiology

## 2025-03-10 NOTE — PATIENT INSTRUCTIONS
Please bring all medicines, vitamins, and herbal supplements with you in original bottles to every appointment!!!!    Prescriptions will not be filled unless you are compliant with your follow up appointments or have a follow up appointment scheduled as per instruction of your physician. Refills should be requested at the time of your visit.     FASTING LABS, FASTING FROM MIDNIGHT THE NIGHT BEFORE TO BE DONE 1 WEEK PRIOR TO YOUR APPOINTMENT WITH DR [email or web address]

## 2025-03-17 ENCOUNTER — APPOINTMENT (OUTPATIENT)
Dept: PRIMARY CARE | Facility: CLINIC | Age: 80
End: 2025-03-17
Payer: MEDICARE

## 2025-03-17 VITALS
HEIGHT: 62 IN | WEIGHT: 111.5 LBS | HEART RATE: 62 BPM | BODY MASS INDEX: 20.52 KG/M2 | SYSTOLIC BLOOD PRESSURE: 119 MMHG | DIASTOLIC BLOOD PRESSURE: 68 MMHG | OXYGEN SATURATION: 98 % | TEMPERATURE: 98.4 F

## 2025-03-17 DIAGNOSIS — Z12.31 ENCOUNTER FOR SCREENING MAMMOGRAM FOR BREAST CANCER: ICD-10-CM

## 2025-03-17 DIAGNOSIS — M16.12 PRIMARY OSTEOARTHRITIS OF LEFT HIP: ICD-10-CM

## 2025-03-17 DIAGNOSIS — K22.70 BARRETT'S ESOPHAGUS WITHOUT DYSPLASIA: ICD-10-CM

## 2025-03-17 DIAGNOSIS — M85.80 OSTEOPENIA, UNSPECIFIED LOCATION: ICD-10-CM

## 2025-03-17 DIAGNOSIS — Q03.0 AQUEDUCTAL STENOSIS (MULTI): ICD-10-CM

## 2025-03-17 DIAGNOSIS — E78.2 MIXED HYPERLIPIDEMIA: Primary | ICD-10-CM

## 2025-03-17 DIAGNOSIS — I47.10 PAROXYSMAL SVT (SUPRAVENTRICULAR TACHYCARDIA) (CMS-HCC): ICD-10-CM

## 2025-03-17 DIAGNOSIS — E03.9 HYPOTHYROIDISM, UNSPECIFIED TYPE: ICD-10-CM

## 2025-03-17 DIAGNOSIS — K21.00 GASTROESOPHAGEAL REFLUX DISEASE WITH ESOPHAGITIS WITHOUT HEMORRHAGE: ICD-10-CM

## 2025-03-17 DIAGNOSIS — I25.10 CORONARY ARTERY DISEASE INVOLVING NATIVE CORONARY ARTERY OF NATIVE HEART, UNSPECIFIED WHETHER ANGINA PRESENT: ICD-10-CM

## 2025-03-17 DIAGNOSIS — E55.9 VITAMIN D DEFICIENCY: ICD-10-CM

## 2025-03-17 PROBLEM — G91.1 OBSTRUCTIVE HYDROCEPHALUS (MULTI): Status: RESOLVED | Noted: 2024-03-28 | Resolved: 2025-03-17

## 2025-03-17 PROCEDURE — 1159F MED LIST DOCD IN RCRD: CPT | Performed by: FAMILY MEDICINE

## 2025-03-17 PROCEDURE — 1160F RVW MEDS BY RX/DR IN RCRD: CPT | Performed by: FAMILY MEDICINE

## 2025-03-17 PROCEDURE — G2211 COMPLEX E/M VISIT ADD ON: HCPCS | Performed by: FAMILY MEDICINE

## 2025-03-17 PROCEDURE — 1123F ACP DISCUSS/DSCN MKR DOCD: CPT | Performed by: FAMILY MEDICINE

## 2025-03-17 PROCEDURE — 99214 OFFICE O/P EST MOD 30 MIN: CPT | Performed by: FAMILY MEDICINE

## 2025-03-17 NOTE — PROGRESS NOTES
Subjective   Patient ID: Hedy Pete is a 79 y.o. female who presents for follow up.    HPI       No recent labs- released but not done @ lab.   Mammo: 08/26/2024  DEXA: 06/2023  Colon: 02/2018       Patient has hyperlipidemia.  Patient tries to limit the amount of fatty foods and high cholesterol foods that are consumed.  She is compliant with her atorvastatin. She denies any noted side effects.     Pt has known CAD.  Pt follows with her cardiologist (Dr. Pollard) on a regular basis.   She denies any chest pain or shortness of breath with or without exertional activity.      Patient has hypothyroidism.   She has been compliant with the dosing of her levothyroxine.     She has vitamin D deficiency.  Pt has been compliant with the dosing of her over-the-counter vitamin D supplementation.      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 Stallworth.  She is not on any acid suppressing medications at this time.     Pt has OA of the left hip.  She continues to have some hip pain but she has continued to delay hip replacement which was recommended by orthopedics.   She mostly notes symptoms of LEFT hip pain and restricted motion when she flexes the knee and externally rotates the hip such as putting on socks or shoes.     She has osteopenia.  A FRAX score reveals the need for treatment and was treated with Prolia in the past but it became too expensive.  Would like to try something less expressive.    Patient had adequate ductal stenosis and underwent an endoscopic third ventriculostomy 4/2024.  Headaches continue to be markedly improved    Review of Systems  Constitutional: Patient denies any fever, chills, loss of appetite, or unexplained weight loss.  Cardiovascular: Patient denies any chest pain, shortness of breath with exertion, tachycardia, palpitations, orthopnea, or paroxysmal nocturnal dyspnea.  Respiratory: Patient denies any cough, shortness  "breath, or wheezing.  Gastrointestinal: Patient denies any nausea, vomiting, diarrhea, constipation, melena, hematochezia, or reflux symptoms.  Skin: Denies any rashes or skin lesions.   Neurology: Patient denies any new motor or sensory losses.  Denies any numbness, tingling, weakness, and incoordination of the extremities.  Patient also denies any tremor, seizures, or gait instability.  Endocrinology: Denies any polyuria, polydipsia, polyphagia, or heat/cold intolerance.    Objective   /68   Pulse 62   Temp 36.9 °C (98.4 °F) (Temporal)   Ht 1.575 m (5' 2\")   Wt 50.6 kg (111 lb 8 oz)   SpO2 98%   BMI 20.39 kg/m²     Physical Exam    General Appearance: Alert and cooperative, in no acute distress, well-developed/well-nourished thin female.  Neck: Supple and without adenopathy or rigidity.  There is no JVD at 90° and no carotid bruits are noted.  There is no thyromegaly, thyroid tenderness, or palpable thyroid nodules.  Heart: Regular rate and rhythm without murmur or ectopy.  Respiratory: Lungs are clear to auscultation bilaterally with good air exchange.  Good respiratory effort and no accessory muscle use.  Skin: Good turgor, moist, warm and without rashes or lesions.  Neurological exam: Alert and oriented ×3, no tremor, normal gait.  Extremities: No clubbing, cyanosis.    Trace bilateral pretibial edema noted with Left > Right    Assessment/Plan       Hyperlipidemia:   Stable based on labs.  Patient will continue with the current medication.  Dietary changes, exercise, and maintenance of healthy weight were discussed at length.  Lab Results   Component Value Date    CHOL 149 08/15/2024    CHOL 147 08/21/2023    CHOL 117 07/27/2023     Lab Results   Component Value Date    HDL 66.3 08/15/2024    HDL 71.0 08/21/2023    HDL 62.9 07/27/2023     Lab Results   Component Value Date    LDLCALC 69 08/15/2024     Lab Results   Component Value Date    TRIG 71 08/15/2024    TRIG 63 08/21/2023    TRIG 56 " 07/27/2023       Coronary artery disease:   Patient is without worrisome signs or symptoms for unstable angina.  Risk factor modification was discussed at length.  Dietary changes, exercise and maintenance of a healthy weight were discussed.  Last stress test 3/2/2023 (normal).    Hypothyroidism:  Lab Results   Component Value Date    TSH 3.28 08/15/2024   Stable based on labs.  We will continue the current dose of levothyroxine  Will recheck TSH on her next labs.     Paroxysmal SVT:   Stable based on symptoms.  She is currently on metoprolol and has been managed by cardiology.  Pt has not experienced any palpitations or tachycardia.     GERD/ Villalpando's esophagus / Hiatal hernia:   She underwent a revision of Nissen wrap previously.  Denies reflux symptoms.      OA of left hip: She has persistent but stable pain.   Orthopedics has recommended she undergo a total replacement but she prefers to delay surgery as pain does not keep her from performing her daily activities.     Osteopenia:   She does meet criteria for treatment based on her FRAX score.   PROLIA approved 2024 8/21/2024: Pt declined PROLIA injection due to the cost.  We discussed alternative treatments.  I do not think oral bisphosphonates would be a good option for her given her past GI surgery (Nissen wrap) as she would be at risk of esophageal/GI irritation.  We decided to proceed with a trial of IV Reclast once yearly.  11/21/2024: Reclast was also going to be too expensive for her at $300.   Reclast may be more affordable later in the year if she meets her deductible/out of pocket.    Aqueductal stenosis:  Symptoms resolved with endoscopic 3rd ventriculostomy 4/2024.  Headaches remain markedly improved.  She will continue to follow with her specialist.     Vitamin D deficiency:    Vitamin D still low but she does not consistently take her supplementation.  Patient was encouraged to take her vitamin D supplementation as directed.  Lab Results    Component Value Date    VITD25 24 (L) 08/15/2024    VITD25 19 (A) 07/27/2023    VITD25 25 (A) 11/30/2022             PT TO FOLLOW UP IN SEPTEMBER AS SHE WILL BE IN [place] FOR 3 MONTHS AND WILL RETURN IN SEPTEMBER.      MAMMOGRAM DUE 8/27/2025  DEXA DUE 8/27/2025  MCAW DUE 5/2025        Orders Placed This Encounter   Procedures    BI mammo bilateral screening tomosynthesis       CONTINUE THE CURRENT MEDICATIONS WITHOUT CHANGE:  Current Outpatient Medications   Medication Instructions    atorvastatin (LIPITOR) 40 mg, oral, Nightly    benzonatate (TESSALON) 200 mg, oral, 3 times daily PRN, Do not crush or chew.    levothyroxine (SYNTHROID, LEVOXYL) 25 mcg, oral, Daily    nitroglycerin (NITROSTAT) 0.4 mg, Every 5 min PRN

## 2025-03-17 NOTE — PATIENT INSTRUCTIONS
HAVE FASTING LABS DONE SOON.  THE ORDERS ARE IN THE COMPUTER ALREADY.    Follow up in SEPTEMBER WHEN YOU RETURN FROM [place].  MAMMOGRAM DUE 8/27/2025 OR AFTER.    WE WILL WORK GETTING THE PROLIA APPROVED AND CAN START IN SEPTEMBER.    It was a pleasure to see you today. Thank you for choosing us for your health care needs.    If you have lab or other testing completed and have not been informed of results within one week, please call the office for your results.    If you haven't done so, consider signing up for [facility], the [facility] personal health record. Ask the staff how you can get started.

## 2025-03-24 ENCOUNTER — OFFICE VISIT (OUTPATIENT)
Dept: CARDIOLOGY | Facility: CLINIC | Age: 80
End: 2025-03-24
Payer: MEDICARE

## 2025-03-24 VITALS
OXYGEN SATURATION: 97 % | WEIGHT: 118 LBS | DIASTOLIC BLOOD PRESSURE: 82 MMHG | HEIGHT: 62 IN | SYSTOLIC BLOOD PRESSURE: 120 MMHG | BODY MASS INDEX: 21.71 KG/M2 | HEART RATE: 68 BPM

## 2025-03-24 DIAGNOSIS — I83.90 VARICOSE VEINS OF ANKLE: ICD-10-CM

## 2025-03-24 DIAGNOSIS — I87.2 CHRONIC VENOUS INSUFFICIENCY: Primary | ICD-10-CM

## 2025-03-24 DIAGNOSIS — I83.819 VARICOSE VEINS WITH PAIN: ICD-10-CM

## 2025-03-24 DIAGNOSIS — M79.89 LEG SWELLING: ICD-10-CM

## 2025-03-24 PROCEDURE — 99214 OFFICE O/P EST MOD 30 MIN: CPT | Performed by: INTERNAL MEDICINE

## 2025-03-24 PROCEDURE — 99204 OFFICE O/P NEW MOD 45 MIN: CPT | Performed by: INTERNAL MEDICINE

## 2025-03-24 PROCEDURE — 3079F DIAST BP 80-89 MM HG: CPT | Performed by: INTERNAL MEDICINE

## 2025-03-24 PROCEDURE — 1159F MED LIST DOCD IN RCRD: CPT | Performed by: INTERNAL MEDICINE

## 2025-03-24 PROCEDURE — 3074F SYST BP LT 130 MM HG: CPT | Performed by: INTERNAL MEDICINE

## 2025-03-24 PROCEDURE — 1126F AMNT PAIN NOTED NONE PRSNT: CPT | Performed by: INTERNAL MEDICINE

## 2025-03-24 PROCEDURE — 1123F ACP DISCUSS/DSCN MKR DOCD: CPT | Performed by: INTERNAL MEDICINE

## 2025-03-24 ASSESSMENT — PAIN SCALES - GENERAL: PAINLEVEL_OUTOF10: 0-NO PAIN

## 2025-03-24 NOTE — PATIENT INSTRUCTIONS
-- Use 20-30 mmHg compression stocking during the day, do not use the same pair for more than 4-6 months   -- Meticulous skin care:              Wash daily with mild soap and warm water              Daily use of emollient moisturizer (ex. Slime, Aquaphor, Eucerin)  -- Legs should be elevated whenever you are sleeping/lying in bed or sitting with 1-2 pillows below the legs in order to try to keep them above the level of heart  -- Exercise encouraged (at least 30 mins, 5 times a week, cyclic dependent thrusting of legs recommended to activate calf muscle pump)  -- Please try to follow a low salt diet  -- Close monitoring for signs of infection/wounds     -- Follow up in 3-4 months, earlier if needed

## 2025-03-24 NOTE — PROGRESS NOTES
Chief complaint: CVI    Subjective   Pt is accompanied by her friend, here to establish care   Almost a year ago, she did notice RLE distal/gaiter area firm, tender, and painful varicose veins surrounded by skin inflammation extending all the way to her right foot medially, there were no ultrasounds done, and it slowly improved with residual firm slightly tender varicosities, no skin inflammation but discoloration noted at this area  She also does have bilateral LE swelling, left more than right, worse at the end of the day associated with discomfort  She did not notice any varicosities to last year    She is very active at baseline, except for last year when she had SAH and normal pressure hydrocephalus requiring intervention   She does not use compression stockings  She does not sit much, she does not elevate her legs at night when she sleeps  Body mass index is 21.58 kg/m².  No family history of CVI  No known personal or family history of VTE    Review of Systems  As noted above   Arthritis   Headaches   Imbalance  Back pain    GERD  Dysphagia   Dysphonia  No other complaints today     Past Medical History:   Diagnosis Date    Current mild episode of major depressive disorder without prior episode (CMS-HCC) 11/02/2023    Intracranial hemorrhage (Multi) 02/04/2023    Intraventricular hemorrhage (Multi) 04/23/2022    Pericarditis ([facility]) 02/04/2023    Personal history of other diseases of the circulatory system     History of coronary artery disease    Personal history of other diseases of the circulatory system     History of hypertension    Personal history of other diseases of the digestive system     History of gastroesophageal reflux (GERD)    Personal history of other endocrine, nutritional and metabolic disease     History of high cholesterol    Personal history of other endocrine, nutritional and metabolic disease     History of hypothyroidism    S/p bilateral blepharoplasty 02/04/2023     Past Surgical  "History:   Procedure Laterality Date    CATARACT EXTRACTION  08/13/2019    Cataract Surgery    CHOLECYSTECTOMY  08/08/2017    Cholecystectomy    COLONOSCOPY W/ POLYPECTOMY  02/01/2018    Complete Colonoscopy For Polyp Removal    CT ANGIO NECK  4/23/2022    CT NECK ANGIO W AND WO IV CONTRAST 4/23/2022    CT HEAD ANGIO W AND WO IV CONTRAST  4/23/2022    CT HEAD ANGIO W AND WO IV CONTRAST 4/23/2022    CT HEAD ANGIO W AND WO IV CONTRAST  4/23/2022    CT HEAD ANGIO W AND WO IV CONTRAST 4/23/2022    ESOPHAGOGASTRODUODENOSCOPY  01/04/2018    Diagnostic Esophagogastroduodenoscopy    MR HEAD ANGIO WO IV CONTRAST  12/28/2023    MR HEAD ANGIO WO IV CONTRAST 12/28/2023 [facility]    OTHER SURGICAL HISTORY  08/13/2019    Ptosis repair    OTHER SURGICAL HISTORY  02/18/2022    Cardiac catheterization    OTHER SURGICAL HISTORY  06/15/2020    Esophagogastroduodenoscopy    OTHER SURGICAL HISTORY  02/22/2021    Paraesophageal hiatal hernia repair     Social History     Socioeconomic History    Marital status:    Tobacco Use    Smoking status: Never     Passive exposure: Never    Smokeless tobacco: Never   Vaping Use    Vaping status: Never Used   Substance and Sexual Activity    Alcohol use: Not Currently     Comment: occ    Drug use: Never      Family History   Problem Relation Name Age of Onset    Other (Arteriosclerotic Cardiovascular disease) Mother      Other (Cardiac Arrhythmia) Mother      Alzheimer's disease Father      Pancreatic cancer Father        Allergies   Allergen Reactions    Hydromorphone Shortness of breath and Other     Vomitting    Aspirin Unknown    Diltiazem Hcl Swelling     edema    Duloxetine Headache and Nausea Only    Penicillins Unknown       Objective   Physical Exam  /82 (BP Location: Left arm, Patient Position: Sitting)   Ht 1.575 m (5' 2\")   Wt 53.5 kg (118 lb)   BMI 21.58 kg/m²      General:  In no acute distress  Neuro: alert and oriented x3  CV:  RRR  Lungs: CTA bilaterally  Abd:  Soft, " non-tender   Psych:  Appropriate affect  Upper extremities: No swelling, +2 radial   Lower extremities: Bilateral LE edema, left more than right  Skin: Spider/reticular varicose veins.  RLE distal/gaiter area firm, tender varicosities with skin discoloration    Medications   Current Outpatient Medications   Medication Instructions    atorvastatin (LIPITOR) 40 mg, oral, Nightly    benzonatate (TESSALON) 200 mg, oral, 3 times daily PRN, Do not crush or chew.    levothyroxine (SYNTHROID, LEVOXYL) 25 mcg, oral, Daily    nitroglycerin (NITROSTAT) 0.4 mg, Every 5 min PRN       Lab Review   Recent Labs     08/15/24  0943 12/07/23  0822 08/21/23  0854 07/27/23  0905 02/13/23  0953 01/23/23  1233 11/30/22  0944 05/03/22  0856    138 137 135* 140 141 138 137   K 4.3 3.9 4.0 4.1 4.2 3.8 4.2 4.1    101 102 100 103 105 103 102   CO2 31 28 28 29 30 29 30 28   ANIONGAP 9* 13 11 10 11 11 9* 11   BUN 12 15 14 12 13 15 8 19   CREATININE 0.78 0.76 0.66 0.67 0.64 0.57 0.54 0.78   EGFR 79 81  --   --   --   --   --   --      Recent Labs     08/15/24  0943 12/07/23  0822 08/21/23  0854 07/27/23  0905 02/13/23  0953   ALBUMIN 3.9 4.5 4.1 4.0 3.9   ALKPHOS 62 57 50 47 42   ALT 25 18 20 18 21   AST 27 22 22 22 23   BILITOT 0.8 0.9 0.8 1.1 0.9     Recent Labs     12/07/23  0822 08/21/23  0854 02/13/23  0953 05/03/22  0856 02/06/20  1124 01/30/18  0000   WBC 7.7 6.0 5.9 6.0 6.5 5.6   HGB 14.0 13.0 12.7 12.8 13.3 13.2   HCT 41.9 39.7 39.0 39.5 41.0 41.3    220 207 267 292 264   MCV 90 90 90 91 91 91     Recent Labs     02/06/20  1124   FIBRINOGEN 337     PTT - No results in last year.    Recent Labs     08/15/24  0943 08/21/23  0854 07/27/23  0905 02/13/23  0953   CHOL 149 147 117 135   LDLF  --  63 43 56   HDL 66.3 71.0 62.9 68.5   TRIG 71 63 56 54     Lab Results   Component Value Date    HGBA1C 5.8 04/24/2022     Lab Results   Component Value Date    TSH 3.28 08/15/2024       Imaging  Not relevant    Imaging reports and  labs listed above reviewed     Assessment/Plan   Hedy Pete is a 79 y.o. female with PMHx of HTN, HLD, CAD, SVT, hypothyroidism, GERD, Villalpando's, HH, IVH, normal pressure hydrocephalus    _Leg swelling, left> right, unclear chronicity  Likely secondary to left hip pain and favoring the right leg with subsequent decrease in left calf muscle pump  _CVI as suggested by exam, suspect she had R gaiter area SVT last year based on her presentation    Plan  -- Use 20-30 mmHg thigh high compression stockings during the day   -- Leg elevation, exercise and conservative measures discussed   -- Monitor for possible CVI complications as VV bleeding, dermatitis, ulcers and thrombophlebitis    -- Venous insufficiency study  -- The rest as detailed in the patient's instructions    Jaye James MD

## 2025-04-04 ENCOUNTER — HOSPITAL ENCOUNTER (OUTPATIENT)
Dept: VASCULAR MEDICINE | Facility: CLINIC | Age: 80
Discharge: HOME | End: 2025-04-04
Payer: MEDICARE

## 2025-04-04 DIAGNOSIS — M79.89 LEG SWELLING: ICD-10-CM

## 2025-04-04 DIAGNOSIS — I87.2 CHRONIC VENOUS INSUFFICIENCY: ICD-10-CM

## 2025-04-04 DIAGNOSIS — I83.819 VARICOSE VEINS WITH PAIN: ICD-10-CM

## 2025-04-04 DIAGNOSIS — I83.93 ASYMPTOMATIC VARICOSE VEINS OF BILATERAL LOWER EXTREMITIES: ICD-10-CM

## 2025-04-04 DIAGNOSIS — I83.90 VARICOSE VEINS OF ANKLE: ICD-10-CM

## 2025-04-04 PROCEDURE — 93970 EXTREMITY STUDY: CPT | Performed by: INTERNAL MEDICINE

## 2025-04-04 PROCEDURE — 93970 EXTREMITY STUDY: CPT

## 2025-04-07 ENCOUNTER — TELEPHONE (OUTPATIENT)
Dept: CARDIOLOGY | Facility: CLINIC | Age: 80
End: 2025-04-07
Payer: MEDICARE

## 2025-04-07 PROBLEM — I10 HTN (HYPERTENSION): Status: RESOLVED | Noted: 2023-02-04 | Resolved: 2025-04-07

## 2025-04-07 NOTE — TELEPHONE ENCOUNTER
----- Message from Jaye James sent at 4/7/2025  8:26 AM EDT -----  VI study with evidence of venous insufficiency (reflux), would encourage using the compression stockings during the day, leg elevation when sitting/sleeping, and exercise as tolerated  Follow up as scheduled to decide on the next steps based on the response to above   Thanks

## 2025-04-07 NOTE — TELEPHONE ENCOUNTER
Result Communication    Resulted Orders   Vascular US Lower Extremity Venous Insufficiency Bilateral    Narrative              [facility]  [address], [place] [address]  Tel [phone] and Fax [phone]       Vascular Lab Report  [facility] US LOWER EXTREMITY VENOUS INSUFFICIENCY BILATERAL       Patient Name:      OUSMANE VALADEZ  Piero Physician: [id number] Keerthi Zabala MD  Study Date:        4/4/2025            Ordering           [id number] YULISSA DAY                                         Physician:         DU  MRN/PID:           [id number]            Technologist:      Arley Tolentino [facility]  Accession#:        [id number]        Technologist 2:  Date of Birth/Age: 1945 / 79      Encounter#:        [id number]                     years  Gender:            F  Admission Status:  Outpatient          Location           [facility]                                         Performed:       Diagnosis/ICD:    Asymptomatic varicose veins of bilateral lower                    extremities-I83.93; Other specified soft tissue                    disorders-M79.89  Indication:       Varicose veins near ankle/ swelling  CPT Codes:        86177 Venous reflux study VV VI complete  Patient Position: Study performed in a reverse Trendelenburg position.       Patient History HTN, CAD, Hyperlipidemia and LE Edema.       CONCLUSIONS:  Right Lower Venous Insufficiency: Reflux is noted in the proximal thigh great saphenous, mid thigh great saphenous, knee level of great saphenous and proximal calf great saphenous veins.  reflux is documented in the right proximal calf. There is reflux noted in the common femoral and proximal femoral veins. Right proximal calf  from great saphenous vein to posterior tibial vein with reflux time of 3.69 seconds and diameter of 1.57 mm and depth of 8.9 mm. The GSV in the thigh courses superficially at distal thigh and becomes varicose.  Left Lower Venous  Insufficiency: Reflux is noted in the mid small saphenous vein. There is reflux noted in the common femoral and proximal femoral veins. Great saphenous vein appears fibrotic in calf.  Right Lower Venous: No evidence of acute deep vein thrombus visualized in the right lower extremity.  Left Lower Venous: No evidence of acute deep vein thrombus visualized in the left lower extremity.     Imaging & Doppler Findings:     Right          Compress Thrombus  Diam   Depth    Time  SFJ              Yes      None  Prox Thigh GSV   Yes      None   4.1 mm 11.3 mm 4.97 sec  Mid Thigh GSV    Yes      None   2.9 mm 9.6 mm  4.97 sec  Knee GSV         Yes      None   6.5 mm 2.4 mm  4.99 sec  Prox Calf GSV    Yes      None   2.0 mm 7.2 mm  0.84 sec  Mid Calf GSV     Yes      None  Dist Calf GSV    Yes      None  SPJ              Yes      None  SSV Prox         Yes      None  SSV Mid          Yes      None  SSV Distal       Yes      None  Common FV                                       1.00 sec       Left           Compress Thrombus  Diam  Depth    Time  SFJ              Yes      None  Prox Thigh GSV   Yes      None  Mid Thigh GSV    Yes      None  Knee GSV         Yes      None  Prox Calf GSV    Yes    Fibrotic  Mid Calf GSV     Yes    Fibrotic  Dist Calf GSV    Yes    Fibrotic  SPJ              Yes      None  SSV Prox         Yes      None  SSV Mid          Yes      None   2.8 mm 7.6 mm 1.10 sec  SSV Distal       Yes      None  Common FV                                      2.79 sec       Right       Compressible Thrombus        Flow  CFV             Yes        None   Spontaneous/Phasic  PFV             Yes        None  FV Proximal     Yes        None   Spontaneous/Phasic  FV Mid          Yes        None  FV Distal       Yes        None  Popliteal       Yes        None   Spontaneous/Phasic  Peroneal        Yes        None  PTV             Yes        None       Left                  Compress Thrombus        Flow  Distal External Iliac             None       Pulsatile  CFV                     Yes      None       Pulsatile  PFV                     Yes      None  FV Proximal             Yes      None       Pulsatile  FV Mid                  Yes      None  FV Distal               Yes      None  Popliteal               Yes      None   Spontaneous/Phasic  Peroneal                Yes      None  PTV                     Yes      None       [id number] Keerthi Zabala MD  Electronically signed by [id number] Keerthi Zabala MD on 4/6/2025 at 10:07:45 AM         ** Final **         10:41 AM      Results were successfully communicated with the patient and they acknowledged their understanding.

## 2025-08-13 DIAGNOSIS — M85.80 OSTEOPENIA, UNSPECIFIED LOCATION: Primary | ICD-10-CM

## 2025-09-02 ENCOUNTER — APPOINTMENT (OUTPATIENT)
Dept: PRIMARY CARE | Facility: CLINIC | Age: 80
End: 2025-09-02
Payer: MEDICARE

## 2025-09-08 ENCOUNTER — APPOINTMENT (OUTPATIENT)
Dept: CARDIOLOGY | Facility: CLINIC | Age: 80
End: 2025-09-08
Payer: MEDICARE

## 2025-09-15 ENCOUNTER — APPOINTMENT (OUTPATIENT)
Dept: PRIMARY CARE | Facility: CLINIC | Age: 80
End: 2025-09-15
Payer: MEDICARE